# Patient Record
Sex: FEMALE | Race: BLACK OR AFRICAN AMERICAN | Employment: PART TIME | URBAN - METROPOLITAN AREA
[De-identification: names, ages, dates, MRNs, and addresses within clinical notes are randomized per-mention and may not be internally consistent; named-entity substitution may affect disease eponyms.]

---

## 2017-01-09 ENCOUNTER — OFFICE VISIT (OUTPATIENT)
Dept: FAMILY MEDICINE CLINIC | Age: 62
End: 2017-01-09

## 2017-01-09 VITALS
OXYGEN SATURATION: 95 % | WEIGHT: 234 LBS | DIASTOLIC BLOOD PRESSURE: 83 MMHG | HEIGHT: 67 IN | BODY MASS INDEX: 36.73 KG/M2 | SYSTOLIC BLOOD PRESSURE: 130 MMHG | HEART RATE: 93 BPM | RESPIRATION RATE: 20 BRPM | TEMPERATURE: 97.8 F

## 2017-01-09 DIAGNOSIS — J30.2 SEASONAL ALLERGIC RHINITIS, UNSPECIFIED ALLERGIC RHINITIS TRIGGER: Primary | ICD-10-CM

## 2017-01-09 DIAGNOSIS — H65.93 BILATERAL SEROUS OTITIS MEDIA, UNSPECIFIED CHRONICITY: ICD-10-CM

## 2017-01-09 RX ORDER — MOMETASONE FUROATE 50 UG/1
2 SPRAY, METERED NASAL DAILY
Qty: 2 CONTAINER | Refills: 0 | Status: SHIPPED | COMMUNITY
Start: 2017-01-09 | End: 2017-01-24 | Stop reason: SDUPTHER

## 2017-01-09 RX ORDER — MOMETASONE FUROATE 50 UG/1
2 SPRAY, METERED NASAL DAILY
Qty: 2 CONTAINER | Refills: 0 | Status: SHIPPED | COMMUNITY
Start: 2017-01-09 | End: 2021-03-22

## 2017-01-09 RX ORDER — CETIRIZINE HCL 10 MG
10 TABLET ORAL DAILY
Qty: 30 TAB | Refills: 0 | Status: SHIPPED | COMMUNITY
Start: 2017-01-09 | End: 2017-02-08

## 2017-01-09 NOTE — MR AVS SNAPSHOT
Visit Information Date & Time Provider Department Dept. Phone Encounter #  
 1/9/2017 11:00 AM Samy Bailey NP [address] [facility] [id number] Follow-up Instructions Return if symptoms worsen or fail to improve. Your Appointments 1/24/2017 10:30 AM  
Follow Up with Samy Bailey NP [address] [facility] ([facility]) Appt Note: 3 mth f/u  
 [address] [facility] [id number]  
[address] [facility] [id number] Upcoming Health Maintenance Date Due Pneumococcal 19-64 Medium Risk (1 of 1 - PPSV23) 12/14/1976 DTaP/Tdap/Td series (1 - Tdap) 12/14/1978 FOBT Q 1 YEAR AGE 50-75 12/11/2015 BREAST CANCER SCRN MAMMOGRAM 1/14/2017 PAP AKA CERVICAL CYTOLOGY 1/6/2018 Allergies as of 1/9/2017  Review Complete On: 1/9/2017 By: Jennifer Camp Severity Noted Reaction Type Reactions Compazine [Prochlorperazine]  06/07/2013    Hives Current Immunizations  Reviewed on 11/18/2015 Name Date Influenza Vaccine (Quad) PF 10/17/2016, 11/18/2015 Influenza Vaccine PF 10/31/2013 Not reviewed this visit You Were Diagnosed With   
  
 Codes Comments Seasonal allergic rhinitis, unspecified allergic rhinitis trigger    -  Primary ICD-10-CM: J30.2 ICD-9-CM: 477.9 Bilateral serous otitis media, unspecified chronicity     ICD-10-CM: H65.93 
ICD-9-CM: 381. 4 Vitals BP Pulse Temp Resp Height(growth percentile) Weight(growth percentile) 130/83 93 97.8 °F (36.6 °C) 20 5' 7\" (1.702 m) 234 lb (106.1 kg) SpO2 BMI OB Status Smoking Status 95% 36.65 kg/m2 Postmenopausal Current Every Day Smoker Vitals History BMI and BSA Data Body Mass Index Body Surface Area  
 36.65 kg/m 2 2.24 m 2 Preferred Pharmacy Pharmacy Name Phone Of[facility] Osorio [address] [facility] [phone] Your Updated Medication List  
  
   
This list is accurate as of: 1/9/17 11:32 AM.  Always use your most recent med list.  
  
  
  
  
 albuterol 90 mcg/actuation inhaler Commonly known as:  PROAIR HFA Take 1-2 Puffs by inhalation every four (4) hours as needed for Wheezing. cetirizine 10 mg tablet Commonly known as:  ZyrTEC Take 1 Tab by mouth daily for 30 days. CLEOCIN T 1 % topical gel Generic drug:  clindamycin Apply  to affected area two (2) times a day. use thin film on affected area  
  
 clobetasol 0.05 % ointment Commonly known as:  Meghan [place] Apply  to affected area two (2) times a day. dexlansoprazole 30 mg capsule Commonly known as:  DEXILANT Take 1 Cap by mouth daily as needed. Indications: GASTROESOPHAGEAL REFLUX  
  
 fluticasone-salmeterol 100-50 mcg/dose diskus inhaler Commonly known as:  ADVAIR Take 1 Puff by inhalation every twelve (12) hours. ledipasvir-sofosbuvir  mg Tab Commonly known as:  Jessica Nim Take 1 Tab by mouth daily. Indications: CHRONIC HEPATITIS C - GENOTYPE 1  
  
 magnesium oxide 400 mg tablet Commonly known as:  MAG-OX Take 1 Tab by mouth daily. * mometasone 50 mcg/actuation nasal spray Commonly known as:  NASONEX  
2 Sprays by Both Nostrils route daily. * mometasone 50 mcg/actuation nasal spray Commonly known as:  NASONEX  
2 Sprays by Both Nostrils route daily. olmesartan-hydroCHLOROthiazide 40-25 mg per tablet Commonly known as:  BENICAR HCT Take 1 Tab by mouth daily. Indications: HYPERTENSION  
  
 travoprost 0.004 % ophthalmic solution Commonly known as:  TRAVATAN Z Administer 1 Drop to both eyes every evening. Indications: OPEN ANGLE GLAUCOMA  
  
 verapamil 120 mg tablet Commonly known as:  CALAN Take 1 Tab by mouth two (2) times a day. Indications: Hypertension * Notice: This list has 2 medication(s) that are the same as other medications prescribed for you. Read the directions carefully, and ask your doctor or other care provider to review them with you. Prescriptions Printed Refills  
 mometasone (NASONEX) 50 mcg/actuation nasal spray 0 Si Sprays by Both Nostrils route daily. Class: Program  
 Route: Both Nostrils Prescriptions Sent to Mail Order Refills  
 mometasone (NASONEX) 50 mcg/actuation nasal spray 0 Si Sprays by Both Nostrils route daily. Class: Program  
 Pharmacy: [address] #: [phone] Route: Both Nostrils Prescriptions Sent to Pharmacy Refills  
 mometasone (NASONEX) 50 mcg/actuation nasal spray 0 Si Sprays by Both Nostrils route daily. Class: Program  
 Pharmacy: [address] #: [phone] Route: Both Nostrils Follow-up Instructions Return if symptoms worsen or fail to improve. Patient Instructions Rhinitis: Care Instructions Your Care Instructions Rhinitis is swelling and irritation in the nose. Allergies and infections are often the cause. Your nose may run or feel stuffy. Other symptoms are itchy and sore eyes, ears, throat, and mouth. If allergies are the cause, your doctor may do tests to find out what you are allergic to. You may be able to stop symptoms if you avoid the things that cause them. Your doctor may suggest or prescribe medicine to ease your symptoms. Follow-up care is a key part of your treatment and safety. Be sure to make and go to all appointments, and call your doctor if you are having problems. It's also a good idea to know your test results and keep a list of the medicines you take. How can you care for yourself at home?  
· If your rhinitis is caused by allergies, try to find out what sets off (triggers) your symptoms. Take steps to avoid these triggers. ¨ Avoid yard work. It can stir up both pollen and mold. ¨ Do not smoke or allow others to smoke around you. If you need help quitting, talk to your doctor about stop-smoking programs and medicines. These can increase your chances of quitting for good. ¨ Do not use aerosol sprays, cleaning products, or perfumes. ¨ If pollen is one of your triggers, close your house and car windows during blooming season. ¨ Clean your house often to control dust. 
¨ Keep pets outside. · If your doctor recommends over-the-counter medicines to relieve symptoms, take your medicines exactly as prescribed. Call your doctor if you think you are having a problem with your medicine. · Use saline (saltwater) nasal washes to help keep your nasal passages open and wash out mucus and bacteria. You can buy saline nose drops at a grocery store or drugstore. Or you can make your own at home by adding 1 teaspoon of salt and 1 teaspoon of baking soda to 2 cups of distilled water. If you make your own, fill a bulb syringe with the solution, insert the tip into your nostril, and squeeze gently. [place] L[place] your nose. When should you call for help? Call your doctor now or seek immediate medical care if: 
· You are having trouble breathing. Watch closely for changes in your health, and be sure to contact your doctor if: · Mucus from your nose gets thicker (like pus) or has new blood in it. · You have new or worse symptoms. · You do not get better as expected. Where can you learn more? Go to http://drew-tarun.info/. Enter M030 in the search box to learn more about \"Rhinitis: Care Instructions. \" Current as of: July 29, 2016 Content Version: 11.1 © 2455-8369 [employer]. Care instructions adapted under license by [employer] (which disclaims liability or warranty for this information).  If you have questions about a medical condition or this instruction, always ask your healthcare professional. [place] [address] any warranty or liability for your use of this information. Using a Nasal Steroid Spray: Care Instructions Your Care Instructions Your doctor may suggest using a corticosteroid nasal spray for your allergy symptoms or sinus problems. These sprays reduce the swelling inside the nose and sinuses. Unlike decongestant nasal sprays, steroid sprays won't lead to more swelling when you stop taking them. These sprays start working in a few days, but it may take several weeks before you get the full effect. Most side effects are minor. The most common complaint is a burning feeling in the nose right after the spray is used. Some people get nosebleeds. Follow-up care is a key part of your treatment and safety. Be sure to make and go to all appointments, and call your doctor if you are having problems. It's also a good idea to know your test results and keep a list of the medicines you take. How can you care for yourself at home? Here are some tips for using these sprays: 
· You may need to prime the sprayer before you use it. This means spraying it into the air a few times to make sure you get the right amount of medicine. Follow the directions on the label. · Blow your nose before you spray. This will help clear out your nostrils. · Gently sniff the medicine into your nose as you spray. Don't snort, or the medicine will go all the way into your throat where it won't do much good. · Aim the nozzle straight toward the back of your nose. This will help keep the medicine from irritating the inside walls of your nostril, especially your septum (the wall that separates your left and right nostrils). · Don't blow your nose for 10 minutes or so after you spray. And try not to sneeze. · Be safe with medicines. Use this medicine exactly as prescribed.  Call your doctor if you think you are having a problem with your medicine. · Clean your sprayer once a week. Read the label to learn how. When should you call for help? Call your doctor now or seek immediate medical care if: 
· You don't understand how to use the medicine. · Your symptoms aren't getting better as expected. · You think you are having a side effect from the medicine. Where can you learn more? Go to http://drew-tarun.info/. Enter W447 in the search box to learn more about \"Using a Nasal Steroid Spray: Care Instructions. \" Current as of: July 29, 2016 Content Version: 11.1 © 2843-7730 [employer]. Care instructions adapted under license by [employer] (which disclaims liability or warranty for this information). If you have questions about a medical condition or this instruction, always ask your healthcare professional. [place] [address] any warranty or liability for your use of this information. Introducing [facility]! Dear Toni Li: Thank you for requesting a [employer] account. Our records indicate that you already have an active [employer] account. You can access your account anytime at [email or web address] Did you know that you can access your hospital and ER discharge instructions at any time in [employer]? You can also review all of your test results from your hospital stay or ER visit. Additional Information If you have questions, please visit the Frequently Asked Questions section of the [employer] website at [email or web address] Remember, [employer] is NOT to be used for urgent needs. For medical emergencies, dial 911. Now available from your iPhone and Android! Please provide this summary of care documentation to your next provider. Your primary care clinician is listed as Jevon Colon.  If you have any questions after today's visit, please call [phone].

## 2017-01-09 NOTE — PROGRESS NOTES
SULEIMAN CHOU [facility]  [facility], [address] [place] [address]  [phone] office/[phone] fax      1/9/2017    Reason for visit:   Chief Complaint   Patient presents with    Ear Pain     b/l ear pain off and on about 2 months the rt ear started first and now both of them hurt       Patient: Keith Fontenot, 1955, [id number]       Primary MD: Dheeraj Marsh NP    Subjective:   Keith Fontenot, a 63 y.o. female, who presents for Ear Pain (b/l ear pain off and on about 2 months the rt ear started first and now both of them hurt)      Ear Pain   The history is provided by the patient. This is a new problem. The current episode started more than 1 week ago. The problem occurs constantly. The problem has been gradually worsening (Now involving both ears). Associated symptoms include headaches. Pertinent negatives include no chest pain, no abdominal pain and no shortness of breath. Treatments tried: Claritin, zyrtec. The treatment provided no relief.        Past Medical History   Diagnosis Date    Arthritis of knee, left 2007    Erythrasma May 2014     beba feet, lower left leg    Glaucoma     H/O mammogram 03/01/2016     normal, f/u in 1 year    Hepatitis C 2002     genotype 1a, previous IV drug user, declined interferons     Hypertension 2002    Tobacco use      working with [facility] Pulmonology smoking cessation        Past Surgical History   Procedure Laterality Date    Hx other surgical  2002     liver biopsy       Social History     Social History    Marital status:      Spouse name: N/A    Number of children: 2    Years of education: N/A     Occupational History   [employer]      Social History Main Topics    Smoking status: Current Every Day Smoker     Packs/day: 0.50     Types: Cigarettes     Start date: 1/12/1974    Smokeless tobacco: Not on file    Alcohol use No    Drug use: Yes      Comment: IV crack cocaine, in remission since 2002    Sexual activity: Yes     Partners: Male     Other Topics Concern     Service No    Blood Transfusions No    Caffeine Concern No    Occupational Exposure No    Hobby Hazards No    Sleep Concern No    Stress Concern No    Weight Concern Yes     want bypass    Special Diet No    Back Care No    Exercise No    Bike Helmet No    Seat Belt No    Self-Exams No     Social History Narrative       Allergies   Allergen Reactions    Compazine [Prochlorperazine] Hives       Current Outpatient Prescriptions on File Prior to Visit   Medication Sig Dispense Refill    verapamil (CALAN) 120 mg tablet Take 1 Tab by mouth two (2) times a day. Indications: Hypertension 60 Tab 5    olmesartan-hydrochlorothiazide (BENICAR HCT) 40-25 mg per tablet Take 1 Tab by mouth daily. Indications: HYPERTENSION 90 Tab 3    fluticasone-salmeterol (ADVAIR) 100-50 mcg/dose diskus inhaler Take 1 Puff by inhalation every twelve (12) hours. 3 Inhaler 3    albuterol (PROAIR HFA) 90 mcg/actuation inhaler Take 1-2 Puffs by inhalation every four (4) hours as needed for Wheezing. 3 Inhaler 3    dexlansoprazole (DEXILANT) 30 mg capsule Take 1 Cap by mouth daily as needed. Indications: GASTROESOPHAGEAL REFLUX 90 Cap 3    magnesium oxide (MAG-OX) 400 mg tablet Take 1 Tab by mouth daily. 30 Tab 11    travoprost (TRAVATAN Z) 0.004 % ophthalmic solution Administer 1 Drop to both eyes every evening. Indications: OPEN ANGLE GLAUCOMA 5 mL 3    clindamycin (CLEOCIN T) 1 % topical gel Apply  to affected area two (2) times a day. use thin film on affected area      clobetasol (TEMOVATE) 0.05 % ointment Apply  to affected area two (2) times a day.  ledipasvir-sofosbuvir (HARVONI)  mg tab Take 1 Tab by mouth daily. Indications: CHRONIC HEPATITIS C - GENOTYPE 1 90 Tab 0     No current facility-administered medications on file prior to visit. Review of Systems   Constitutional: Negative for fever.    HENT: Positive for congestion, ear pain and nosebleeds (From blowing nose so much ). Negative for ear discharge, hearing loss and tinnitus. Blowing nose a lot yellowish/clear   Respiratory: Negative for shortness of breath. Cardiovascular: Negative for chest pain. Gastrointestinal: Negative for abdominal pain. Neurological: Positive for headaches. Endo/Heme/Allergies: Positive for environmental allergies. Objective:   Visit Vitals    /83    Pulse 93    Temp 97.8 °F (36.6 °C)    Resp 20    Ht 5' 7\" (1.702 m)    Wt 234 lb (106.1 kg)    SpO2 95%    BMI 36.65 kg/m2      Wt Readings from Last 3 Encounters:   01/09/17 234 lb (106.1 kg)   10/19/16 234 lb 9.6 oz (106.4 kg)   11/18/15 235 lb 3.2 oz (106.7 kg)     Lab Results   Component Value Date/Time    Glucose 123 10/13/2016 10:10 AM         Physical Exam   Constitutional: She appears well-developed and well-nourished. HENT:   Head: Normocephalic. Right Ear: A middle ear effusion is present. Left Ear: A middle ear effusion is present. Nose: Mucosal edema and rhinorrhea present. Right sinus exhibits no maxillary sinus tenderness and no frontal sinus tenderness. Left sinus exhibits no maxillary sinus tenderness and no frontal sinus tenderness. Highly vascularized   Skin: She is not diaphoretic. Assessment:    Manuela Zhang who has risk factors including (see above previous medical hx) and:       ICD-10-CM ICD-9-CM    1. Seasonal allergic rhinitis, unspecified allergic rhinitis trigger J30.2 477.9 mometasone (NASONEX) 50 mcg/actuation nasal spray      mometasone (NASONEX) 50 mcg/actuation nasal spray      cetirizine (ZYRTEC) 10 mg tablet   2. Bilateral serous otitis media, unspecified chronicity H65.93 381.4 mometasone (NASONEX) 50 mcg/actuation nasal spray      mometasone (NASONEX) 50 mcg/actuation nasal spray      cetirizine (ZYRTEC) 10 mg tablet     1.  Seasonal allergic rhinitis, unspecified allergic rhinitis trigger  -Saline irrigation   - mometasone (NASONEX) 50 mcg/actuation nasal spray; 2 Sprays by Both Nostrils route daily. Dispense: 2 Container; Refill: 0  - mometasone (NASONEX) 50 mcg/actuation nasal spray; 2 Sprays by Both Nostrils route daily. Dispense: 2 Container; Refill: 0  - cetirizine (ZYRTEC) 10 mg tablet; Take 1 Tab by mouth daily for 30 days. Dispense: 30 Tab; Refill: 0    2. Bilateral serous otitis media, unspecified chronicity  - diff dx clogged eustacian tubes secondary to URI vs allergies  - mometasone (NASONEX) 50 mcg/actuation nasal spray; 2 Sprays by Both Nostrils route daily. Dispense: 2 Container; Refill: 0  - mometasone (NASONEX) 50 mcg/actuation nasal spray; 2 Sprays by Both Nostrils route daily. Dispense: 2 Container; Refill: 0  - cetirizine (ZYRTEC) 10 mg tablet; Take 1 Tab by mouth daily for 30 days. Dispense: 30 Tab; Refill: 0      Written instructions followed our verbal discussion of all information discussed above, pending tests ordered and future goals/plans. Patient expressed understanding of current diagnosis, planned testing, follow up and if needed to contact the office for any questions or concerns prior to the next visit. Plan:   Med reconciliation completed with patient. Reviewed side effects of medications with the patient. Questions were answered and patient verb understanding. Orders Placed This Encounter    mometasone (NASONEX) 50 mcg/actuation nasal spray     Si Sprays by Both Nostrils route daily. Dispense:  2 Container     Refill:  0    mometasone (NASONEX) 50 mcg/actuation nasal spray     Si Sprays by Both Nostrils route daily.      Dispense:  2 Container     Refill:  0     Order Specific Question:   Expiration Date     Answer:   2017     Order Specific Question:   Lot#     Answer:   [id number]     Order Specific Question:        Answer:   [facility]     Order Specific Question:   NDC#     Answer:   [address]-67    cetirizine (ZYRTEC) 10 mg tablet     Sig: Take 1 Tab by mouth daily for 30 days. Dispense:  30 Tab     Refill:  0     Order Specific Question:   Site     Answer:   OTHER     Order Specific Question:   Expiration Date     Answer:   1/9/2017     Order Specific Question:   Lot#     Answer:   UNAVAILABLE     Order Specific Question:        Answer:   UNAVAILABLE     Current Outpatient Prescriptions   Medication Sig Dispense Refill    mometasone (NASONEX) 50 mcg/actuation nasal spray 2 Sprays by Both Nostrils route daily. 2 Container 0    mometasone (NASONEX) 50 mcg/actuation nasal spray 2 Sprays by Both Nostrils route daily. 2 Container 0    cetirizine (ZYRTEC) 10 mg tablet Take 1 Tab by mouth daily for 30 days. 30 Tab 0    verapamil (CALAN) 120 mg tablet Take 1 Tab by mouth two (2) times a day. Indications: Hypertension 60 Tab 5    olmesartan-hydrochlorothiazide (BENICAR HCT) 40-25 mg per tablet Take 1 Tab by mouth daily. Indications: HYPERTENSION 90 Tab 3    fluticasone-salmeterol (ADVAIR) 100-50 mcg/dose diskus inhaler Take 1 Puff by inhalation every twelve (12) hours. 3 Inhaler 3    albuterol (PROAIR HFA) 90 mcg/actuation inhaler Take 1-2 Puffs by inhalation every four (4) hours as needed for Wheezing. 3 Inhaler 3    dexlansoprazole (DEXILANT) 30 mg capsule Take 1 Cap by mouth daily as needed. Indications: GASTROESOPHAGEAL REFLUX 90 Cap 3    magnesium oxide (MAG-OX) 400 mg tablet Take 1 Tab by mouth daily. 30 Tab 11    travoprost (TRAVATAN Z) 0.004 % ophthalmic solution Administer 1 Drop to both eyes every evening. Indications: OPEN ANGLE GLAUCOMA 5 mL 3    clindamycin (CLEOCIN T) 1 % topical gel Apply  to affected area two (2) times a day. use thin film on affected area      clobetasol (TEMOVATE) 0.05 % ointment Apply  to affected area two (2) times a day.  ledipasvir-sofosbuvir (HARVONI)  mg tab Take 1 Tab by mouth daily.  Indications: CHRONIC HEPATITIS C - GENOTYPE 1 90 Tab 0 Medications Discontinued During This Encounter   Medication Reason    cyproheptadine (PERIACTIN) 4 mg tablet Not A Current Medication       Follow-up Disposition:  Return if symptoms worsen or fail to improve. Labs needed for follow-up appt    \"No Show policy was reviewed with the patient. The services affected are the nurse navigator and the provider. No show appointments include missing labs for a future scheduled appointment, Pap/pelvics, arriving to appointment more than 10 minutes late, and calling to cancel appointment less than 24 hours in advance. After the 6th No Show, the patient will be removed from the [facility] to include medications for 6 months. The patient will be referred to the [place] [address] for their primary care needs. \"     Power Manrique, [address]      I spent 20 minutes with the patient in face-to-face consultation, of which greater than 50% was spent in counseling and coordination of care as described above.

## 2017-01-09 NOTE — PATIENT INSTRUCTIONS
Rhinitis: Care Instructions  Your Care Instructions  Rhinitis is swelling and irritation in the nose. Allergies and infections are often the cause. Your nose may run or feel stuffy. Other symptoms are itchy and sore eyes, ears, throat, and mouth. If allergies are the cause, your doctor may do tests to find out what you are allergic to. You may be able to stop symptoms if you avoid the things that cause them. Your doctor may suggest or prescribe medicine to ease your symptoms. Follow-up care is a key part of your treatment and safety. Be sure to make and go to all appointments, and call your doctor if you are having problems. It's also a good idea to know your test results and keep a list of the medicines you take. How can you care for yourself at home? · If your rhinitis is caused by allergies, try to find out what sets off (triggers) your symptoms. Take steps to avoid these triggers. ¨ Avoid yard work. It can stir up both pollen and mold. ¨ Do not smoke or allow others to smoke around you. If you need help quitting, talk to your doctor about stop-smoking programs and medicines. These can increase your chances of quitting for good. ¨ Do not use aerosol sprays, cleaning products, or perfumes. ¨ If pollen is one of your triggers, close your house and car windows during blooming season. ¨ Clean your house often to control dust.  ¨ Keep pets outside. · If your doctor recommends over-the-counter medicines to relieve symptoms, take your medicines exactly as prescribed. Call your doctor if you think you are having a problem with your medicine. · Use saline (saltwater) nasal washes to help keep your nasal passages open and wash out mucus and bacteria. You can buy saline nose drops at a grocery store or drugstore. Or you can make your own at home by adding 1 teaspoon of salt and 1 teaspoon of baking soda to 2 cups of distilled water.  If you make your own, fill a bulb syringe with the solution, insert the tip into your nostril, and squeeze gently. Frank Saucer your nose. When should you call for help? Call your doctor now or seek immediate medical care if:  · You are having trouble breathing. Watch closely for changes in your health, and be sure to contact your doctor if:  · Mucus from your nose gets thicker (like pus) or has new blood in it. · You have new or worse symptoms. · You do not get better as expected. Where can you learn more? Go to http://drew-tarun.info/. Enter M030 in the search box to learn more about \"Rhinitis: Care Instructions. \"  Current as of: July 29, 2016  Content Version: 11.1  © 1926-2259 [employer]. Care instructions adapted under license by [employer] (which disclaims liability or warranty for this information). If you have questions about a medical condition or this instruction, always ask your healthcare professional. [place] [address] any warranty or liability for your use of this information. Using a Nasal Steroid Spray: Care Instructions  Your Care Instructions    Your doctor may suggest using a corticosteroid nasal spray for your allergy symptoms or sinus problems. These sprays reduce the swelling inside the nose and sinuses. Unlike decongestant nasal sprays, steroid sprays won't lead to more swelling when you stop taking them. These sprays start working in a few days, but it may take several weeks before you get the full effect. Most side effects are minor. The most common complaint is a burning feeling in the nose right after the spray is used. Some people get nosebleeds. Follow-up care is a key part of your treatment and safety. Be sure to make and go to all appointments, and call your doctor if you are having problems. It's also a good idea to know your test results and keep a list of the medicines you take. How can you care for yourself at home?   Here are some tips for using these sprays:  · You may need to prime the sprayer before you use it. This means spraying it into the air a few times to make sure you get the right amount of medicine. Follow the directions on the label. · Blow your nose before you spray. This will help clear out your nostrils. · Gently sniff the medicine into your nose as you spray. Don't snort, or the medicine will go all the way into your throat where it won't do much good. · Aim the nozzle straight toward the back of your nose. This will help keep the medicine from irritating the inside walls of your nostril, especially your septum (the wall that separates your left and right nostrils). · Don't blow your nose for 10 minutes or so after you spray. And try not to sneeze. · Be safe with medicines. Use this medicine exactly as prescribed. Call your doctor if you think you are having a problem with your medicine. · Clean your sprayer once a week. Read the label to learn how. When should you call for help? Call your doctor now or seek immediate medical care if:  · You don't understand how to use the medicine. · Your symptoms aren't getting better as expected. · You think you are having a side effect from the medicine. Where can you learn more? Go to http://drew-tarun.info/. Enter Y614 in the search box to learn more about \"Using a Nasal Steroid Spray: Care Instructions. \"  Current as of: July 29, 2016  Content Version: 11.1  © 9980-2154 [employer]. Care instructions adapted under license by [employer] (which disclaims liability or warranty for this information). If you have questions about a medical condition or this instruction, always ask your healthcare professional. [place] [address] any warranty or liability for your use of this information.

## 2017-01-12 DIAGNOSIS — E83.42 HYPOMAGNESEMIA: ICD-10-CM

## 2017-01-12 DIAGNOSIS — I10 ESSENTIAL HYPERTENSION: Primary | ICD-10-CM

## 2017-01-16 ENCOUNTER — HOSPITAL ENCOUNTER (OUTPATIENT)
Dept: LAB | Age: 62
Discharge: HOME | End: 2017-01-16

## 2017-01-16 ENCOUNTER — LAB ONLY (OUTPATIENT)
Dept: FAMILY MEDICINE CLINIC | Age: 62
End: 2017-01-16

## 2017-01-16 DIAGNOSIS — E83.42 HYPOMAGNESEMIA: ICD-10-CM

## 2017-01-16 DIAGNOSIS — I10 ESSENTIAL HYPERTENSION: ICD-10-CM

## 2017-01-16 DIAGNOSIS — I10 ESSENTIAL HYPERTENSION: Primary | ICD-10-CM

## 2017-01-16 LAB
ALBUMIN SERPL BCP-MCNC: 3.7 G/DL (ref 3.4–5)
ALBUMIN/GLOB SERPL: 1 {RATIO} (ref 0.8–1.7)
ALP SERPL-CCNC: 86 U/L (ref 45–117)
ALT SERPL-CCNC: 169 U/L (ref 13–56)
ANION GAP BLD CALC-SCNC: 10 MMOL/L (ref 3–18)
AST SERPL W P-5'-P-CCNC: 177 U/L (ref 15–37)
BILIRUB SERPL-MCNC: 1 MG/DL (ref 0.2–1)
BUN SERPL-MCNC: 18 MG/DL (ref 7–18)
BUN/CREAT SERPL: 21 (ref 12–20)
CALCIUM SERPL-MCNC: 8.9 MG/DL (ref 8.5–10.1)
CHLORIDE SERPL-SCNC: 111 MMOL/L (ref 100–108)
CHOLEST SERPL-MCNC: 110 MG/DL
CO2 SERPL-SCNC: 22 MMOL/L (ref 21–32)
CREAT SERPL-MCNC: 0.86 MG/DL (ref 0.6–1.3)
GLOBULIN SER CALC-MCNC: 3.8 G/DL (ref 2–4)
GLUCOSE SERPL-MCNC: 95 MG/DL (ref 74–99)
HDLC SERPL-MCNC: 35 MG/DL (ref 40–60)
HDLC SERPL: 3.1 {RATIO} (ref 0–5)
LDLC SERPL CALC-MCNC: 57.4 MG/DL (ref 0–100)
LIPID PROFILE,FLP: ABNORMAL
MAGNESIUM SERPL-MCNC: 2 MG/DL (ref 1.8–2.4)
POTASSIUM SERPL-SCNC: 3.8 MMOL/L (ref 3.5–5.5)
PROT SERPL-MCNC: 7.5 G/DL (ref 6.4–8.2)
SODIUM SERPL-SCNC: 143 MMOL/L (ref 136–145)
TRIGL SERPL-MCNC: 88 MG/DL (ref ?–150)
VLDLC SERPL CALC-MCNC: 17.6 MG/DL

## 2017-01-16 PROCEDURE — 80061 LIPID PANEL: CPT | Performed by: NURSE PRACTITIONER

## 2017-01-16 PROCEDURE — 80053 COMPREHEN METABOLIC PANEL: CPT | Performed by: NURSE PRACTITIONER

## 2017-01-16 PROCEDURE — 83735 ASSAY OF MAGNESIUM: CPT | Performed by: NURSE PRACTITIONER

## 2017-01-16 NOTE — PROGRESS NOTES
Pt. Name,  and orders verified before specimen collection. Site prepped using aseptic procedure. 23 gauge butterfly was utilized to collect specimen. Labs drawn in RT hand x's 1 attempts. Site benign no bleeding noted. Band-Aid and 2x2 applied. Pt tolerated procedure well.

## 2017-01-17 NOTE — PROGRESS NOTES
Will review results with pt at 1/24/17 appt  1. LFTs elevated. Hep C positive Refused interferons in the past. Would offer treatment again. The patient needs to complete hep A and Hep B series  2.  Lipid panel, Mag, and BMP oK

## 2017-01-19 ENCOUNTER — TELEPHONE (OUTPATIENT)
Dept: FAMILY MEDICINE CLINIC | Age: 62
End: 2017-01-19

## 2017-01-19 NOTE — TELEPHONE ENCOUNTER
Medication: Benicar HCT 40-25 mg, dose: 1 tab, how often: daily, current number of medication days provided: 90, refill per application. Lot # [id number], EXP.10/18. This medication was received and verified for the following 1. Correct Patient, 2. Correct Diagnosis, 3. Correct Drug, 4. Correct route, and no current allergy to medication. Please contact patient to come  their medications.      Hilaria Garcia MSN, RN, [facility]

## 2017-01-24 ENCOUNTER — OFFICE VISIT (OUTPATIENT)
Dept: FAMILY MEDICINE CLINIC | Age: 62
End: 2017-01-24

## 2017-01-24 VITALS
DIASTOLIC BLOOD PRESSURE: 81 MMHG | WEIGHT: 232.8 LBS | SYSTOLIC BLOOD PRESSURE: 126 MMHG | TEMPERATURE: 97.9 F | HEART RATE: 100 BPM | OXYGEN SATURATION: 96 % | BODY MASS INDEX: 36.54 KG/M2 | HEIGHT: 67 IN | RESPIRATION RATE: 16 BRPM

## 2017-01-24 DIAGNOSIS — K21.9 GASTROESOPHAGEAL REFLUX DISEASE WITHOUT ESOPHAGITIS: ICD-10-CM

## 2017-01-24 DIAGNOSIS — E66.9 OBESITY (BMI 30-39.9): ICD-10-CM

## 2017-01-24 DIAGNOSIS — Z23 NEED FOR HEPATITIS A IMMUNIZATION: ICD-10-CM

## 2017-01-24 DIAGNOSIS — Z12.39 BREAST CANCER SCREENING: ICD-10-CM

## 2017-01-24 DIAGNOSIS — I10 ESSENTIAL HYPERTENSION: ICD-10-CM

## 2017-01-24 DIAGNOSIS — I10 ESSENTIAL HYPERTENSION WITH GOAL BLOOD PRESSURE LESS THAN 140/90: ICD-10-CM

## 2017-01-24 DIAGNOSIS — B18.2 CHRONIC HEPATITIS C WITHOUT HEPATIC COMA (HCC): Primary | ICD-10-CM

## 2017-01-24 DIAGNOSIS — Z71.6 TOBACCO ABUSE COUNSELING: ICD-10-CM

## 2017-01-24 DIAGNOSIS — H40.9 GLAUCOMA OF BOTH EYES, UNSPECIFIED GLAUCOMA: ICD-10-CM

## 2017-01-24 DIAGNOSIS — J44.9 COPD, MILD (HCC): ICD-10-CM

## 2017-01-24 RX ORDER — FLUTICASONE PROPIONATE AND SALMETEROL 100; 50 UG/1; UG/1
1 POWDER RESPIRATORY (INHALATION) EVERY 12 HOURS
Qty: 3 INHALER | Refills: 3 | Status: SHIPPED | COMMUNITY
Start: 2017-01-24 | End: 2019-04-16 | Stop reason: SDUPTHER

## 2017-01-24 RX ORDER — ALBUTEROL SULFATE 90 UG/1
1-2 AEROSOL, METERED RESPIRATORY (INHALATION)
Qty: 3 INHALER | Refills: 3 | Status: SHIPPED | COMMUNITY
Start: 2017-01-24 | End: 2019-04-16 | Stop reason: SDUPTHER

## 2017-01-24 RX ORDER — OLMESARTAN MEDOXOMIL AND HYDROCHLOROTHIAZIDE 40/25 40; 25 MG/1; MG/1
1 TABLET ORAL DAILY
Qty: 90 TAB | Refills: 3 | Status: SHIPPED | COMMUNITY
Start: 2017-01-24 | End: 2018-04-24 | Stop reason: SDUPTHER

## 2017-01-24 RX ORDER — DEXLANSOPRAZOLE 30 MG/1
30 CAPSULE, DELAYED RELEASE ORAL
Qty: 90 CAP | Refills: 3 | Status: SHIPPED | COMMUNITY
Start: 2017-01-24 | End: 2021-03-22

## 2017-01-24 NOTE — PROGRESS NOTES
[facility]  [address] [facility], [address] [place] [address] Avenue  [phone] office/[phone] fax      1/24/2017    Reason for visit:   Chief Complaint   Patient presents with    Results    Medication Management       Patient: Manohar Buckley, 1955, [id number]       Primary MD: Jevon Colon NP    Subjective:   Manohar Buckley, a 63 y.o. female, who presents for Results and Medication Management      HPI   Cardiovascular Disease Follow up: The patient has hypertension. Diet and Lifestyle: smoker current  Home BP Monitoring: is not measured at home. Pertinent ROS: taking medications as instructed, no medication side effects noted, no TIA's, no chest pain on exertion, no dyspnea on exertion, no swelling of ankles.          Past Medical History   Diagnosis Date    Arthritis of knee, left 2007    Erythrasma May 2014     beba feet, lower left leg    Glaucoma     H/O mammogram 03/01/2016     normal, f/u in 1 year    Hepatitis C 2002     genotype 1a, previous IV drug user, declined interferons     Hypertension 2002    Tobacco use      working with [facility] Pulmonology smoking cessation        Past Surgical History   Procedure Laterality Date    Hx other surgical  2002     liver biopsy       Social History     Social History    Marital status:      Spouse name: N/A    Number of children: 2    Years of education: N/A     Occupational History   [employer]      Social History Main Topics    Smoking status: Current Every Day Smoker     Packs/day: 0.50     Types: Cigarettes     Start date: 1/12/1974    Smokeless tobacco: Not on file    Alcohol use No    Drug use: Yes      Comment: IV crack cocaine, in remission since 2002    Sexual activity: Yes     Partners: Male     Other Topics Concern     Service No    Blood Transfusions No    Caffeine Concern No    Occupational Exposure No    Hobby Hazards No    Sleep Concern No    Stress Concern No    Weight Concern Yes     want bypass    Special Diet No    Back Care No    Exercise No    Bike Helmet No    Seat Belt No    Self-Exams No     Social History Narrative       Allergies   Allergen Reactions    Compazine [Prochlorperazine] Hives       Current Outpatient Prescriptions on File Prior to Visit   Medication Sig Dispense Refill    mometasone (NASONEX) 50 mcg/actuation nasal spray 2 Sprays by Both Nostrils route daily. 2 Container 0    cetirizine (ZYRTEC) 10 mg tablet Take 1 Tab by mouth daily for 30 days. 30 Tab 0    verapamil (CALAN) 120 mg tablet Take 1 Tab by mouth two (2) times a day. Indications: Hypertension 60 Tab 5    clobetasol (TEMOVATE) 0.05 % ointment Apply  to affected area two (2) times a day.  magnesium oxide (MAG-OX) 400 mg tablet Take 1 Tab by mouth daily. 30 Tab 11    travoprost (TRAVATAN Z) 0.004 % ophthalmic solution Administer 1 Drop to both eyes every evening. Indications: OPEN ANGLE GLAUCOMA 5 mL 3    clindamycin (CLEOCIN T) 1 % topical gel Apply  to affected area two (2) times a day. use thin film on affected area      ledipasvir-sofosbuvir (HARVONI)  mg tab Take 1 Tab by mouth daily. Indications: CHRONIC HEPATITIS C - GENOTYPE 1 90 Tab 0     No current facility-administered medications on file prior to visit. Review of Systems   Constitutional: Negative. HENT: Negative. Eyes: Negative. Respiratory: Negative. Cardiovascular: Negative. Gastrointestinal: Negative. Genitourinary: Negative. Musculoskeletal: Negative. Skin: Negative. Neurological: Negative. Endo/Heme/Allergies: Negative. Psychiatric/Behavioral: Negative for depression, hallucinations, substance abuse and suicidal ideas. The patient is not nervous/anxious and does not have insomnia. Stress related to taking care of elderly mom and son.         Objective:   Visit Vitals    /81 (BP 1 Location: Left arm, BP Patient Position: Sitting)    Pulse 100    Temp 97.9 °F (36.6 °C) (Oral)    Resp 16    Ht 5' 7\" (1.702 m)    Wt 232 lb 12.8 oz (105.6 kg)    SpO2 96%    BMI 36.46 kg/m2      Wt Readings from Last 3 Encounters:   01/24/17 232 lb 12.8 oz (105.6 kg)   01/09/17 234 lb (106.1 kg)   10/19/16 234 lb 9.6 oz (106.4 kg)     Lab Results   Component Value Date/Time    Glucose 95 01/16/2017 09:37 AM         Physical Exam   Constitutional: She is oriented to person, place, and time. She appears well-developed and well-nourished. HENT:   Head: Normocephalic. Eyes: Pupils are equal, round, and reactive to light. Scleral icterus is present. Neck: Normal range of motion. Neck supple. No JVD present. Carotid bruit is not present. Cardiovascular: Normal rate, regular rhythm, normal heart sounds and intact distal pulses. No murmur heard. Pulmonary/Chest: Effort normal and breath sounds normal. No respiratory distress. Abdominal: Soft. Bowel sounds are normal.   Musculoskeletal: Normal range of motion. Neurological: She is alert and oriented to person, place, and time. She has normal reflexes. Skin: Skin is warm and dry. Psychiatric: She has a normal mood and affect. Her behavior is normal.   Vitals reviewed. Assessment:    Catalina Santana who has risk factors including (see above previous medical hx) and:       ICD-10-CM ICD-9-CM    1. Chronic hepatitis C without hepatic coma (HCC) B18.2 070.54    2. Essential hypertension I10 401.9    3. Gastroesophageal reflux disease without esophagitis K21.9 530.81 dexlansoprazole (DEXILANT) 30 mg capsule   4. Need for hepatitis A immunization Z23 V05.3    5. Need for hepatitis B vaccination Z23 V05.3    6. Obesity (BMI 30-39. 9) E66.9 278.00    7. COPD, mild (HCC) J44.9 496 fluticasone-salmeterol (ADVAIR) 100-50 mcg/dose diskus inhaler      albuterol (PROAIR HFA) 90 mcg/actuation inhaler   8.  Tobacco abuse counseling Z71.6 V65.42 fluticasone-salmeterol (ADVAIR) 100-50 mcg/dose diskus inhaler     305.1 albuterol (PROAIR HFA) 90 mcg/actuation inhaler   9. Essential hypertension with goal blood pressure less than 140/90 I10 401.9 olmesartan-hydroCHLOROthiazide (BENICAR HCT) 40-25 mg per tablet     1. Essential hypertension    2. Gastroesophageal reflux disease without esophagitis    - dexlansoprazole (DEXILANT) 30 mg capsule; Take 1 Cap by mouth daily as needed. Indications: GASTROESOPHAGEAL REFLUX  Dispense: 90 Cap; Refill: 3    3. Chronic hepatitis C without hepatic coma (HCC)  -The patient is in the process of getting Hep A vaccinations. She reports that she has completed hep b series and will bring records.  -Then we will proceed with Ultrasound   -Hepatitis C is a liver infection caused by the Hepatitis C virus (HCV). Hepatitis C is a blood-borne virus. Today, most people become infected with the Hepatitis C virus by sharing needles. For some people, hepatitis C is a short-term illness but for 70%85% of people who become infected with Hepatitis C, it becomes a long-term, chronic infection. Chronic Hepatitis C is a serious disease than can result in long-term health problems, even death. The majority of infected persons might not be aware of their infection because they are not clinically ill. There is no vaccine for Hepatitis C. Discussed with pt that in order to receive Harvoni therapy they must: obtain all Hep A and B vaccines from the health dept ($75), MUST stop all alcohol and heroin. Pt was notified that random tests will be done for illicit drugs and alcohol. If at anytime (after the initial UDS) the pt is positive, therapy will not be initiated despite pt obtaining needed vaccines and if pt is already on therapy, therapy will be stopped and not resumed as these cause damage to the liver. 4. Need for hepatitis A immunization  -Currently in process    5.  Obesity (BMI 30-39.9)  -Weight management: Discussed importance of maintaining a normal weight through healthy dietary changes and aerobic exercise on a daily basis of 30 min or more to decrease need for additional medications, reduction of blood glucose/blood pressure/ dementia/osteoporosis/stress and depression. Aerobic exercise was described as an activity that makes them sweaty and elevates their heart rate such as walking, running, bike, treadmill, stair climbing, joining a gym or swimming. Discussed the patient's above normal BMI with her. Recommended the following interventions: dietary management education, guidance, counseling, exercise and monitoring weight. BMI is out of normal parameters and plan is as follows: Discussed in great detail on diet, portion control, exercise, avoiding foods high in sugar, carbs and starches, mealtimes and to eat until satisfied not til full. I have counseled this patient on diet and exercise regimens      6. COPD, mild (HCC)  -COPD, or chronic obstructive pulmonary disease, is a progressive disease that makes it hard to breathe. \"Progressive\" means the disease gets worse over time. COPD can cause coughing that produces large amounts of mucus (a slimy substance), wheezing, shortness of breath, chest tightness, and other symptoms. Cigarette smoking is the leading cause of COPD. Most people who have COPD smoke or used to smoke. Long-term exposure to other lung irritantssuch as air pollution, chemical fumes, or dustalso may contribute to COPD. - fluticasone-salmeterol (ADVAIR) 100-50 mcg/dose diskus inhaler; Take 1 Puff by inhalation every twelve (12) hours. Dispense: 3 Inhaler; Refill: 3  - albuterol (PROAIR HFA) 90 mcg/actuation inhaler; Take 1-2 Puffs by inhalation every four (4) hours as needed for Wheezing. Dispense: 3 Inhaler; Refill: 3    7. Tobacco abuse counseling  -Counseled patient on the dangers of tobacco use, and was advised to quit. Reviewed strategies to maximize success, including the use of Chantix.   Discussed the risks of continued tobacco use such as elevated blood pressure, vascular irritation with increased incidence of CVD with stroke or MI and PVD causing claudication, lung damage that could lead to COPD, cancer and death. Encouraged an approach to find a few healthy habits, write them down then plan to decrease their cigarette use by one each week till they are gone all together and to plan for stress that may cause them to want to restart and how to prevent it by having new coping mechanisms in place. 1-800-QUIT-NOW provided for counseling     - fluticasone-salmeterol (ADVAIR) 100-50 mcg/dose diskus inhaler; Take 1 Puff by inhalation every twelve (12) hours. Dispense: 3 Inhaler; Refill: 3  - albuterol (PROAIR HFA) 90 mcg/actuation inhaler; Take 1-2 Puffs by inhalation every four (4) hours as needed for Wheezing. Dispense: 3 Inhaler; Refill: 3    8. Essential hypertension with goal blood pressure less than 140/90  -The current medical regimen is effective;  continue present plan and medications.  -Reports she has enough refills of Verapamil   - olmesartan-hydroCHLOROthiazide (BENICAR HCT) 40-25 mg per tablet; Take 1 Tab by mouth daily. Indications: Hypertension  Dispense: 90 Tab; Refill: 3    9. Breast cancer screening  -Per patient mammo due in March [facility]     10. Glaucoma of both eyes, unspecified glaucoma  -[facility] op[facility]amology      Written instructions followed our verbal discussion of all information discussed above, pending tests ordered and future goals/plans. Patient expressed understanding of current diagnosis, planned testing, follow up and if needed to contact the office for any questions or concerns prior to the next visit. Plan:   Med reconciliation completed with patient. Reviewed side effects of medications with the patient. Questions were answered and patient verb understanding. Discussed lab results with patient  1. LFTs elevated. Hep C positive Refused interferons in the past. Would offer treatment again.  The patient needs to complete hep A and Hep B series  2. Lipid panel, Mag, and BMP oK    Orders Placed This Encounter    fluticasone-salmeterol (ADVAIR) 100-50 mcg/dose diskus inhaler     Sig: Take 1 Puff by inhalation every twelve (12) hours. Dispense:  3 Inhaler     Refill:  3    albuterol (PROAIR HFA) 90 mcg/actuation inhaler     Sig: Take 1-2 Puffs by inhalation every four (4) hours as needed for Wheezing. Dispense:  3 Inhaler     Refill:  3    olmesartan-hydroCHLOROthiazide (BENICAR HCT) 40-25 mg per tablet     Sig: Take 1 Tab by mouth daily. Indications: Hypertension     Dispense:  90 Tab     Refill:  3    dexlansoprazole (DEXILANT) 30 mg capsule     Sig: Take 1 Cap by mouth daily as needed. Indications: GASTROESOPHAGEAL REFLUX     Dispense:  90 Cap     Refill:  3     Current Outpatient Prescriptions   Medication Sig Dispense Refill    fluticasone-salmeterol (ADVAIR) 100-50 mcg/dose diskus inhaler Take 1 Puff by inhalation every twelve (12) hours. 3 Inhaler 3    albuterol (PROAIR HFA) 90 mcg/actuation inhaler Take 1-2 Puffs by inhalation every four (4) hours as needed for Wheezing. 3 Inhaler 3    olmesartan-hydroCHLOROthiazide (BENICAR HCT) 40-25 mg per tablet Take 1 Tab by mouth daily. Indications: Hypertension 90 Tab 3    dexlansoprazole (DEXILANT) 30 mg capsule Take 1 Cap by mouth daily as needed. Indications: GASTROESOPHAGEAL REFLUX 90 Cap 3    mometasone (NASONEX) 50 mcg/actuation nasal spray 2 Sprays by Both Nostrils route daily. 2 Container 0    cetirizine (ZYRTEC) 10 mg tablet Take 1 Tab by mouth daily for 30 days. 30 Tab 0    verapamil (CALAN) 120 mg tablet Take 1 Tab by mouth two (2) times a day. Indications: Hypertension 60 Tab 5    clobetasol (TEMOVATE) 0.05 % ointment Apply  to affected area two (2) times a day.  magnesium oxide (MAG-OX) 400 mg tablet Take 1 Tab by mouth daily.  30 Tab 11    travoprost (TRAVATAN Z) 0.004 % ophthalmic solution Administer 1 Drop to both eyes every evening. Indications: OPEN ANGLE GLAUCOMA 5 mL 3    clindamycin (CLEOCIN T) 1 % topical gel Apply  to affected area two (2) times a day. use thin film on affected area      ledipasvir-sofosbuvir (HARVONI)  mg tab Take 1 Tab by mouth daily. Indications: CHRONIC HEPATITIS C - GENOTYPE 1 90 Tab 0     Medications Discontinued During This Encounter   Medication Reason    mometasone (NASONEX) 50 mcg/actuation nasal spray Duplicate Order    fluticasone-salmeterol (ADVAIR) 100-50 mcg/dose diskus inhaler Reorder    albuterol (PROAIR HFA) 90 mcg/actuation inhaler Reorder    olmesartan-hydrochlorothiazide (BENICAR HCT) 40-25 mg per tablet Reorder    dexlansoprazole (DEXILANT) 30 mg capsule Reorder       Follow-up Disposition:  Return in about 6 months (around 7/24/2017), or if symptoms worsen or fail to improve. Labs needed for follow-up appt    \"No Show policy was reviewed with the patient. The services affected are the nurse navigator and the provider. No show appointments include missing labs for a future scheduled appointment, Pap/pelvics, arriving to appointment more than 10 minutes late, and calling to cancel appointment less than 24 hours in advance. After the 6th No Show, the patient will be removed from the [facility] to include medications for 6 months. The patient will be referred to the [place] [id number] for their primary care needs. \"     Dipti Jensen, [address] I spent 30 minutes with the patient in face-to-face consultation, of which greater than 50% was spent in counseling and coordination of care as described above.

## 2017-01-24 NOTE — MR AVS SNAPSHOT
Visit Information Date & Time Provider Department Dept. Phone Encounter #  
 1/24/2017 10:30 AM Carol Gallegos NP [address] [id number] Follow-up Instructions Return in about 6 months (around 7/24/2017), or if symptoms worsen or fail to improve. Upcoming Health Maintenance Date Due DTaP/Tdap/Td series (1 - Tdap) 12/14/1978 FOBT Q 1 YEAR AGE 50-75 12/11/2015 BREAST CANCER SCRN MAMMOGRAM 1/14/2017 Pneumococcal 19-64 Medium Risk (1 of 1 - PPSV23) 1/24/2018* PAP AKA CERVICAL CYTOLOGY 1/6/2018 *Topic was postponed. The date shown is not the original due date. Allergies as of 1/24/2017  Review Complete On: 1/24/2017 By: Carol Gallegos NP Severity Noted Reaction Type Reactions Compazine [Prochlorperazine]  06/07/2013    Hives Current Immunizations  Reviewed on 11/18/2015 Name Date Influenza Vaccine (Quad) PF 10/17/2016, 11/18/2015 Influenza Vaccine PF 10/31/2013 Not reviewed this visit You Were Diagnosed With   
  
 Codes Comments Chronic hepatitis C without hepatic coma (HCC)    -  Primary ICD-10-CM: B18.2 ICD-9-CM: 070.54 Essential hypertension     ICD-10-CM: I10 
ICD-9-CM: 401.9 Gastroesophageal reflux disease without esophagitis     ICD-10-CM: K21.9 ICD-9-CM: 530.81 Need for hepatitis A immunization     ICD-10-CM: S10 ICD-9-CM: V05.3 Obesity (BMI 30-39. 9)     ICD-10-CM: E66.9 ICD-9-CM: 278.00   
 COPD, mild ([address]     ICD-10-CM: J44.9 ICD-9-CM: 638 Tobacco abuse counseling     ICD-10-CM: Z71.6 ICD-9-CM: V65.42, 305.1 Essential hypertension with goal blood pressure less than 140/90     ICD-10-CM: I10 
ICD-9-CM: 401.9 Breast cancer screening     ICD-10-CM: Z12.39 
ICD-9-CM: V76.10 Glaucoma of both eyes, unspecified glaucoma     ICD-10-CM: H40.9 ICD-9-CM: 365.9 Vitals BP Pulse Temp Resp Height(growth percentile) Weight(growth percentile) 126/81 (BP 1 Location: Left arm, BP Patient Position: Sitting) 100 97.9 °F (36.6 °C) (Oral) 16 5' 7\" (1.702 m) 232 lb 12.8 oz (105.6 kg) SpO2 BMI OB Status Smoking Status 96% 36.46 kg/m2 Postmenopausal Current Every Day Smoker Vitals History BMI and BSA Data Body Mass Index Body Surface Area  
 36.46 kg/m 2 2.23 m 2 Preferred Pharmacy Pharmacy Name Phone Dorian Kelly ,[facility] [address] [facility] [address] [place] [address] [phone] Your Updated Medication List  
  
   
This list is accurate as of: 1/24/17 10:53 AM.  Always use your most recent med list.  
  
  
  
  
 albuterol 90 mcg/actuation inhaler Commonly known as:  PROAIR HFA Take 1-2 Puffs by inhalation every four (4) hours as needed for Wheezing. cetirizine 10 mg tablet Commonly known as:  ZyrTEC Take 1 Tab by mouth daily for 30 days. CLEOCIN T 1 % topical gel Generic drug:  clindamycin Apply  to affected area two (2) times a day. use thin film on affected area  
  
 clobetasol 0.05 % ointment Commonly known as:  Beverley Sciara Apply  to affected area two (2) times a day. dexlansoprazole 30 mg capsule Commonly known as:  DEXILANT Take 1 Cap by mouth daily as needed. Indications: GASTROESOPHAGEAL REFLUX  
  
 fluticasone-salmeterol 100-50 mcg/dose diskus inhaler Commonly known as:  ADVAIR Take 1 Puff by inhalation every twelve (12) hours. ledipasvir-sofosbuvir  mg Tab Commonly known as:  Ardella Cordia Take 1 Tab by mouth daily. Indications: CHRONIC HEPATITIS C - GENOTYPE 1  
  
 magnesium oxide 400 mg tablet Commonly known as:  MAG-OX Take 1 Tab by mouth daily. mometasone 50 mcg/actuation nasal spray Commonly known as:  NASONEX  
2 Sprays by Both Nostrils route daily. olmesartan-hydroCHLOROthiazide 40-25 mg per tablet Commonly known as:  BENICAR HCT  
 Take 1 Tab by mouth daily. Indications: Hypertension  
  
 travoprost 0.004 % ophthalmic solution Commonly known as:  TRAVATAN Z Administer 1 Drop to both eyes every evening. Indications: OPEN ANGLE GLAUCOMA  
  
 verapamil 120 mg tablet Commonly known as:  CALAN Take 1 Tab by mouth two (2) times a day. Indications: Hypertension Prescriptions Printed Refills  
 fluticasone-salmeterol (ADVAIR) 100-50 mcg/dose diskus inhaler 3 Sig: Take 1 Puff by inhalation every twelve (12) hours. Class: Program  
 Route: Inhalation  
 albuterol (PROAIR HFA) 90 mcg/actuation inhaler 3 Sig: Take 1-2 Puffs by inhalation every four (4) hours as needed for Wheezing. Class: Program  
 Route: Inhalation  
 olmesartan-hydroCHLOROthiazide (BENICAR HCT) 40-25 mg per tablet 3 Sig: Take 1 Tab by mouth daily. Indications: Hypertension Class: Program  
 Route: Oral  
 dexlansoprazole (DEXILANT) 30 mg capsule 3 Sig: Take 1 Cap by mouth daily as needed. Indications: GASTROESOPHAGEAL REFLUX Class: Program  
 Route: Oral  
  
Prescriptions Sent to Mail Order Refills  
 fluticasone-salmeterol (ADVAIR) 100-50 mcg/dose diskus inhaler 3 Sig: Take 1 Puff by inhalation every twelve (12) hours. Class: Program  
 Pharmacy: [address] Ph #: [phone] Route: Inhalation  
 albuterol (PROAIR HFA) 90 mcg/actuation inhaler 3 Sig: Take 1-2 Puffs by inhalation every four (4) hours as needed for Wheezing. Class: Program  
 Pharmacy: [address] Ph #: [phone] Route: Inhalation  
 olmesartan-hydroCHLOROthiazide (BENICAR HCT) 40-25 mg per tablet 3 Sig: Take 1 Tab by mouth daily. Indications: Hypertension Class: Program  
 Pharmacy: [address] Ph #: [phone]  Route: Oral  
 dexlansoprazole (DEXILANT) 30 mg capsule 3  
 Sig: Take 1 Cap by mouth daily as needed. Indications: GASTROESOPHAGEAL REFLUX Class: Program  
 Pharmacy: [facility] [address] #: [phone] Route: Oral  
  
Prescriptions Sent to Pharmacy Refills  
 fluticasone-salmeterol (ADVAIR) 100-50 mcg/dose diskus inhaler 3 Sig: Take 1 Puff by inhalation every twelve (12) hours. Class: Program  
 Pharmacy: [facility] [address] #: [phone] Route: Inhalation  
 albuterol (PROAIR HFA) 90 mcg/actuation inhaler 3 Sig: Take 1-2 Puffs by inhalation every four (4) hours as needed for Wheezing. Class: Program  
 Pharmacy: [facility] [address] #: [phone] Route: Inhalation  
 olmesartan-hydroCHLOROthiazide (BENICAR HCT) 40-25 mg per tablet 3 Sig: Take 1 Tab by mouth daily. Indications: Hypertension Class: Program  
 Pharmacy: [facility] [address] #: [phone] Route: Oral  
 dexlansoprazole (DEXILANT) 30 mg capsule 3 Sig: Take 1 Cap by mouth daily as needed. Indications: GASTROESOPHAGEAL REFLUX Class: Program  
 Pharmacy: [facility] [address] [place] [address] #: [phone] Route: Oral  
  
Follow-up Instructions Return in about 6 months (around 7/24/2017), or if symptoms worsen or fail to improve. Patient Instructions Learning About Benefits From Quitting Smoking How does quitting smoking make you healthier? If you're thinking about quitting smoking, you may have a few reasons to be smoke-free. Your health may be one of them. · When you quit smoking, you lower your risks for cancer, lung disease, heart attack, stroke, blood vessel disease, and blindness from macular degeneration. · When you're smoke-free, you get sick less often, and you heal faster. You are less likely to get colds, flu, bronchitis, and pneumonia. · As a nonsmoker, you may find that your mood is better and you are less stressed. When and how will you feel healthier? Quitting has real health benefits that start from day 1 of being smoke-free. And the longer you stay smoke-free, the healthier you get and the better you feel. The first hours · After just 20 minutes, your blood pressure and heart rate go down. That means there's less stress on your heart and blood vessels. · Within 12 hours, the level of carbon monoxide in your blood drops back to normal. That makes room for more oxygen. With more oxygen in your body, you may notice that you have more energy than when you smoked. After 2 weeks · Your lungs start to work better. · Your risk of heart attack starts to drop. After 1 month · When your lungs are clear, you cough less and breathe deeper, so it's easier to be active. · Your sense of taste and smell return. That means you can enjoy food more than you have since you started smoking. Over the years · After 1 year, your risk of heart disease is half what it would be if you kept smoking. · After 5 years, your risk of stroke starts to shrink. Within a few years after that, it's about the same as if you'd never smoked. · After 10 years, your risk of dying from lung cancer is cut by about half. And your risk for many other types of cancer is lower too. How would quitting help others in your life? When you quit smoking, you improve the health of everyone who now breathes in your smoke. · Their heart, lung, and cancer risks drop, much like yours. · They are sick less. For babies and small children, living smoke-free means they're less likely to have ear infections, pneumonia, and bronchitis. · If you're a woman who is or will be pregnant someday, quitting smoking means a healthier . · Children who are close to you are less likely to become adult smokers. Where can you learn more? Go to http://drew-tarun.info/. Enter 052 [id number] [id number] 11 in the search box to learn more about \"Learning About Benefits From Quitting Smoking. \" Current as of: May 26, 2016 Content Version: 11.1 © 4275-7470 [employer]. Care instructions adapted under license by [employer] (which disclaims liability or warranty for this information). If you have questions about a medical condition or this instruction, always ask your healthcare professional. [place] [address] any warranty or liability for your use of this information. The [facility] reminders! Foundation Operating Hours: These may change without notice. Mon- Wed 7am to 5pm. Closed for lunch 12-1pm 
Thurs 7am to 12pm 
Fridays closed NO SHOW POLICY ~ If a patient has 3 no shows for an appointment with the Provider, Mental Health Provider, or the Nurse Navigator in 6 months, they will be discharged from the practice for 6 months. Medication ordering will also be suspended. If the patient is discharged from the [facility], they can go to the [place] [address] where they can be seen for the primary needs plus obtain the same types of medications as they receive at the [facility]. To avoid being discharged, the patient must call the office at [phone] 24 hours prior to their appointment if they need to cancel, arrive to their appointments on time and come to all scheduled appointments. If the patient is discharged from the practice, they can apply to be re-established after 12 months. Lab work:  Unless you are instructed differently, please return to the office between the hours of 7 am and 10:30 am Monday through Thursday to have your labs drawn one week before your next scheduled PROVIDER visit.   If you do not have an appointment to follow up on these results, please make one or plan to call the office if you do not hear from us to get the results. No news does not mean good news. Medications: If your medications are new or have changed, and you get your medications from the clinic pharmacy ([facility]), you MUST talk to the pharmacy staff to sign the new prescription applications. If you don't sign the applications we cannot get the medications for you. It usually takes 6-8 weeks for your medications to arrive. The Pharmacy staff will call you when your medications are available. You will have 30 days to come in and  your medications. If you don't  your medicines within those 30 days, those medicines will be placed on the self as samples and you will have to start all over again by completing the applications and waiting the 6-8 weeks for your medicines to arrive. This is firm and there will be no exceptions! ! The [facility] or [facility] will assist you with your medications if available but not all medications are available so some may be obtained through local pharmacies such as [facility] that has a large $4 list and [facility] that has many drugs for free or also for $4.  We will work hard to get the best medications for you that you can also afford. Feet Care: Local [facility] through [facility] Every second Tuesday of the month (except for holidays and election days) from 9am to 1 pm. The services provided by these ministry volunteers are free of charge with the option to donate. They will inspect your feet thoroughly, soak them for 10 minutes, cut and file your nails. They care for diabetics as well. Keep in mind this service is free and will be on a first come first serve basis. Bad teeth? Ask about the Dental Bus to get you in front of a local dentist. The bus leaves every other Wednesday for those on the list. (Ask about availability as these appointments are limited) Eye exams for Diabetics.   Please let us know so we can add you to the list to see the eye doctor at [place]. You will receive a free eye exam and free glasses if needed. Unfortunately, if you are not a diabetic, we do not have a free service for eye exams for you (yet!). We do have information on where to go to get a huge discount on eye exams and glasses. Sick visits: If you are sick and it is not an emergency call the office to see if you can schedule an appointment. Charges and cost items from the clinic:  Most of our orders are covered by [employer] but there ARE SOME CHARGES for items such as radiology interpretations and anesthesiology during procedures and surgeries. Please make sure you have contacted the Advanced Patient Advocacy (APA) group to check on your payment options: [email or web address]. or come in and talk to them in person: On 
Tuesdays, we have Advanced Patient Advocacy at the Clinic from [address] - 11:30pm and 1pm - 3pm. If you can't make it to the clinic on Tuesdays during their operating hours then APA is available Mon - Fri 8-4pm at [facility] on the first floor by the information desk. Their number is [phone]. It is important that you are screened in order to qualify for assistance and to avoid huge medical charges. The [facility] is not responsible for ANY charges you may accrue regardless of who ordered the medication, procedure, treatment or test. If you go to the Emergency Room, you WILL be charged! Behavior and emotional issues! It is stressful to be sick, have an illness, take medications, not have a job, not have medical insurance, have family issues or just getting older! Schedule an appointment with our mental health provider. She is in the office Mondays and Wednesdays from 8am to [facility] [address] can also contact the following: The national suicide hotline (9-357-301-TALK or 9-778.819.2389) [facility] [address] [facility], [address] [place] [address] 
[phone] Community Services Board (CSB) - [place] [address] [facility], Chava Amaro  
[phone] Immunizations/Vaccination Routine Immunizations are provided for infants, children, teens, and adults at the [facility]. Please bring all immunization records with you and present them at the time of registration. Phone [phone] Hours (Walk in) Monday, Tuesday, Wednesday and Friday 8:00 AM to 11:00 AM 
12:30 PM to 3:00 PM 
 
 
Drug and Alcohol Addiction Issues! It is hard to stop a poor habit but there is help out there. Please feel free to attend any other the following support groups to help you kick the habit or go to [facility] Emergency Department to be evaluated by the psychiatric team. Never give up!! Letty meetings: Jie walter Hayder Herb hayes [facility] MONDAY 7:30 PM JUST FOR TODAY [employer] [facility] [address] [facility] WEDNESDAY 10:30 AM [employer]Kim[employer]ly [place] [employer] [facility], ROOM [address] [place] [address] WEDNESDAY 8:00 PM [facility] [address] [facility] THURSDAY 8:00 PM KEEP IT SIMPLE [employer] Al-Kimberly [facility] [address] 8:00 PM LET IT BEGIN WITH ME [employer]on [facility] [address] [facility] FRIDAY 6;30 PM [place] [employer] [facility] [address] [facility]  [place] MONDAY 10:30  [place] [address] [place] SATURDAY 8:00 PM [facility] SATURDAY NIGHT [employer] [place] [address] [place] MONDAY 7:00 PM MONDAY NIGHT [employer] Al-Anon [facility] [address]  
 
[place] WEDNESDAY 8:00 PM [employer] Al[employer]Anon [facility] [address] 
 
  
Hepatitis C: Care Instructions Your Care Instructions Hepatitis C is an infection of the liver caused by a virus. This virus spreads when blood or body fluids from an infected person enter another person's body. This occurs most often when people share needles that have the virus on them. In the past, people got the virus through blood transfusions and organ transplants. But since 1992, all donated blood and organs have been screened for hepatitis C. So getting the virus this way is now very rare. Less often, hepatitis C can spread through sex and sharing items such as razor blades or toothbrushes. Needles used for tattoos and body piercings can also spread the virus. The virus doesn't always cause symptoms. But you may feel tired. And you may have a headache, sore muscles, nausea, and pain in the upper right belly. Other symptoms include yellowish skin and dark urine. Home treatment can help ease symptoms. And your doctor may prescribe antiviral medicine. Long-term infection can lead to severe liver damage. So make sure to go to your follow-up appointments. Follow-up care is a key part of your treatment and safety. Be sure to make and go to all appointments, and call your doctor if you are having problems. It's also a good idea to know your test results and keep a list of the medicines you take. How can you care for yourself at home? · Be safe with medicines. If your doctor prescribes antiviral medicine, take it exactly as prescribed. Call your doctor if you think you are having a problem with your medicine. · Do not drink alcohol. Alcohol can damage the liver. Tell your doctor if you need help to quit. Counseling, support groups, and sometimes medicines can help you stay sober. · Do not take drugs or herbal medicines. They can make liver problems worse. · Make sure your doctor knows all of the medicines you take. Some medicines, such as acetaminophen (Tylenol), can make liver problems worse. Do not take any new medicines unless your doctor tells you to.  This includes over-the-counter medicines. · Maintain a healthy lifestyle. Get plenty of exercise if you feel up to it. Eat a healthy diet. · Drink plenty of fluids, enough so that your urine is light yellow or clear like water. If you have kidney, heart, or liver disease and have to limit fluids, talk with your doctor before you increase the amount of fluids you drink. · Get the vaccines (if you have not already) to protect yourself from hepatitis A and hepatitis B, influenza, and pneumococcus. · The infection can make you itch. Keep cool and stay out of the sun. Try to wear cotton clothing. Talk to your doctor about using over-the-counter medicines for itching. These include diphenhydramine (Benadryl) and chlorpheniramine (Chlor-Trimeton). Follow the instructions on the label. · If you feel depressed, talk to your doctor about treatment. Many people who have long-term illnesses get depressed. Keep in mind that antiviral medicine can make depression worse. To avoid spreading hepatitis C to others · Tell the people that you live with or have sex with about your illness as soon as you can. · Don't share needles to inject drugs. Don't share other equipment (such as cotton, spoons, and water) with others. Find out if a needle exchange program is available in your area, and use it. Get into a drug treatment program. 
· Practice safer sex. Reduce your number of sex partners if you have more than one. Unless you are in a long-term relationship in which neither partner has sex with anyone else, always use latex condoms when you have sex. · Don't donate blood or blood products, organs, semen, or eggs (ova). · Make sure that all equipment is sterilized if you get a tattoo, have your body pierced, or have acupuncture. · Do not share your personal items. These include razors, toothbrushes, towels, and nail files. · Tell your doctor, dentist, and anyone else who may come in contact with your blood about your illness. · Prevent others from coming in contact with your blood and other body fluids. Keep any cuts, scrapes, or blisters covered. · Wash your handsand any object that has come in contact with your bloodthoroughly with water and soap. When should you call for help? Call 911 anytime you think you may need emergency care. For example, call if: 
· You passed out (lost consciousness). · You have severe trouble breathing. · You feel very confused and can't think clearly. · You vomit blood or what looks like coffee grounds. · You pass maroon or very bloody stools. Call your doctor now or seek immediate medical care if: 
· You have any trouble breathing. · You have new bruises or blood spots under your skin. · Your stools are black and tarlike or have streaks of blood. · You have signs of needing more fluids. You have sunken eyes and a dry mouth, and you pass only a little dark urine. Watch closely for changes in your health, and be sure to contact your doctor if: 
· You have a nosebleed. · Your gums bleed when you brush your teeth. Where can you learn more? Go to http://drew-tarun.info/. Enter O403 in the search box to learn more about \"Hepatitis C: Care Instructions. \" Current as of: May 24, 2016 Content Version: 11.1 © 6914-6547 Healthwise, Incorporated. Care instructions adapted under license by [employer] (which disclaims liability or warranty for this information). If you have questions about a medical condition or this instruction, always ask your healthcare professional. [place] [address] any warranty or liability for your use of this information. Introducing [facility] & HEALTH SERVICES! Dear Roberto Stoddard: Thank you for requesting a [place] account. Our records indicate that you already have an active [employer] account. You can access your account anytime at [email or web address] Did you know that you can access your hospital and ER discharge instructions at any time in Circa? You can also review all of your test results from your hospital stay or ER visit. Additional Information If you have questions, please visit the Frequently Asked Questions section of the Circa website at [email or web address] Remember, Circa is NOT to be used for urgent needs. For medical emergencies, dial 911. Now available from your iPhone and Android! Please provide this summary of care documentation to your next provider. Your primary care clinician is listed as Dheeraj Marsh. If you have any questions after today's visit, please call [phone].

## 2017-01-24 NOTE — PATIENT INSTRUCTIONS
Learning About Benefits From Quitting Smoking  How does quitting smoking make you healthier? If you're thinking about quitting smoking, you may have a few reasons to be smoke-free. Your health may be one of them. · When you quit smoking, you lower your risks for cancer, lung disease, heart attack, stroke, blood vessel disease, and blindness from macular degeneration. · When you're smoke-free, you get sick less often, and you heal faster. You are less likely to get colds, flu, bronchitis, and pneumonia. · As a nonsmoker, you may find that your mood is better and you are less stressed. When and how will you feel healthier? Quitting has real health benefits that start from day 1 of being smoke-free. And the longer you stay smoke-free, the healthier you get and the better you feel. The first hours  · After just 20 minutes, your blood pressure and heart rate go down. That means there's less stress on your heart and blood vessels. · Within 12 hours, the level of carbon monoxide in your blood drops back to normal. That makes room for more oxygen. With more oxygen in your body, you may notice that you have more energy than when you smoked. After 2 weeks  · Your lungs start to work better. · Your risk of heart attack starts to drop. After 1 month  · When your lungs are clear, you cough less and breathe deeper, so it's easier to be active. · Your sense of taste and smell return. That means you can enjoy food more than you have since you started smoking. Over the years  · After 1 year, your risk of heart disease is half what it would be if you kept smoking. · After 5 years, your risk of stroke starts to shrink. Within a few years after that, it's about the same as if you'd never smoked. · After 10 years, your risk of dying from lung cancer is cut by about half. And your risk for many other types of cancer is lower too. How would quitting help others in your life?   When you quit smoking, you improve the health of everyone who now breathes in your smoke. · Their heart, lung, and cancer risks drop, much like yours. · They are sick less. For babies and small children, living smoke-free means they're less likely to have ear infections, pneumonia, and bronchitis. · If you're a woman who is or will be pregnant someday, quitting smoking means a healthier . · Children who are close to you are less likely to become adult smokers. Where can you learn more? Go to http://drew-tarun.info/. Enter 052 [id number] 72 11 in the search box to learn more about \"Learning About Benefits From Quitting Smoking. \"  Current as of: May 26, 2016  Content Version: 11.1  © 7895-3781 [employer], [employer]. Care instructions adapted under license by [employer] (which disclaims liability or warranty for this information). If you have questions about a medical condition or this instruction, always ask your healthcare professional. [place] [address] any warranty or liability for your use of this information. The [facility] [place] reminders! Foundation Operating Hours: These may change without notice. Mon- Wed 7am to 5pm. Closed for lunch 12-1pm  Th 7am to 12pm   closed     NO SHOW POLICY ~ If a patient has 3 no shows for an appointment with the Provider, Mental Health Provider, or the Nurse Navigator in 6 months, they will be discharged from the practice for 6 months. Medication ordering will also be suspended. If the patient is discharged from the [facility], they can go to the [place] [address] where they can be seen for the primary needs plus obtain the same types of medications as they receive at the [facility]. To avoid being discharged, the patient must call the office at [phone] 24 hours prior to their appointment if they need to cancel, arrive to their appointments on time and come to all scheduled appointments.  If the patient is discharged from the practice, they can apply to be re-established after 12 months. Lab work:  Unless you are instructed differently, please return to the office between the hours of 7 am and 10:30 am Monday through Thursday to have your labs drawn one week before your next scheduled PROVIDER visit. If you do not have an appointment to follow up on these results, please make one or plan to call the office if you do not hear from us to get the results. No news does not mean good news. Medications: If your medications are new or have changed, and you get your medications from the clinic pharmacy ([facility]), you MUST talk to the pharmacy staff to sign the new prescription applications. If you don't sign the applications we cannot get the medications for you. It usually takes 6-8 weeks for your medications to arrive. The Pharmacy staff will call you when your medications are available. You will have 30 days to come in and  your medications. If you don't  your medicines within those 30 days, those medicines will be placed on the self as samples and you will have to start all over again by completing the applications and waiting the 6-8 weeks for your medicines to arrive. This is firm and there will be no exceptions! ! The [facility] or [facility] will assist you with your medications if available but not all medications are available so some may be obtained through local pharmacies such as [facility] that has a large $4 list and [facility] that has many drugs for free or also for $4.  We will work hard to get the best medications for you that you can also afford. Feet Care: Local [facility] through [facility]  Every second Tuesday of the month (except for holidays and election days) from 9am to 1 pm. The services provided by these ministry volunteers are free of charge with the option to donate. They will inspect your feet thoroughly, soak them for 10 minutes, cut and file your nails. They care for diabetics as well.  Keep in mind this service is free and will be on a first come first serve basis. Bad teeth? Ask about the Dental Bus to get you in front of a local dentist. The bus leaves every other Wednesday for those on the list. (Ask about availability as these appointments are limited)    Eye exams for Diabetics. Please let us know so we can add you to the list to see the eye doctor at [address] [facility]. You will receive a free eye exam and free glasses if needed. Unfortunately, if you are not a diabetic, we do not have a free service for eye exams for you (yet!). We do have information on where to go to get a huge discount on eye exams and glasses. Sick visits: If you are sick and it is not an emergency call the office to see if you can schedule an appointment. Charges and cost items from the clinic:  Most of our orders are covered by [employer] [place] [address] [place] but there ARE SOME CHARGES for items such as radiology interpretations and anesthesiology during procedures and surgeries. Please make sure you have contacted the Advanced Patient Advocacy (APA) group to check on your payment options: [email or web address]. or come in and talk to them in person: On  Tuesdays, we have Advanced Patient Advocacy at the Clinic from [facility] - 11:30pm and 1pm - 3pm. If you can't make it to the clinic on Tuesdays during their operating hours then APA is available Mon - Fri 8-4pm at DR. [facility] on the first floor by the information desk. Their number is [phone]. It is important that you are screened in order to qualify for assistance and to avoid huge medical charges. The [facility] is not responsible for ANY charges you may accrue regardless of who ordered the medication, procedure, treatment or test. If you go to the Emergency Room, you WILL be charged! Behavior and emotional issues! It is stressful to be sick, have an illness, take medications, not have a job, not have medical insurance, have family issues or just getting older! Schedule an appointment with our mental health provider. She is in the office Mondays and Wednesdays from 8am to [address] [facility] can also contact the following: The national suicide hotline (8-208-740-IWJC or 9-596.245.3195)    Anneliese [address] [facility], [id number] [place] [address]  [phone]    [facility] - [address] [facility], [facility]   [phone]            Immunizations/Vaccination  Routine Immunizations are provided for infants, children, teens, and adults at the [facility]. Please bring all immunization records with you and present them at the time of registration. Phone [phone] extension [phone]  Hours (Walk in)  Monday, Tuesday, Wednesday and Friday  8:00 AM to 11:00 AM  12:30 PM to 3:00 PM      Drug and Alcohol Addiction Issues! It is hard to stop a poor habit but there is help out there. Please feel free to attend any other the following support groups to help you kick the habit or go to [facility] Department to be evaluated by the psychiatric team. Never give up!! AlAnon meetings: [facility] MONDAY 7:30 PM JUST FOR TODAY [employer] Carlie [facility] [address] [facility]     [facility] WEDNESDAY 10:30 AM [employer] Al-Kimberly Cuero [employer], ROOM [address] [facility]     [facility] WEDNESDAY 8:00  Felton Courtney [address] [facility] 8:00 PM KEEP IT SIMPLE [employer] Al-Kim[facility]ly [facility] [address] THURSDAY 8:00 PM LET IT BEGIN WITH ME [employer] Carlie [facility] Rabia [address] 6;30 PM [place] [employer] [place] [address] [facility] [address]  [facility] [address]     [place] MONDAY 10:30 AM [employer] Carlie ESPINO [facility] 96 [facility] SATURDAY 8:00 PM [facility] SATURDAY NIGHT STALIN Sexton [facility] 96 [facility] MONDAY 7:00 PM MONDAY NIGHT AF[employer] Carlie CALIXTO [facility] [address]     [facility] WEDNESDAY 8:00 PM [employer] Carlie [facility] [address]        Hepatitis C: Care Instructions  Your Care Instructions  Hepatitis C is an infection of the liver caused by a virus. This virus spreads when blood or body fluids from an infected person enter another person's body. This occurs most often when people share needles that have the virus on them. In the past, people got the virus through blood transfusions and organ transplants. But since 1992, all donated blood and organs have been screened for hepatitis C. So getting the virus this way is now very rare. Less often, hepatitis C can spread through sex and sharing items such as razor blades or toothbrushes. Needles used for tattoos and body piercings can also spread the virus. The virus doesn't always cause symptoms. But you may feel tired. And you may have a headache, sore muscles, nausea, and pain in the upper right belly. Other symptoms include yellowish skin and dark urine. Home treatment can help ease symptoms. And your doctor may prescribe antiviral medicine. Long-term infection can lead to severe liver damage. So make sure to go to your follow-up appointments. Follow-up care is a key part of your treatment and safety. Be sure to make and go to all appointments, and call your doctor if you are having problems. It's also a good idea to know your test results and keep a list of the medicines you take. How can you care for yourself at home? · Be safe with medicines. If your doctor prescribes antiviral medicine, take it exactly as prescribed. Call your doctor if you think you are having a problem with your medicine. · Do not drink alcohol. Alcohol can damage the liver. Tell your doctor if you need help to quit.  Counseling, support groups, and sometimes medicines can help you stay sober. · Do not take drugs or herbal medicines. They can make liver problems worse. · Make sure your doctor knows all of the medicines you take. Some medicines, such as acetaminophen (Tylenol), can make liver problems worse. Do not take any new medicines unless your doctor tells you to. This includes over-the-counter medicines. · Maintain a healthy lifestyle. Get plenty of exercise if you feel up to it. Eat a healthy diet. · Drink plenty of fluids, enough so that your urine is light yellow or clear like water. If you have kidney, heart, or liver disease and have to limit fluids, talk with your doctor before you increase the amount of fluids you drink. · Get the vaccines (if you have not already) to protect yourself from hepatitis A and hepatitis B, influenza, and pneumococcus. · The infection can make you itch. Keep cool and stay out of the sun. Try to wear cotton clothing. Talk to your doctor about using over-the-counter medicines for itching. These include diphenhydramine (Benadryl) and chlorpheniramine (Chlor-Trimeton). Follow the instructions on the label. · If you feel depressed, talk to your doctor about treatment. Many people who have long-term illnesses get depressed. Keep in mind that antiviral medicine can make depression worse. To avoid spreading hepatitis C to others  · Tell the people that you live with or have sex with about your illness as soon as you can. · Don't share needles to inject drugs. Don't share other equipment (such as cotton, spoons, and water) with others. Find out if a needle exchange program is available in your area, and use it. Get into a drug treatment program.  · Practice safer sex. Reduce your number of sex partners if you have more than one. Unless you are in a long-term relationship in which neither partner has sex with anyone else, always use latex condoms when you have sex. · Don't donate blood or blood products, organs, semen, or eggs (ova).   · Make sure that all equipment is sterilized if you get a tattoo, have your body pierced, or have acupuncture. · Do not share your personal items. These include razors, toothbrushes, towels, and nail files. · Tell your doctor, dentist, and anyone else who may come in contact with your blood about your illness. · Prevent others from coming in contact with your blood and other body fluids. Keep any cuts, scrapes, or blisters covered. · Wash your handsand any object that has come in contact with your bloodthoroughly with water and soap. When should you call for help? Call 911 anytime you think you may need emergency care. For example, call if:  · You passed out (lost consciousness). · You have severe trouble breathing. · You feel very confused and can't think clearly. · You vomit blood or what looks like coffee grounds. · You pass maroon or very bloody stools. Call your doctor now or seek immediate medical care if:  · You have any trouble breathing. · You have new bruises or blood spots under your skin. · Your stools are black and tarlike or have streaks of blood. · You have signs of needing more fluids. You have sunken eyes and a dry mouth, and you pass only a little dark urine. Watch closely for changes in your health, and be sure to contact your doctor if:  · You have a nosebleed. · Your gums bleed when you brush your teeth. Where can you learn more? Go to http://drew-tarun.info/. Enter E564 in the search box to learn more about \"Hepatitis C: Care Instructions. \"  Current as of: May 24, 2016  Content Version: 11.1  © 2296-0976 [employer]. Care instructions adapted under license by [employer] (which disclaims liability or warranty for this information). If you have questions about a medical condition or this instruction, always ask your healthcare professional. [address] any warranty or liability for your use of this information.

## 2017-02-09 ENCOUNTER — TELEPHONE (OUTPATIENT)
Dept: FAMILY MEDICINE CLINIC | Age: 62
End: 2017-02-09

## 2017-02-09 NOTE — TELEPHONE ENCOUNTER
Medication: Dexilant 30mg  , dose: 1 tab, how often: every day as needed , current number of medication days provided: 90, refill per application. Lot #: [id number], EXP.02/18. This medication was received and verified for the following 1. Correct Patient, 2. Correct Diagnosis, 3. Correct Drug, 4. Correct route, and no current allergy to medication. Please contact patient to come  their medications.      Deandre Alexandra MSN,RN,AGNP-C     [facility]

## 2017-02-14 ENCOUNTER — TELEPHONE (OUTPATIENT)
Dept: FAMILY MEDICINE CLINIC | Age: 62
End: 2017-02-14

## 2017-02-16 NOTE — TELEPHONE ENCOUNTER
Medication: Nasonex, dose: 2 sprays both nostrils, how often: qd, current number of medication days provided: 90, refill per application LOT #[id number], EXP.01/18. . This medication was received and verified for the following 1. Correct Patient, 2. Correct Diagnosis, 3. Correct Drug, 4. Correct route, and no current allergy to medication.

## 2017-02-22 ENCOUNTER — TELEPHONE (OUTPATIENT)
Dept: FAMILY MEDICINE CLINIC | Age: 62
End: 2017-02-22

## 2017-02-22 NOTE — TELEPHONE ENCOUNTER
Medication: Advair 100/50, dose: 1 inhalation, how often: twice daily, current number of medication days provided: 90, refill per application. LOT #[id number], EXP.04/18  This medication was received and verified for the following 1. Correct Patient, 2. Correct Diagnosis, 3. Correct Drug, 4. Correct route, and no current allergy to medication.

## 2017-02-23 ENCOUNTER — TELEPHONE (OUTPATIENT)
Dept: FAMILY MEDICINE CLINIC | Age: 62
End: 2017-02-23

## 2017-02-23 NOTE — TELEPHONE ENCOUNTER
Medication: Proventil HFA, dose: 1-2 puffs, how often: every 4 hours as needed for wheezing, current number of medication days provided: 90, refill per application. Lot [id number], EXP.07/17. This medication was received and verified for the following 1. Correct Patient, 2. Correct Diagnosis, 3. Correct Drug, 4. Correct route, and no current allergy to medication. Please contact patient to come  their medications.      Pattie Swanson MSN, RN, FNP-C     [facility]

## 2017-04-18 ENCOUNTER — TELEPHONE (OUTPATIENT)
Dept: FAMILY MEDICINE CLINIC | Age: 62
End: 2017-04-18

## 2017-04-19 NOTE — TELEPHONE ENCOUNTER
Medication: Benicar HCT 40/25mg, dose: 1 tab, how often: daily, current number of medication days provided: 90, refill per application. LOT #[id number], EXP.05/19   This medication was received and verified for the following 1. Correct Patient, 2. Correct Diagnosis, 3. Correct Drug, 4. Correct route, and no current allergy to medication.

## 2017-04-23 RX ORDER — ERGOCALCIFEROL 1.25 MG/1
CAPSULE ORAL
Qty: 4 CAP | Refills: 8 | Status: SHIPPED | OUTPATIENT
Start: 2017-04-23 | End: 2018-10-23 | Stop reason: SDUPTHER

## 2017-04-27 ENCOUNTER — TELEPHONE (OUTPATIENT)
Dept: FAMILY MEDICINE CLINIC | Age: 62
End: 2017-04-27

## 2017-04-27 NOTE — TELEPHONE ENCOUNTER
Medication: Nasonex, dose: 2 sprays both nostrils, how often: qd, current number of medication days provided: 90, refill per application. Lot #: [id number], EXP.01/18  . This medication was received and verified for the following 1. Correct Patient, 2. Correct Diagnosis, 3. Correct Drug, 4. Correct route, and no current allergy to medication. Please contact patient to come  their medications.      Daniel Leahy, MSN, RN, [facility]

## 2017-05-02 ENCOUNTER — TELEPHONE (OUTPATIENT)
Dept: FAMILY MEDICINE CLINIC | Age: 62
End: 2017-05-02

## 2017-05-03 NOTE — TELEPHONE ENCOUNTER
Medication: Dexilant  30 mg  , dose: 1 cap, how often: every day as needed , current number of medication days provided: 90, refill per application. LOT #[id number], EXP.04/18    This medication was received and verified for the following 1. Correct Patient, 2. Correct Diagnosis, 3. Correct Drug, 4. Correct route, and no current allergy to medication.      Dany YoonD

## 2017-05-18 ENCOUNTER — TELEPHONE (OUTPATIENT)
Dept: FAMILY MEDICINE CLINIC | Age: 62
End: 2017-05-18

## 2017-05-18 NOTE — TELEPHONE ENCOUNTER
Medication: Proair 90mcg , dose: 1-2 puffs, how often: 4 as needed , current number of medication days provided: 90, refill per application. Lot #: [id number], EXP.02/18. This medication was received and verified for the following 1. Correct Patient, 2. Correct Diagnosis, 3. Correct Drug, 4. Correct route, and no current allergy to medication. Please contact patient to come  their medications.      SAMRA Powers, RN, FNP-C     [facility]

## 2017-05-23 ENCOUNTER — TELEPHONE (OUTPATIENT)
Dept: FAMILY MEDICINE CLINIC | Age: 62
End: 2017-05-23

## 2017-05-23 NOTE — TELEPHONE ENCOUNTER
Medication: Advair 100-50mcg, dose: 1 inhalation, how often: twice daily, current number of medication days provided: 90, refill per application. Lot #: [id number], EXP.06/18. This medication was received and verified for the following 1. Correct Patient, 2. Correct Diagnosis, 3. Correct Drug, 4. Correct route, and no current allergy to medication. Please contact patient to come  their medications.      SAMRA Vidal, RN, FNP-C     [facility]

## 2017-06-22 ENCOUNTER — OFFICE VISIT (OUTPATIENT)
Dept: FAMILY MEDICINE CLINIC | Age: 62
End: 2017-06-22

## 2017-06-22 VITALS
OXYGEN SATURATION: 97 % | SYSTOLIC BLOOD PRESSURE: 117 MMHG | TEMPERATURE: 98.1 F | RESPIRATION RATE: 16 BRPM | WEIGHT: 234 LBS | HEART RATE: 81 BPM | DIASTOLIC BLOOD PRESSURE: 76 MMHG | HEIGHT: 67 IN | BODY MASS INDEX: 36.73 KG/M2

## 2017-06-22 DIAGNOSIS — M25.532 WRIST PAIN, LEFT: Primary | ICD-10-CM

## 2017-06-22 RX ORDER — IBUPROFEN 800 MG/1
800 TABLET ORAL
Qty: 30 TAB | Refills: 0 | Status: SHIPPED | OUTPATIENT
Start: 2017-06-22 | End: 2018-04-24 | Stop reason: ALTCHOICE

## 2017-06-22 NOTE — MR AVS SNAPSHOT
Visit Information Date & Time Provider Department Dept. Phone Encounter #  
 6/22/2017  9:30 AM Dolores Jurado NP [address] [facility] [id number] Follow-up Instructions Return if symptoms worsen or fail to improve. Your Appointments 7/24/2017 10:30 AM  
Follow Up with Dolores Jurado NP [address] [facility] ([address]) Appt Note: 6 month follow up  
 [address] [facility] [id number]  
[address] [facility] [id number] Upcoming Health Maintenance Date Due DTaP/Tdap/Td series (1 - Tdap) 12/14/1978 FOBT Q 1 YEAR AGE 50-75 12/11/2015 Pneumococcal 19-64 Medium Risk (1 of 1 - PPSV23) 1/24/2018* INFLUENZA AGE 9 TO ADULT 8/1/2017 PAP AKA CERVICAL CYTOLOGY 1/6/2018 BREAST CANCER SCRN MAMMOGRAM 3/7/2019 *Topic was postponed. The date shown is not the original due date. Allergies as of 6/22/2017  Review Complete On: 6/22/2017 By: Nelsy Crowley. Nuvia Russo LPN Severity Noted Reaction Type Reactions Compazine [Prochlorperazine]  06/07/2013    Hives Current Immunizations  Reviewed on 11/18/2015 Name Date Influenza Vaccine (Quad) PF 10/17/2016, 11/18/2015 Influenza Vaccine PF 10/31/2013 Not reviewed this visit You Were Diagnosed With   
  
 Codes Comments Wrist pain, left    -  Primary ICD-10-CM: D01.601 ICD-9-CM: 719.43 Vitals BP Pulse Temp Resp Height(growth percentile) Weight(growth percentile) 117/76 (BP 1 Location: Left arm, BP Patient Position: Sitting) 81 98.1 °F (36.7 °C) (Oral) 16 5' 7\" (1.702 m) 234 lb (106.1 kg) SpO2 BMI OB Status Smoking Status 97% 36.65 kg/m2 Postmenopausal Current Every Day Smoker BMI and BSA Data Body Mass Index Body Surface Area  
 36.65 kg/m 2 2.24 m 2 Preferred Pharmacy Pharmacy Name Phone Adiel Crawley [address], [address] [facility] [phone] Your Updated Medication List  
  
   
This list is accurate as of: 6/22/17 10:02 AM.  Always use your most recent med list.  
  
  
  
  
 albuterol 90 mcg/actuation inhaler Commonly known as:  PROAIR HFA Take 1-2 Puffs by inhalation every four (4) hours as needed for Wheezing. CLEOCIN T 1 % topical gel Generic drug:  clindamycin Apply  to affected area two (2) times a day. use thin film on affected area  
  
 clobetasol 0.05 % ointment Commonly known as:  Doreen Nancy Apply  to affected area two (2) times a day. dexlansoprazole 30 mg capsule Commonly known as:  DEXILANT Take 1 Cap by mouth daily as needed. Indications: GASTROESOPHAGEAL REFLUX  
  
 fluticasone-salmeterol 100-50 mcg/dose diskus inhaler Commonly known as:  ADVAIR Take 1 Puff by inhalation every twelve (12) hours. ibuprofen 800 mg tablet Commonly known as:  MOTRIN Take 1 Tab by mouth every eight (8) hours as needed for Pain. Indications: Pain  
  
 ledipasvir-sofosbuvir  mg Tab Commonly known as:  Dennis Laine Take 1 Tab by mouth daily. Indications: CHRONIC HEPATITIS C - GENOTYPE 1  
  
 magnesium oxide 400 mg tablet Commonly known as:  MAG-OX Take 1 Tab by mouth daily. mometasone 50 mcg/actuation nasal spray Commonly known as:  NASONEX  
2 Sprays by Both Nostrils route daily. olmesartan-hydroCHLOROthiazide 40-25 mg per tablet Commonly known as:  BENICAR HCT Take 1 Tab by mouth daily. Indications: Hypertension  
  
 travoprost 0.004 % ophthalmic solution Commonly known as:  TRAVATAN Z Administer 1 Drop to both eyes every evening. Indications: OPEN ANGLE GLAUCOMA  
  
 verapamil 120 mg tablet Commonly known as:  CALAN Take 1 Tab by mouth two (2) times a day. Indications: Hypertension VITAMIN D2 50,000 unit capsule Generic drug:  ergocalciferol TAKE ONE CAPSULE BY MOUTH EVERY 7 DAYS Prescriptions Sent to Pharmacy Refills  
 ibuprofen (MOTRIN) 800 mg tablet 0 Sig: Take 1 Tab by mouth every eight (8) hours as needed for Pain. Indications: Pain Class: Normal  
 Pharmacy: Marce Dsouza [address] [place] [address] #: [phone] Route: Oral  
  
Follow-up Instructions Return if symptoms worsen or fail to improve. Patient Instructions Joint Pain: Care Instructions Your Care Instructions Many people have small aches and pains from overuse or injury to muscles and joints. Joint injuries often happen during sports or recreation, work tasks, or projects around the home. An overuse injury can happen when you put too much stress on a joint or when you do an activity that stresses the joint over and over, such as using the computer or rowing a boat. You can take action at home to help your muscles and joints get better. You should feel better in 1 to 2 weeks, but it can take 3 months or more to heal completely. Follow-up care is a key part of your treatment and safety. Be sure to make and go to all appointments, and call your doctor if you are having problems. It's also a good idea to know your test results and keep a list of the medicines you take. How can you care for yourself at home? · Do not put weight on the injured joint for at least a day or two. · For the first day or two after an injury, do not take hot showers or baths, and do not use hot packs. The heat could make swelling worse. · Put ice or a cold pack on the sore joint for 10 to 20 minutes at a time. Try to do this every 1 to 2 hours for the next 3 days (when you are awake) or until the swelling goes down. Put a thin cloth between the ice and your skin. · Wrap the injury in an elastic bandage. Do not wrap it too tightly because this can cause more swelling.  
· Prop up the sore joint on a pillow when you ice it or anytime you sit or lie down during the next 3 days. Try to keep it above the level of your heart. This will help reduce swelling. · Take an over-the-counter pain medicine, such as acetaminophen (Tylenol), ibuprofen (Advil, Motrin), or naproxen (Aleve). Read and follow all instructions on the label. · After 1 or 2 days of rest, begin moving the joint gently. While the joint is still healing, you can begin to exercise using activities that do not strain or hurt the painful joint. When should you call for help? Call your doctor now or seek immediate medical care if: 
· You have signs of infection, such as: 
¨ Increased pain, swelling, warmth, and redness. ¨ Red streaks leading from the joint. ¨ A fever. Watch closely for changes in your health, and be sure to contact your doctor if: 
· Your movement or symptoms are not getting better after 1 to 2 weeks of home treatment. Where can you learn more? Go to http://drew-tarun.info/. Enter P205 in the search box to learn more about \"Joint Pain: Care Instructions. \" Current as of: March 21, 2017 Content Version: 11.3 © 1063-8564 [employer]. Care instructions adapted under license by thinkingphones (which disclaims liability or warranty for this information). If you have questions about a medical condition or this instruction, always ask your healthcare professional. [address] any warranty or liability for your use of this information. Introducing [facility]! Dear Wilber 62: Thank you for requesting a [employer] account. Our records indicate that you already have an active [employer] account. You can access your account anytime at [email or web address] Did you know that you can access your hospital and ER discharge instructions at any time in [employer]? You can also review all of your test results from your hospital stay or ER visit. Additional Information If you have questions, please visit the Frequently Asked Questions section of the [email or web address] website at [email or web address] Remember, [employer] is NOT to be used for urgent needs. For medical emergencies, dial 911. Now available from your iPhone and Android! Please provide this summary of care documentation to your next provider. Your primary care clinician is listed as Dolores Jurado. If you have any questions after today's visit, please call [phone].

## 2017-06-22 NOTE — PATIENT INSTRUCTIONS
Joint Pain: Care Instructions  Your Care Instructions  Many people have small aches and pains from overuse or injury to muscles and joints. Joint injuries often happen during sports or recreation, work tasks, or projects around the home. An overuse injury can happen when you put too much stress on a joint or when you do an activity that stresses the joint over and over, such as using the computer or rowing a boat. You can take action at home to help your muscles and joints get better. You should feel better in 1 to 2 weeks, but it can take 3 months or more to heal completely. Follow-up care is a key part of your treatment and safety. Be sure to make and go to all appointments, and call your doctor if you are having problems. It's also a good idea to know your test results and keep a list of the medicines you take. How can you care for yourself at home? · Do not put weight on the injured joint for at least a day or two. · For the first day or two after an injury, do not take hot showers or baths, and do not use hot packs. The heat could make swelling worse. · Put ice or a cold pack on the sore joint for 10 to 20 minutes at a time. Try to do this every 1 to 2 hours for the next 3 days (when you are awake) or until the swelling goes down. Put a thin cloth between the ice and your skin. · Wrap the injury in an elastic bandage. Do not wrap it too tightly because this can cause more swelling. · Prop up the sore joint on a pillow when you ice it or anytime you sit or lie down during the next 3 days. Try to keep it above the level of your heart. This will help reduce swelling. · Take an over-the-counter pain medicine, such as acetaminophen (Tylenol), ibuprofen (Advil, Motrin), or naproxen (Aleve). Read and follow all instructions on the label. · After 1 or 2 days of rest, begin moving the joint gently.  While the joint is still healing, you can begin to exercise using activities that do not strain or hurt the painful joint. When should you call for help? Call your doctor now or seek immediate medical care if:  · You have signs of infection, such as:  ¨ Increased pain, swelling, warmth, and redness. ¨ Red streaks leading from the joint. ¨ A fever. Watch closely for changes in your health, and be sure to contact your doctor if:  · Your movement or symptoms are not getting better after 1 to 2 weeks of home treatment. Where can you learn more? Go to http://drew-tarun.info/. Enter P205 in the search box to learn more about \"Joint Pain: Care Instructions. \"  Current as of: March 21, 2017  Content Version: 11.3  © 2720-4524 [employer]. Care instructions adapted under license by [employer] (which disclaims liability or warranty for this information). If you have questions about cea medical condition or this instruction, always ask your healthcare professional. [address] any warranty or liability for your use of this information.

## 2017-06-22 NOTE — PROGRESS NOTES
SULEIMAN THOMPSON [facility]  [address] [facility], [address]  [phone] office/[phone] fax      6/22/2017    Reason for visit:   Chief Complaint   Patient presents with    Wrist Pain     sick visit with c/o left wrist pain  and left hand pain level 7 of 10 x > 1 month       Patient: Carlos Rutledge, 1955, [id number]       Primary MD: Maria Luisa Mirza NP    Subjective:   Carlos Rutledge, a 63 y.o. 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 Does do some lifting with her aide position. Denies h/o surgery, trauma, falls, carpal tunnel    Wrist Pain    The history is provided by the patient. This is a new problem. The current episode started more than 1 week ago. The problem occurs daily. The problem has been gradually improving. The pain is present in the left wrist. The quality of the pain is described as aching. The pain is at a severity of 7/10. The pain is moderate. Associated symptoms include limited range of motion. Pertinent negatives include no numbness, no stiffness, no tingling, no itching, no back pain and no neck pain. The symptoms are aggravated by movement. She has tried rest and cold for the symptoms. The treatment provided mild relief. There has been no history of extremity trauma.        Past Medical History:   Diagnosis Date    Arthritis of knee, left 2007    Erythrasma May 2014    beba feet, lower left leg    Glaucoma     H/O mammogram 03/01/2016    normal, f/u in 1 year    Hepatitis C 2002    genotype 1a, previous IV drug user, declined interferons     Hypertension 2002    Tobacco use     working with [employer] [facility] [employer] smoking cessation        Past Surgical History:   Procedure Laterality Date    HX OTHER SURGICAL  2002    liver biopsy       Social History     Social History    Marital status:      Spouse name: N/A    Number of children: 2    Years of education: N/A     Occupational History     [address]      Social History Main Topics    Smoking status: Current Every Day Smoker     Packs/day: 0.50     Types: Cigarettes     Start date: 1/12/1974    Smokeless tobacco: Not on file    Alcohol use No    Drug use: Yes      Comment: IV crack cocaine, in remission since 2002    Sexual activity: Yes     Partners: Male     Other Topics Concern     Service No    Blood Transfusions No    Caffeine Concern No    Occupational Exposure No    Hobby Hazards No    Sleep Concern No    Stress Concern No    Weight Concern Yes     want bypass    Special Diet No    Back Care No    Exercise No    Bike Helmet No    Seat Belt No    Self-Exams No     Social History Narrative       Allergies   Allergen Reactions    Compazine [Prochlorperazine] Hives       Current Outpatient Prescriptions on File Prior to Visit   Medication Sig Dispense Refill    VITAMIN D2 50,000 unit capsule TAKE ONE CAPSULE BY MOUTH EVERY 7 DAYS 4 Cap 8    fluticasone-salmeterol (ADVAIR) 100-50 mcg/dose diskus inhaler Take 1 Puff by inhalation every twelve (12) hours. 3 Inhaler 3    albuterol (PROAIR HFA) 90 mcg/actuation inhaler Take 1-2 Puffs by inhalation every four (4) hours as needed for Wheezing. 3 Inhaler 3    olmesartan-hydroCHLOROthiazide (BENICAR HCT) 40-25 mg per tablet Take 1 Tab by mouth daily. Indications: Hypertension 90 Tab 3    dexlansoprazole (DEXILANT) 30 mg capsule Take 1 Cap by mouth daily as needed. Indications: GASTROESOPHAGEAL REFLUX 90 Cap 3    mometasone (NASONEX) 50 mcg/actuation nasal spray 2 Sprays by Both Nostrils route daily. 2 Container 0    verapamil (CALAN) 120 mg tablet Take 1 Tab by mouth two (2) times a day. Indications: Hypertension 60 Tab 5    clobetasol (TEMOVATE) 0.05 % ointment Apply  to affected area two (2) times a day.       travoprost (TRAVATAN Z) 0. 004 % ophthalmic solution Administer 1 Drop to both eyes every evening. Indications: OPEN ANGLE GLAUCOMA 5 mL 3    clindamycin (CLEOCIN T) 1 % topical gel Apply  to affected area two (2) times a day. use thin film on affected area      magnesium oxide (MAG-OX) 400 mg tablet Take 1 Tab by mouth daily. 30 Tab 11    ledipasvir-sofosbuvir (HARVONI)  mg tab Take 1 Tab by mouth daily. Indications: CHRONIC HEPATITIS C - GENOTYPE 1 90 Tab 0     No current facility-administered medications on file prior to visit. Review of Systems   Constitutional: Negative. Negative for fever. Musculoskeletal: Positive for joint pain (Left wrist pain). Negative for back pain, falls, neck pain and stiffness. Skin: Negative for itching. Neurological: Negative for tingling and numbness. Objective:   Visit Vitals    /76 (BP 1 Location: Left arm, BP Patient Position: Sitting)    Pulse 81    Temp 98.1 °F (36.7 °C) (Oral)    Resp 16    Ht 5' 7\" (1.702 m)    Wt 234 lb (106.1 kg)    SpO2 97%    BMI 36.65 kg/m2      Wt Readings from Last 3 Encounters:   06/22/17 234 lb (106.1 kg)   01/24/17 232 lb 12.8 oz (105.6 kg)   01/09/17 234 lb (106.1 kg)     Lab Results   Component Value Date/Time    Glucose 95 01/16/2017 09:37 AM         Physical Exam   Constitutional: She is oriented to person, place, and time. She appears well-developed and well-nourished. No distress. Musculoskeletal:        Left wrist: She exhibits decreased range of motion, tenderness and swelling. She exhibits no bony tenderness, no effusion, no crepitus, no deformity and no laceration. No atrophy  Strength 4/5   Negative tinels sign   Unable to perform phalens due to pain   Cap refill WDL    Neurological: She is alert and oriented to person, place, and time. She has normal reflexes. Skin: She is not diaphoretic.          Assessment:    Derrell Cabezaser who has risk factors including (see above previous medical hx) and: ICD-10-CM ICD-9-CM    1. Wrist pain, left M25.532 719.43 ibuprofen (MOTRIN) 800 mg tablet   1. Wrist pain, left  -Diff Dx: Overuse injury, tendonitis/tendonopathy, carpal tunnel, Arthritis, Gout  -Rest, Ice,and splinting   - ibuprofen (MOTRIN) 800 mg tablet; Take 1 Tab by mouth every eight (8) hours as needed for Pain. Indications: Pain  Dispense: 30 Tab; Refill: 0    Written instructions followed our verbal discussion of all information discussed above, pending tests ordered and future goals/plans. Patient expressed understanding of current diagnosis, planned testing, follow up and if needed to contact the office for any questions or concerns prior to the next visit. Plan:   Med reconciliation completed with patient. Reviewed side effects of medications with the patient. Questions were answered and patient verb understanding. Orders Placed This Encounter    ibuprofen (MOTRIN) 800 mg tablet     Sig: Take 1 Tab by mouth every eight (8) hours as needed for Pain. Indications: Pain     Dispense:  30 Tab     Refill:  0     Current Outpatient Prescriptions   Medication Sig Dispense Refill    ibuprofen (MOTRIN) 800 mg tablet Take 1 Tab by mouth every eight (8) hours as needed for Pain. Indications: Pain 30 Tab 0    VITAMIN D2 50,000 unit capsule TAKE ONE CAPSULE BY MOUTH EVERY 7 DAYS 4 Cap 8    fluticasone-salmeterol (ADVAIR) 100-50 mcg/dose diskus inhaler Take 1 Puff by inhalation every twelve (12) hours. 3 Inhaler 3    albuterol (PROAIR HFA) 90 mcg/actuation inhaler Take 1-2 Puffs by inhalation every four (4) hours as needed for Wheezing. 3 Inhaler 3    olmesartan-hydroCHLOROthiazide (BENICAR HCT) 40-25 mg per tablet Take 1 Tab by mouth daily. Indications: Hypertension 90 Tab 3    dexlansoprazole (DEXILANT) 30 mg capsule Take 1 Cap by mouth daily as needed. Indications: GASTROESOPHAGEAL REFLUX 90 Cap 3    mometasone (NASONEX) 50 mcg/actuation nasal spray 2 Sprays by Both Nostrils route daily.  2 Container 0    verapamil (CALAN) 120 mg tablet Take 1 Tab by mouth two (2) times a day. Indications: Hypertension 60 Tab 5    clobetasol (TEMOVATE) 0.05 % ointment Apply  to affected area two (2) times a day.  travoprost (TRAVATAN Z) 0.004 % ophthalmic solution Administer 1 Drop to both eyes every evening. Indications: OPEN ANGLE GLAUCOMA 5 mL 3    clindamycin (CLEOCIN T) 1 % topical gel Apply  to affected area two (2) times a day. use thin film on affected area      magnesium oxide (MAG-OX) 400 mg tablet Take 1 Tab by mouth daily. 30 Tab 11    ledipasvir-sofosbuvir (HARVONI)  mg tab Take 1 Tab by mouth daily. Indications: CHRONIC HEPATITIS C - GENOTYPE 1 90 Tab 0     There are no discontinued medications. Follow-up Disposition:  Return if symptoms worsen or fail to improve. \"No Show policy was reviewed with the patient. The services affected are the nurse navigator and the provider. No show appointments include missing labs for a future scheduled appointment, Pap/pelvics, arriving to appointment more than 10 minutes late, and calling to cancel appointment less than 24 hours in advance. After the 3rd No Show, the patient will be removed from the Foundation to include medications for 6 months. The patient will be referred to the [place] [address] for their primary care needs. \"     Aroldo Graham, [address] I spent 25 minutes with the patient in face-to-face consultation, of which greater than 50% was spent in counseling and coordination of care as described above.

## 2017-06-22 NOTE — LETTER
NOTIFICATION RETURN TO WORK / SCHOOL 
 
6/22/2017 10:01 AM 
 
Ms. Aubrie Prieto [address] [facility] [address] To Whom It May Concern: 
 
Aubrie Prieto is currently under the care [facility] [address] [place] [place] [address]. She will return to work/school on: 6/23/17 If there are questions or concerns please have the patient contact our office.  
 
 
 
Sincerely, 
 
 
Christopher Montemayor NP

## 2017-06-22 NOTE — LETTER
6/22/2017 [address] [facility] [address] Rutledge, 1955, is picking up the following medications ordered from the [facility]. ADVAIR DISKUS 100/50 MCG QUANTITY: 3 & PROAIR HFA QUANTITY: 3 He Nuñez Patient's Signature:_________________________________________________ Today's Date: 6/22/2017

## 2017-06-22 NOTE — LETTER
6/22/2017 9:56 AM 
 
Ms. Janine Cotton [address] [facility] [address] [address] To whom it may concern, Janine Cotton is a patient of the [facility] and she is in need of the Hepatitis A/B immunizations to continue her care. As a patient of our office she has been screened and has a limited income. Please consider a reduction in cost for these vaccinations. Without these vaccinations she can not obtain the necessary treatment that will improve her quality of life. If you have any questions please contact me at your convenience.    
 
 
 
 
 
Sincerely, 
 
 
Wong Samanigeo NP

## 2017-06-26 ENCOUNTER — TELEPHONE (OUTPATIENT)
Dept: FAMILY MEDICINE CLINIC | Age: 62
End: 2017-06-26

## 2017-06-26 NOTE — TELEPHONE ENCOUNTER
Medication: Cleocin 1% , dose: apply to affected areas, how often: BID , current number of medication days provided: 90, refill per application. Lot #: [id number], EXP.07/18. This medication was received and verified for the following 1. Correct Patient, 2. Correct Diagnosis, 3. Correct Drug, 4. Correct route, and no current allergy to medication. Please contact patient to come  their medications.      Noman Horner MSN, RN, [facility]

## 2017-07-14 DIAGNOSIS — I10 ESSENTIAL HYPERTENSION: ICD-10-CM

## 2017-07-14 DIAGNOSIS — E83.42 HYPOMAGNESEMIA: Primary | ICD-10-CM

## 2017-07-19 ENCOUNTER — HOSPITAL ENCOUNTER (OUTPATIENT)
Dept: LAB | Age: 62
Discharge: HOME | End: 2017-07-19

## 2017-07-19 ENCOUNTER — LAB ONLY (OUTPATIENT)
Dept: FAMILY MEDICINE CLINIC | Age: 62
End: 2017-07-19

## 2017-07-19 DIAGNOSIS — E83.42 HYPOMAGNESEMIA: ICD-10-CM

## 2017-07-19 DIAGNOSIS — I10 ESSENTIAL HYPERTENSION: ICD-10-CM

## 2017-07-19 DIAGNOSIS — E83.42 HYPOMAGNESEMIA: Primary | ICD-10-CM

## 2017-07-19 LAB
ALBUMIN SERPL BCP-MCNC: 3.4 G/DL (ref 3.4–5)
ALBUMIN/GLOB SERPL: 0.8 {RATIO} (ref 0.8–1.7)
ALP SERPL-CCNC: 89 U/L (ref 45–117)
ALT SERPL-CCNC: 173 U/L (ref 13–56)
ANION GAP BLD CALC-SCNC: 8 MMOL/L (ref 3–18)
AST SERPL W P-5'-P-CCNC: 179 U/L (ref 15–37)
BILIRUB SERPL-MCNC: 1 MG/DL (ref 0.2–1)
BUN SERPL-MCNC: 14 MG/DL (ref 7–18)
BUN/CREAT SERPL: 19 (ref 12–20)
CALCIUM SERPL-MCNC: 8.7 MG/DL (ref 8.5–10.1)
CHLORIDE SERPL-SCNC: 108 MMOL/L (ref 100–108)
CO2 SERPL-SCNC: 24 MMOL/L (ref 21–32)
CREAT SERPL-MCNC: 0.72 MG/DL (ref 0.6–1.3)
GLOBULIN SER CALC-MCNC: 4.1 G/DL (ref 2–4)
GLUCOSE SERPL-MCNC: 85 MG/DL (ref 74–99)
MAGNESIUM SERPL-MCNC: 1.9 MG/DL (ref 1.6–2.6)
POTASSIUM SERPL-SCNC: 3.9 MMOL/L (ref 3.5–5.5)
PROT SERPL-MCNC: 7.5 G/DL (ref 6.4–8.2)
SODIUM SERPL-SCNC: 140 MMOL/L (ref 136–145)

## 2017-07-19 PROCEDURE — 83735 ASSAY OF MAGNESIUM: CPT | Performed by: NURSE PRACTITIONER

## 2017-07-19 PROCEDURE — 80053 COMPREHEN METABOLIC PANEL: CPT | Performed by: NURSE PRACTITIONER

## 2017-07-19 NOTE — PROGRESS NOTES
Patient name, date of birth and orders verified before specimen collection. Rt  Hand is dry and intact. 23g butterfly  was utilized to collect specimen. Pt tolerated procedure well.

## 2017-07-23 ENCOUNTER — HOSPITAL ENCOUNTER (OUTPATIENT)
Dept: LAB | Age: 62
Discharge: HOME | End: 2017-07-23

## 2017-07-23 DIAGNOSIS — Z12.11 COLON CANCER SCREENING: ICD-10-CM

## 2017-07-23 PROCEDURE — 82274 ASSAY TEST FOR BLOOD FECAL: CPT | Performed by: NURSE PRACTITIONER

## 2017-07-24 ENCOUNTER — OFFICE VISIT (OUTPATIENT)
Dept: FAMILY MEDICINE CLINIC | Age: 62
End: 2017-07-24

## 2017-07-24 VITALS
WEIGHT: 229.2 LBS | HEART RATE: 94 BPM | RESPIRATION RATE: 16 BRPM | HEIGHT: 67 IN | SYSTOLIC BLOOD PRESSURE: 126 MMHG | TEMPERATURE: 98.1 F | DIASTOLIC BLOOD PRESSURE: 87 MMHG | BODY MASS INDEX: 35.97 KG/M2 | OXYGEN SATURATION: 97 %

## 2017-07-24 DIAGNOSIS — Z72.0 TOBACCO USE: ICD-10-CM

## 2017-07-24 DIAGNOSIS — I10 ESSENTIAL HYPERTENSION: Primary | ICD-10-CM

## 2017-07-24 DIAGNOSIS — B18.2 CHRONIC HEPATITIS C WITHOUT HEPATIC COMA (HCC): ICD-10-CM

## 2017-07-24 DIAGNOSIS — Z12.11 COLON CANCER SCREENING: ICD-10-CM

## 2017-07-24 DIAGNOSIS — R25.2 CRAMP OF BOTH LOWER EXTREMITIES: ICD-10-CM

## 2017-07-24 DIAGNOSIS — K21.9 GASTROESOPHAGEAL REFLUX DISEASE WITHOUT ESOPHAGITIS: ICD-10-CM

## 2017-07-24 NOTE — MR AVS SNAPSHOT
Visit Information Date & Time Provider Department Dept. Phone Encounter #  
 7/24/2017 10:30 AM Mallory Vásquez NP [address] [facility] [id number] Follow-up Instructions Return in about 6 months (around 1/24/2018), or if symptoms worsen or fail to improve. Upcoming Health Maintenance Date Due DTaP/Tdap/Td series (1 - Tdap) 12/14/1978 FOBT Q 1 YEAR AGE 50-75 12/11/2015 Pneumococcal 19-64 Medium Risk (1 of 1 - PPSV23) 1/24/2018* INFLUENZA AGE 9 TO ADULT 8/1/2017 PAP AKA CERVICAL CYTOLOGY 1/6/2018 BREAST CANCER SCRN MAMMOGRAM 3/7/2019 *Topic was postponed. The date shown is not the original due date. Allergies as of 7/24/2017  Review Complete On: 7/24/2017 By: Maday Doshi. Mark Malik LPN Severity Noted Reaction Type Reactions Compazine [Prochlorperazine]  06/07/2013    Hives Current Immunizations  Reviewed on 11/18/2015 Name Date Influenza Vaccine (Quad) PF 10/17/2016, 11/18/2015 Influenza Vaccine PF 10/31/2013 Not reviewed this visit You Were Diagnosed With   
  
 Codes Comments Essential hypertension    -  Primary ICD-10-CM: I10 
ICD-9-CM: 401.9 Chronic hepatitis C without hepatic coma (HCC)     ICD-10-CM: B18.2 ICD-9-CM: 070.54 Gastroesophageal reflux disease without esophagitis     ICD-10-CM: K21.9 ICD-9-CM: 530.81 Tobacco use     ICD-10-CM: Z72.0 ICD-9-CM: 305.1 Colon cancer screening     ICD-10-CM: Z12.11 ICD-9-CM: V76.51 Vitals BP Pulse Temp Resp Height(growth percentile) Weight(growth percentile) 126/87 (BP 1 Location: Left arm, BP Patient Position: Sitting) 94 98.1 °F (36.7 °C) (Oral) 16 5' 7\" (1.702 m) 229 lb 3.2 oz (104 kg) SpO2 BMI OB Status Smoking Status 97% 35.9 kg/m2 Postmenopausal Current Every Day Smoker BMI and BSA Data Body Mass Index Body Surface Area 35.9 kg/m 2 2.22 m 2 Preferred Pharmacy Pharmacy Name Phone [facility] [address] [facility] [phone] Your Updated Medication List  
  
   
This list is accurate as of: 7/24/17 10:55 AM.  Always use your most recent med list.  
  
  
  
  
 albuterol 90 mcg/actuation inhaler Commonly known as:  PROAIR HFA Take 1-2 Puffs by inhalation every four (4) hours as needed for Wheezing. clobetasol 0.05 % ointment Commonly known as:  Brandy Sanders Apply  to affected area two (2) times a day. dexlansoprazole 30 mg capsule Commonly known as:  DEXILANT Take 1 Cap by mouth daily as needed. Indications: GASTROESOPHAGEAL REFLUX  
  
 fluticasone-salmeterol 100-50 mcg/dose diskus inhaler Commonly known as:  ADVAIR Take 1 Puff by inhalation every twelve (12) hours. ibuprofen 800 mg tablet Commonly known as:  MOTRIN Take 1 Tab by mouth every eight (8) hours as needed for Pain. Indications: Pain  
  
 ledipasvir-sofosbuvir  mg Tab Commonly known as:  Lavena Galas Take 1 Tab by mouth daily. Indications: CHRONIC HEPATITIS C - GENOTYPE 1  
  
 magnesium oxide 400 mg tablet Commonly known as:  MAG-OX Take 1 Tab by mouth daily. mometasone 50 mcg/actuation nasal spray Commonly known as:  NASONEX  
2 Sprays by Both Nostrils route daily. olmesartan-hydroCHLOROthiazide 40-25 mg per tablet Commonly known as:  BENICAR HCT Take 1 Tab by mouth daily. Indications: Hypertension  
  
 travoprost 0.004 % ophthalmic solution Commonly known as:  TRAVATAN Z Administer 1 Drop to both eyes every evening. Indications: OPEN ANGLE GLAUCOMA  
  
 verapamil 120 mg tablet Commonly known as:  CALAN Take 1 Tab by mouth two (2) times a day. Indications: Hypertension VITAMIN D2 50,000 unit capsule Generic drug:  ergocalciferol TAKE ONE CAPSULE BY MOUTH EVERY 7 DAYS Follow-up Instructions  Return in about 6 months (around 1/24/2018), or if symptoms worsen or fail to improve. To-Do List   
 07/24/2017 Lab:  OCCULT BLOOD, IMMUNOASSAY (FIT) Patient Instructions Learning About Hepatitis C What is hepatitis C? Hepatitis C is a liver infection. It is caused by the hepatitis C virus. The virus is spread through infected blood and body fluids. Hepatitis C is often spread when a person shares infected needles used to inject illegal drugs. It also can be spread if a person uses a needle that has infected blood on it. This could happen when you get a tattoo or piercing. Or it can happen when you get a shot in some developing countries where they use needles more than once to give shots. In rare cases, a mother with hepatitis C can spread the virus to her baby at birth. Or a health care worker may accidentally be exposed to blood that is infected with hepatitis C. There is a very small risk of getting the virus through sexual contact. The risk is higher if your sex partner also has HIV or another sexually transmitted infection, or if you have many sex partners. You can't get hepatitis C from casual contact. This is contact such as hugging, kissing, sneezing, coughing, and sharing food or drinks. What happens when you have hepatitis C? Some people who get hepatitis C have it for a short time and then get better. This is called acute hepatitis C. But most people get long-term, or chronic, hepatitis C. This can lead to liver damage as well as cirrhosis, liver cancer, and liver failure. Experts recommend that certain groups of people get tested for the virus. These include people who have signs of liver disease or have ever shared needles while using illegal drugs. Ask your doctor if testing is right for you. You can also buy a home test called a Home Access Hepatitis C Check kit at most drugstores. If the test shows that you have been exposed to the virus in the past, be sure to talk to your doctor to find out if you have the virus now. What are the symptoms? Most people who get hepatitis C do not have symptoms at first. Symptoms may include: · Tiredness. · Headache. · Sore muscles. · Nausea. · Pain in the upper right belly. · Yellowing of your skin and eyes (jaundice). · Dark urine. How can you prevent hepatitis C? There is no vaccine to prevent the disease. Anyone who has hepatitis C can spread the virus to someone else. You can take steps to make infection less likely. · Do not share needles to inject drugs. · Follow safety guidelines if you work in a health care setting. Wear protective gloves and clothing. Dispose of needles and other sharp objects properly. · Make sure all instruments and supplies are sterilized if you get a tattoo, have your body pierced, or have acupuncture. To avoid spreading hepatitis C if you have it: 
· Do not share needles or other equipment, such as cotton, spoons, and water, if you use needles to inject drugs. · Keep cuts, scrapes, and blisters covered. This will prevent others from coming in contact with your blood and other body fluids. Throw out any blood-soaked items such as used bandages. · Do not donate blood or sperm. · Wash your hands and anything that has come in contact with your blood. Use soap and water. · Do not share your toothbrush, razor, nail clippers, or anything else that might have your blood on it. · Use latex condoms during sex if you have HIV, multiple sex partners, or a sexually transmitted infection. How is hepatitis C treated? · If you have acute hepatitis C, your doctor will probably prescribe medicine. · If you have chronic hepatitis C, your treatment depends on whether you have liver damage, other health problems you may have, and how much virus is in your body and what type it is. · You will need to see your doctor regularly to have blood tests to check your liver. Follow-up care is a key part of your treatment and safety.  Be sure to make and go to all appointments, and call your doctor if you are having problems. It's also a good idea to know your test results and keep a list of the medicines you take. Where can you learn more? Go to http://drew-tarun.info/. Enter C666 in the search box to learn more about \"Learning About Hepatitis C.\" 
Current as of: March 3, 2017 Content Version: 11.3 © 5118-5526 [employer]. Care instructions adapted under license by [employer] (which disclaims liability or warranty for this information). If you have questions about a medical condition or this instruction, always ask your healthcare professional. [address] any warranty or liability for your use of this information. Learning About Benefits From Quitting Smoking How does quitting smoking make you healthier? If you're thinking about quitting smoking, you may have a few reasons to be smoke-free. Your health may be one of them. · When you quit smoking, you lower your risks for cancer, lung disease, heart attack, stroke, blood vessel disease, and blindness from macular degeneration. · When you're smoke-free, you get sick less often, and you heal faster. You are less likely to get colds, flu, bronchitis, and pneumonia. · As a nonsmoker, you may find that your mood is better and you are less stressed. When and how will you feel healthier? Quitting has real health benefits that start from day 1 of being smoke-free. And the longer you stay smoke-free, the healthier you get and the better you feel. The first hours · After just 20 minutes, your blood pressure and heart rate go down. That means there's less stress on your heart and blood vessels. · Within 12 hours, the level of carbon monoxide in your blood drops back to normal. That makes room for more oxygen. With more oxygen in your body, you may notice that you have more energy than when you smoked. After 2 weeks · Your lungs start to work better. · Your risk of heart attack starts to drop. After 1 month · When your lungs are clear, you cough less and breathe deeper, so it's easier to be active. · Your sense of taste and smell return. That means you can enjoy food more than you have since you started smoking. Over the years · After 1 year, your risk of heart disease is half what it would be if you kept smoking. · After 5 years, your risk of stroke starts to shrink. Within a few years after that, it's about the same as if you'd never smoked. · After 10 years, your risk of dying from lung cancer is cut by about half. And your risk for many other types of cancer is lower too. How would quitting help others in your life? When you quit smoking, you improve the health of everyone who now breathes in your smoke. · Their heart, lung, and cancer risks drop, much like yours. · They are sick less. For babies and small children, living smoke-free means they're less likely to have ear infections, pneumonia, and bronchitis. · If you're a woman who is or will be pregnant someday, quitting smoking means a healthier . · Children who are close to you are less likely to become adult smokers. Where can you learn more? Go to http://drew-tarun.info/. Enter [id number] [id number] [id number] in the search box to learn more about \"Learning About Benefits From Quitting Smoking. \" Current as of: 2017 Content Version: 11.3 © 4603-7462 [employer]. Care instructions adapted under license by [employer] (which disclaims liability or warranty for this information). If you have questions about a medical condition or this instruction, always ask your healthcare professional. [address] any warranty or liability for your use of this information. Introducing [facility]! Dear Carlos Flores: Thank you for requesting a [employer] account.   Our records indicate that you already have an active [employer] account. You can access your account anytime at [email or web address] Did you know that you can access your hospital and ER discharge instructions at any time in [employer]? You can also review all of your test results from your hospital stay or ER visit. Additional Information If you have questions, please visit the Frequently Asked Questions section of the [employer] website at [email or web address] Remember, [employer] is NOT to be used for urgent needs. For medical emergencies, dial 911. Now available from your iPhone and Android! Please provide this summary of care documentation to your next provider. Your primary care clinician is listed as Corey Peterson. If you have any questions after today's visit, please call [phone].

## 2017-07-24 NOTE — PATIENT INSTRUCTIONS
Learning About Hepatitis C  What is hepatitis C? Hepatitis C is a liver infection. It is caused by the hepatitis C virus. The virus is spread through infected blood and body fluids. Hepatitis C is often spread when a person shares infected needles used to inject illegal drugs. It also can be spread if a person uses a needle that has infected blood on it. This could happen when you get a tattoo or piercing. Or it can happen when you get a shot in some developing countries where they use needles more than once to give shots. In rare cases, a mother with hepatitis C can spread the virus to her baby at birth. Or a health care worker may accidentally be exposed to blood that is infected with hepatitis C. There is a very small risk of getting the virus through sexual contact. The risk is higher if your sex partner also has HIV or another sexually transmitted infection, or if you have many sex partners. You can't get hepatitis C from casual contact. This is contact such as hugging, kissing, sneezing, coughing, and sharing food or drinks. What happens when you have hepatitis C? Some people who get hepatitis C have it for a short time and then get better. This is called acute hepatitis C. But most people get long-term, or chronic, hepatitis C. This can lead to liver damage as well as cirrhosis, liver cancer, and liver failure. Experts recommend that certain groups of people get tested for the virus. These include people who have signs of liver disease or have ever shared needles while using illegal drugs. Ask your doctor if testing is right for you. You can also buy a home test called a Home Access Hepatitis C Check kit at [facility]. If the test shows that you have been exposed to the virus in the past, be sure to talk to your doctor to find out if you have the virus now. What are the symptoms?   Most people who get hepatitis C do not have symptoms at first. Symptoms may include:  · Tiredness. · Headache. · Sore muscles. · Nausea. · Pain in the upper right belly. · Yellowing of your skin and eyes (jaundice). · Dark urine. How can you prevent hepatitis C? There is no vaccine to prevent the disease. Anyone who has hepatitis C can spread the virus to someone else. You can take steps to make infection less likely. · Do not share needles to inject drugs. · Follow safety guidelines if you work in a health care setting. Wear protective gloves and clothing. Dispose of needles and other sharp objects properly. · Make sure all instruments and supplies are sterilized if you get a tattoo, have your body pierced, or have acupuncture. To avoid spreading hepatitis C if you have it:  · Do not share needles or other equipment, such as cotton, spoons, and water, if you use needles to inject drugs. · Keep cuts, scrapes, and blisters covered. This will prevent others from coming in contact with your blood and other body fluids. Throw out any blood-soaked items such as used bandages. · Do not donate blood or sperm. · Wash your hands and anything that has come in contact with your blood. Use soap and water. · Do not share your toothbrush, razor, nail clippers, or anything else that might have your blood on it. · Use latex condoms during sex if you have HIV, multiple sex partners, or a sexually transmitted infection. How is hepatitis C treated? · If you have acute hepatitis C, your doctor will probably prescribe medicine. · If you have chronic hepatitis C, your treatment depends on whether you have liver damage, other health problems you may have, and how much virus is in your body and what type it is. · You will need to see your doctor regularly to have blood tests to check your liver. Follow-up care is a key part of your treatment and safety. Be sure to make and go to all appointments, and call your doctor if you are having problems.  It's also a good idea to know your test results and keep a list of the medicines you take. Where can you learn more? Go to http://drew-tarun.info/. Enter C666 in the search box to learn more about \"Learning About Hepatitis C.\"  Current as of: March 3, 2017  Content Version: 11.3  © 6059-9945 [employer]. Care instructions adapted under license by [employer] (which disclaims liability or warranty for this information). If you have questions about a medical condition or this instruction, always ask your healthcare professional. [address] any warranty or liability for your use of this information. Learning About Benefits From Quitting Smoking  How does quitting smoking make you healthier? If you're thinking about quitting smoking, you may have a few reasons to be smoke-free. Your health may be one of them. · When you quit smoking, you lower your risks for cancer, lung disease, heart attack, stroke, blood vessel disease, and blindness from macular degeneration. · When you're smoke-free, you get sick less often, and you heal faster. You are less likely to get colds, flu, bronchitis, and pneumonia. · As a nonsmoker, you may find that your mood is better and you are less stressed. When and how will you feel healthier? Quitting has real health benefits that start from day 1 of being smoke-free. And the longer you stay smoke-free, the healthier you get and the better you feel. The first hours  · After just 20 minutes, your blood pressure and heart rate go down. That means there's less stress on your heart and blood vessels. · Within 12 hours, the level of carbon monoxide in your blood drops back to normal. That makes room for more oxygen. With more oxygen in your body, you may notice that you have more energy than when you smoked. After 2 weeks  · Your lungs start to work better. · Your risk of heart attack starts to drop.   After 1 month  · When your lungs are clear, you cough less and breathe deeper, so it's easier to be active. · Your sense of taste and smell return. That means you can enjoy food more than you have since you started smoking. Over the years  · After 1 year, your risk of heart disease is half what it would be if you kept smoking. · After 5 years, your risk of stroke starts to shrink. Within a few years after that, it's about the same as if you'd never smoked. · After 10 years, your risk of dying from lung cancer is cut by about half. And your risk for many other types of cancer is lower too. How would quitting help others in your life? When you quit smoking, you improve the health of everyone who now breathes in your smoke. · Their heart, lung, and cancer risks drop, much like yours. · They are sick less. For babies and small children, living smoke-free means they're less likely to have ear infections, pneumonia, and bronchitis. · If you're a woman who is or will be pregnant someday, quitting smoking means a healthier . · Children who are close to you are less likely to become adult smokers. Where can you learn more? Go to http://drew-tarun.info/. Enter [id number] [id number] [id number] in the search box to learn more about \"Learning About Benefits From Quitting Smoking. \"  Current as of: 2017  Content Version: 11.3  © 6057-9294 [employer]. Care instructions adapted under license by [employer] (which disclaims liability or warranty for this information). If you have questions about a medical condition or this instruction, always ask your healthcare professional. [place] [address] any warranty or liability for your use of this information.

## 2017-07-24 NOTE — PROGRESS NOTES
[facility]  [facility], [address] [place] [address] Avenue  [phone] office/[phone] fax      7/24/2017    Reason for visit:   Chief Complaint   Patient presents with    Results       Patient: Cinthya Shaikh, 1955, [id number]       Primary MD: Corey Peterson NP    Subjective:   Cinthya Shaikh, a 63 y.o. female, who presents for Results      HPI   Cardiovascular Disease Follow up: The patient has hypertension. Diet and Lifestyle: generally follows a low fat low cholesterol diet  Home BP Monitoring: is not measured at home. Pertinent ROS: taking medications as instructed, no medication side effects noted, no TIA's, no chest pain on exertion, no dyspnea on exertion, no swelling of ankles.          Past Medical History:   Diagnosis Date    Arthritis of knee, left 2007    Erythrasma May 2014    beba feet, lower left leg    Glaucoma     H/O mammogram 03/01/2016    normal, f/u in 1 year    Hepatitis C 2002    genotype 1a, previous IV drug user, declined interferons     Hypertension 2002    Tobacco use     working with [facility] Pulmonology smoking cessation        Past Surgical History:   Procedure Laterality Date    HX OTHER SURGICAL  2002    liver biopsy       Social History     Social History    Marital status:      Spouse name: N/A    Number of children: 2    Years of education: N/A     Occupational History     [employer] [address] [employer]      Social History Main Topics    Smoking status: Current Every Day Smoker     Packs/day: 0.50     Types: Cigarettes     Start date: 1/12/1974    Smokeless tobacco: Not on file    Alcohol use No    Drug use: Yes      Comment: IV crack cocaine, in remission since 2002    Sexual activity: Yes     Partners: Male     Other Topics Concern     Service No    Blood Transfusions No    Caffeine Concern No    Occupational Exposure No    Hobby Hazards No    Sleep Concern No    Stress Concern No    Weight Concern Yes     want bypass    Special Diet No    Back Care No    Exercise No    Bike Helmet No    Seat Belt No    Self-Exams No     Social History Narrative       Allergies   Allergen Reactions    Compazine [Prochlorperazine] Hives       Current Outpatient Prescriptions on File Prior to Visit   Medication Sig Dispense Refill    ibuprofen (MOTRIN) 800 mg tablet Take 1 Tab by mouth every eight (8) hours as needed for Pain. Indications: Pain 30 Tab 0    VITAMIN D2 50,000 unit capsule TAKE ONE CAPSULE BY MOUTH EVERY 7 DAYS 4 Cap 8    fluticasone-salmeterol (ADVAIR) 100-50 mcg/dose diskus inhaler Take 1 Puff by inhalation every twelve (12) hours. 3 Inhaler 3    albuterol (PROAIR HFA) 90 mcg/actuation inhaler Take 1-2 Puffs by inhalation every four (4) hours as needed for Wheezing. 3 Inhaler 3    olmesartan-hydroCHLOROthiazide (BENICAR HCT) 40-25 mg per tablet Take 1 Tab by mouth daily. Indications: Hypertension 90 Tab 3    dexlansoprazole (DEXILANT) 30 mg capsule Take 1 Cap by mouth daily as needed. Indications: GASTROESOPHAGEAL REFLUX 90 Cap 3    mometasone (NASONEX) 50 mcg/actuation nasal spray 2 Sprays by Both Nostrils route daily. 2 Container 0    verapamil (CALAN) 120 mg tablet Take 1 Tab by mouth two (2) times a day. Indications: Hypertension 60 Tab 5    clobetasol (TEMOVATE) 0.05 % ointment Apply  to affected area two (2) times a day.  travoprost (TRAVATAN Z) 0.004 % ophthalmic solution Administer 1 Drop to both eyes every evening. Indications: OPEN ANGLE GLAUCOMA 5 mL 3    magnesium oxide (MAG-OX) 400 mg tablet Take 1 Tab by mouth daily. 30 Tab 11    ledipasvir-sofosbuvir (HARVONI)  mg tab Take 1 Tab by mouth daily. Indications: CHRONIC HEPATITIS C - GENOTYPE 1 90 Tab 0     No current facility-administered medications on file prior to visit. Review of Systems   Constitutional: Negative. HENT: Negative. Eyes: Negative. Respiratory: Negative. Cardiovascular: Negative. Gastrointestinal: Negative. Negative for abdominal pain. Genitourinary: Negative. Musculoskeletal: Negative. Reports leg cramping. Has not filled magnesium    Skin: Positive for rash (Sees [facility] derm for dermatitis to feet. Was prescribed temovate). Neurological: Negative. Endo/Heme/Allergies: Negative. Psychiatric/Behavioral: Negative. Objective:   Visit Vitals    /87 (BP 1 Location: Left arm, BP Patient Position: Sitting)    Pulse 94    Temp 98.1 °F (36.7 °C) (Oral)    Resp 16    Ht 5' 7\" (1.702 m)    Wt 229 lb 3.2 oz (104 kg)    SpO2 97%    BMI 35.9 kg/m2      Wt Readings from Last 3 Encounters:   07/24/17 229 lb 3.2 oz (104 kg)   06/22/17 234 lb (106.1 kg)   01/24/17 232 lb 12.8 oz (105.6 kg)     Lab Results   Component Value Date/Time    Glucose 85 07/19/2017 08:16 AM         Physical Exam   Constitutional: She is oriented to person, place, and time. She appears well-developed and well-nourished. HENT:   Head: Normocephalic. Eyes: Conjunctivae are normal. Pupils are equal, round, and reactive to light. Scleral icterus is present. Neck: Normal range of motion. Neck supple. No JVD present. Carotid bruit is not present. Cardiovascular: Normal rate, regular rhythm, normal heart sounds and intact distal pulses. No murmur heard. Pulmonary/Chest: Effort normal and breath sounds normal. No respiratory distress. Abdominal: Soft. Bowel sounds are normal.   Musculoskeletal: Normal range of motion. Neurological: She is alert and oriented to person, place, and time. She has normal reflexes. Skin: Skin is warm and dry. Psychiatric: She has a normal mood and affect. Her behavior is normal.   Vitals reviewed. Assessment:    Tonya Burks who has risk factors including (see above previous medical hx) and:       ICD-10-CM ICD-9-CM    1. Essential hypertension I10 401.9    2.  Chronic hepatitis C without hepatic coma (HCC) B18.2 070.54    3. Gastroesophageal reflux disease without esophagitis K21.9 530.81    4. Tobacco use Z72.0 305.1    5. Colon cancer screening Z12.11 V76.51 OCCULT BLOOD, IMMUNOASSAY (FIT)     1. Essential hypertension  *-The current medical regimen is effective;  continue present plan and medications. 2. Chronic hepatitis C without hepatic coma (HCC)  -Hepatitis C is a liver infection caused by the Hepatitis C virus (HCV). Hepatitis C is a blood-borne virus. Today, most people become infected with the Hepatitis C virus by sharing needles. For some people, hepatitis C is a short-term illness but for 70%-85% of people who become infected with Hepatitis C, it becomes a long-term, chronic infection. Chronic Hepatitis C is a serious disease than can result in long-term health problems, even death. The majority of infected persons might not be aware of their infection because they are not clinically ill. There is no vaccine for Hepatitis C. Discussed with pt that in order to receive Harvoni therapy they must: obtain all Hep A and B vaccines from the health dept ($75), MUST stop all alcohol and heroin. Pt was notified that random tests will be done for illicit drugs and alcohol. If at anytime (after the initial UDS) the pt is positive, therapy will not be initiated despite pt obtaining needed vaccines and if pt is already on therapy, therapy will be stopped and not resumed as these cause damage to the liver. 3. Gastroesophageal reflux disease without esophagitis  -The current medical regimen is effective;  continue present plan and medications. 4. Tobacco use  -Counseled patient on the dangers of tobacco use, and was advised to quit. Reviewed strategies to maximize success, including the use of Chantix.   Discussed the risks of continued tobacco use such as elevated blood pressure, vascular irritation with increased incidence of CVD with stroke or MI and PVD causing claudication, lung damage that could lead to COPD, cancer and death. Encouraged an approach to find a few healthy habits, write them down then plan to decrease their cigarette use by one each week till they are gone all together and to plan for stress that may cause them to want to restart and how to prevent it by having new coping mechanisms in place. 1-800-QUIT-NOW provided for counseling       5. Colon cancer screening  - OCCULT BLOOD, IMMUNOASSAY (FIT); Future      Written instructions followed our verbal discussion of all information discussed above, pending tests ordered and future goals/plans. Patient expressed understanding of current diagnosis, planned testing, follow up and if needed to contact the office for any questions or concerns prior to the next visit. Plan:   Med reconciliation completed with patient. Reviewed side effects of medications with the patient. Questions were answered and patient verb understanding. Discussed lab results with patient    Displays most recent values of common labs: H&H, WBC, Platelets, ALT, AST, BUN, Creat, Na, K, TSH, HgbA1c, Lipids, INR and/or PSA. For additional labs, please use Results Review or Flowsheets.     Lab Results   Component Value Date/Time    WBC 5.0 10/13/2016 10:10 AM    HGB 13.8 10/13/2016 10:10 AM    HCT 40.7 10/13/2016 10:10 AM    PLATELET 632 35/08/8340 10:10 AM       Lab Results   Component Value Date/Time    ALT (SGPT) 173 07/19/2017 08:16 AM    AST (SGOT) 179 07/19/2017 08:16 AM    Creatinine 0.72 07/19/2017 08:16 AM    BUN 14 07/19/2017 08:16 AM    Sodium 140 07/19/2017 08:16 AM    Potassium 3.9 07/19/2017 08:16 AM       Lab Results   Component Value Date/Time    Cholesterol, total 110 01/16/2017 09:37 AM    HDL Cholesterol 35 01/16/2017 09:37 AM    LDL, calculated 57.4 01/16/2017 09:37 AM    Triglyceride 88 01/16/2017 09:37 AM    TSH 0.75 03/24/2015 10:00 AM    Hemoglobin A1c 4.9 03/24/2015 10:05 AM    INR 1.0 03/24/2015 10:00 AM       No results found for: PSAPOC, PSA3, PSAFLT, PSALT, PSA, PSA2, PSAR1    Orders Placed This Encounter    OCCULT BLOOD, IMMUNOASSAY (FIT)     Standing Status:   Future     Standing Expiration Date:   7/25/2018     Current Outpatient Prescriptions   Medication Sig Dispense Refill    ibuprofen (MOTRIN) 800 mg tablet Take 1 Tab by mouth every eight (8) hours as needed for Pain. Indications: Pain 30 Tab 0    VITAMIN D2 50,000 unit capsule TAKE ONE CAPSULE BY MOUTH EVERY 7 DAYS 4 Cap 8    fluticasone-salmeterol (ADVAIR) 100-50 mcg/dose diskus inhaler Take 1 Puff by inhalation every twelve (12) hours. 3 Inhaler 3    albuterol (PROAIR HFA) 90 mcg/actuation inhaler Take 1-2 Puffs by inhalation every four (4) hours as needed for Wheezing. 3 Inhaler 3    olmesartan-hydroCHLOROthiazide (BENICAR HCT) 40-25 mg per tablet Take 1 Tab by mouth daily. Indications: Hypertension 90 Tab 3    dexlansoprazole (DEXILANT) 30 mg capsule Take 1 Cap by mouth daily as needed. Indications: GASTROESOPHAGEAL REFLUX 90 Cap 3    mometasone (NASONEX) 50 mcg/actuation nasal spray 2 Sprays by Both Nostrils route daily. 2 Container 0    verapamil (CALAN) 120 mg tablet Take 1 Tab by mouth two (2) times a day. Indications: Hypertension 60 Tab 5    clobetasol (TEMOVATE) 0.05 % ointment Apply  to affected area two (2) times a day.  travoprost (TRAVATAN Z) 0.004 % ophthalmic solution Administer 1 Drop to both eyes every evening. Indications: OPEN ANGLE GLAUCOMA 5 mL 3    magnesium oxide (MAG-OX) 400 mg tablet Take 1 Tab by mouth daily. 30 Tab 11    ledipasvir-sofosbuvir (HARVONI)  mg tab Take 1 Tab by mouth daily. Indications: CHRONIC HEPATITIS C - GENOTYPE 1 90 Tab 0     Medications Discontinued During This Encounter   Medication Reason    clindamycin (CLEOCIN T) 1 % topical gel Clinically Ineffective       Follow-up Disposition:  Return in about 6 months (around 1/24/2018), or if symptoms worsen or fail to improve. Labs needed for follow-up appt      Manda Johnson FNP-C  [address] I spent 35 minutes with the patient in face-to-face consultation, of which greater than 50% was spent in counseling and coordination of care as described above.

## 2017-07-25 ENCOUNTER — TELEPHONE (OUTPATIENT)
Dept: FAMILY MEDICINE CLINIC | Age: 62
End: 2017-07-25

## 2017-07-26 DIAGNOSIS — R19.5 POSITIVE FIT (FECAL IMMUNOCHEMICAL TEST): Primary | ICD-10-CM

## 2017-07-26 LAB — HEMOCCULT STL QL IA: POSITIVE

## 2017-07-26 NOTE — PROGRESS NOTES
Krishna Rodas, please notify the patient that her FIT screening came back positive. Will need to refer her to GI specilists to proceed with colonoscopy.

## 2017-07-26 NOTE — TELEPHONE ENCOUNTER
Medication: Nasonex, dose: 2 sprays both nostrils, how often: qd, current number of medication days provided: 90, refill per application. Lot #: [id number], EXP.02/18. This medication was received and verified for the following 1. Correct Patient, 2. Correct Diagnosis, 3. Correct Drug, 4. Correct route, and no current allergy to medication. Please contact patient to come  their medications.      Naida Nicolas, MSN, RN, [facility]     [facility]

## 2017-07-27 NOTE — PROGRESS NOTES
Called patient and informed her that FIT was positive.  Patient voiced understanding that she will be called with appointment to see Colon & Rectal specialist.

## 2017-07-28 NOTE — PROGRESS NOTES
Called patient and informed her that she is scheduled for initial nurse consult at [address] [place] [address] on 8/24/17 at 1030, check in at 10 am with photo ID and med list.  Address and phone number given. Patient voiced understanding. Letter also sent with details of appointment.

## 2017-08-01 ENCOUNTER — TELEPHONE (OUTPATIENT)
Dept: FAMILY MEDICINE CLINIC | Age: 62
End: 2017-08-01

## 2017-08-01 NOTE — TELEPHONE ENCOUNTER
Medication: Benicar HCT 40/25 mg, dose: 1 tab, how often: daily, current number of medication days provided: 90, refill per application. Lot # [id number], EXP.05/19. This medication was received and verified for the following 1. Correct Patient, 2. Correct Diagnosis, 3. Correct Drug, 4. Correct route, and no current allergy to medication. Please contact patient to come  their medications.      Cale Centeno, MSN, RN, [facility]

## 2017-08-02 ENCOUNTER — TELEPHONE (OUTPATIENT)
Dept: FAMILY MEDICINE CLINIC | Age: 62
End: 2017-08-02

## 2017-08-03 NOTE — TELEPHONE ENCOUNTER
Medication: Dexilant 30 mg  , dose: 1 tab, how often: every day as needed , current number of medication days provided: 90, refill per application. Lot #: [id number], EXP.07/18. This medication was received and verified for the following 1. Correct Patient, 2. Correct Diagnosis, 3. Correct Drug, 4. Correct route, and no current allergy to medication. Please contact patient to come  their medications.      Leny Manzano MSN, RN, P-C     [facility]

## 2017-08-24 ENCOUNTER — OFFICE VISIT (OUTPATIENT)
Dept: SURGERY | Age: 62
End: 2017-08-24

## 2017-08-24 VITALS
HEART RATE: 82 BPM | BODY MASS INDEX: 36.41 KG/M2 | RESPIRATION RATE: 16 BRPM | OXYGEN SATURATION: 96 % | WEIGHT: 232 LBS | HEIGHT: 67 IN | TEMPERATURE: 98 F

## 2017-08-24 DIAGNOSIS — Z12.11 COLON CANCER SCREENING: Primary | ICD-10-CM

## 2017-08-24 NOTE — PROGRESS NOTES
Review of Systems   Constitutional: Positive for malaise/fatigue. Negative for chills, diaphoresis, fever and weight loss. HENT: Negative. Eyes: Positive for pain. Negative for blurred vision, double vision, photophobia and discharge. Glaucoma   Respiratory: Positive for cough and sputum production. Negative for hemoptysis, shortness of breath and wheezing. Cardiovascular: Positive for chest pain. Negative for palpitations, orthopnea, claudication, leg swelling and PND. Gastrointestinal: Positive for heartburn. Negative for abdominal pain, blood in stool, constipation, diarrhea, melena, nausea and vomiting. Genitourinary: Negative. Musculoskeletal: Positive for joint pain. Negative for back pain, falls, myalgias and neck pain. Left wrist   Skin: Positive for rash. Negative for itching. Lower leg chronic dermatitis   Neurological: Positive for tingling. Negative for dizziness, tremors, sensory change, speech change, focal weakness, seizures, loss of consciousness, weakness and headaches. Left arm   Endo/Heme/Allergies: Negative. Psychiatric/Behavioral: Negative for depression, hallucinations, memory loss, substance abuse and suicidal ideas. The patient is nervous/anxious. The patient does not have insomnia. Colon Screen    Patient: Reji Bearden MRN: [id number]  SSN: [id number]    YOB: 1955  Age: 63 y.o. Sex: female        Subjective:   Reji Bearden was referred by her PCP, Jigna Hugo NP due to a positive FIT test.  Patient referred for colonoscopy for   Screening colonoscopy. Patient denies rectal pain or bleeding. Abdominal surgeries as described below, specifically cholecystectomy. Family history as described below, specifically maternal aunt and 2 sisters with colon polyps. Last colonoscopy was 5 years ago in [place], patient states she had polyps and was recalled for 5 years.     Allergies   Allergen Reactions    Compazine [Prochlorperazine] Hives       Past Medical History:   Diagnosis Date    Arthritis of knee, left 2007    Chronic obstructive pulmonary disease ([facility] [address]     mild    Erythrasma May 2014    beba feet, lower left leg    Glaucoma     H/O mammogram 03/01/2016    normal, f/u in 1 year    Hepatitis C 2002    genotype 1a, previous IV drug user, declined interferons     Hypertension 2002    Positive FIT (fecal immunochemical test) 07/26/2017    Tobacco use     working with [facility] smoking cessation      Past Surgical History:   Procedure Laterality Date    HX CHOLECYSTECTOMY  2006    HX COLONOSCOPY  2012    hx of polyps, [address] [facility] OTHER SURGICAL  2002    liver biopsy      Family History   Problem Relation Age of Onset    Hypertension Mother     COPD Mother     Other Mother      Poly? nervous system    Cancer Father      rare form bone     Colon Polyps Sister     Colon Polyps Maternal Aunt      Social History   Substance Use Topics    Smoking status: Current Every Day Smoker     Packs/day: 0.50     Types: Cigarettes     Start date: 1/12/1974    Smokeless tobacco: Never Used    Alcohol use No      Prior to Admission medications    Medication Sig Start Date End Date Taking? Authorizing Provider   ibuprofen (MOTRIN) 800 mg tablet Take 1 Tab by mouth every eight (8) hours as needed for Pain. Indications: Pain 6/22/17  Yes Negar Lucas NP   VITAMIN D2 50,000 unit capsule TAKE ONE CAPSULE BY MOUTH EVERY 7 DAYS 4/23/17  Yes Jacob Newby NP   fluticasone-salmeterol (ADVAIR) 100-50 mcg/dose diskus inhaler Take 1 Puff by inhalation every twelve (12) hours. 1/24/17  Yes Negar Lucas NP   albuterol (PROAIR HFA) 90 mcg/actuation inhaler Take 1-2 Puffs by inhalation every four (4) hours as needed for Wheezing. 1/24/17  Yes Negar Lucas NP   olmesartan-hydroCHLOROthiazide (BENICAR HCT) 40-25 mg per tablet Take 1 Tab by mouth daily.  Indications: Hypertension 1/24/17 Yes Steven Leonel, NP   dexlansoprazole (DEXILANT) 30 mg capsule Take 1 Cap by mouth daily as needed. Indications: GASTROESOPHAGEAL REFLUX 1/24/17  Yes Steven Castaneda, NP   mometasone (NASONEX) 50 mcg/actuation nasal spray 2 Sprays by Both Nostrils route daily. 1/9/17  Yes Steven Castaneda, NP   verapamil (CALAN) 120 mg tablet Take 1 Tab by mouth two (2) times a day. Indications: Hypertension 10/24/16  Yes Steven Castaneda, NP   clobetasol (TEMOVATE) 0.05 % ointment Apply  to affected area two (2) times a day. Yes Historical Provider   magnesium oxide (MAG-OX) 400 mg tablet Take 1 Tab by mouth daily. 8/4/16  Yes Nikki Whiteside NP   travoprost (TRAVATAN Z) 0.004 % ophthalmic solution Administer 1 Drop to both eyes every evening. Indications: OPEN ANGLE GLAUCOMA 11/18/15  Yes Tabitha Gan NP   ledipasvir-sofosbuvir (HARVONI)  mg tab Take 1 Tab by mouth daily. Indications: CHRONIC HEPATITIS C - GENOTYPE 1 4/19/16   Tabitha Gan NP          Review of Systems:    Risks colonoscopy described- colon injury, missed lesion, anesthesia problems, bleeding       Tamara Jimenez, LPN  August 24, 6235  10:50 AM

## 2017-09-19 ENCOUNTER — TELEPHONE (OUTPATIENT)
Dept: FAMILY MEDICINE CLINIC | Age: 62
End: 2017-09-19

## 2017-09-19 ENCOUNTER — OFFICE VISIT (OUTPATIENT)
Dept: FAMILY MEDICINE CLINIC | Age: 62
End: 2017-09-19

## 2017-09-19 VITALS
BODY MASS INDEX: 36.79 KG/M2 | HEART RATE: 99 BPM | SYSTOLIC BLOOD PRESSURE: 125 MMHG | TEMPERATURE: 98.2 F | OXYGEN SATURATION: 97 % | RESPIRATION RATE: 20 BRPM | DIASTOLIC BLOOD PRESSURE: 77 MMHG | HEIGHT: 67 IN | WEIGHT: 234.4 LBS

## 2017-09-19 DIAGNOSIS — M25.532 LEFT WRIST PAIN: Primary | ICD-10-CM

## 2017-09-19 RX ORDER — PREDNISONE 20 MG/1
40 TABLET ORAL DAILY
Qty: 10 TAB | Refills: 0 | Status: SHIPPED | OUTPATIENT
Start: 2017-09-19 | End: 2017-09-24

## 2017-09-19 NOTE — MR AVS SNAPSHOT
Visit Information Date & Time Provider Department Dept. Phone Encounter #  
 9/19/2017 10:00 AM Jil La NP [address] [facility] [address] [id number] Follow-up Instructions Return if symptoms worsen or fail to improve. Your Appointments 9/19/2017 10:00 AM  
SICK VISIT with Jil La NP [address] [facility] ([address]) Appt Note: Left hand pain [address] [facility] [id number]  
[address] [facility] [phone]  
  
    
 12/19/2017 11:00 AM  
Follow Up with Jil La NP [address] [facility] ([address]) Appt Note: 6 mt follow up  
 [address] [facility] [id number]  
[address] [facility] [phone] Upcoming Health Maintenance Date Due DTaP/Tdap/Td series (1 - Tdap) 12/14/1976 INFLUENZA AGE 9 TO ADULT 8/1/2017 PAP AKA CERVICAL CYTOLOGY 1/6/2018 Pneumococcal 19-64 Medium Risk (1 of 1 - PPSV23) 1/24/2018* FOBT Q 1 YEAR AGE 50-75 7/23/2018 BREAST CANCER SCRN MAMMOGRAM 3/7/2019 *Topic was postponed. The date shown is not the original due date. Allergies as of 9/19/2017  Review Complete On: 9/19/2017 By: Marce Brock Severity Noted Reaction Type Reactions Compazine [Prochlorperazine]  06/07/2013    Hives Current Immunizations  Reviewed on 11/18/2015 Name Date Influenza Vaccine (Quad) PF 10/17/2016, 11/18/2015 Influenza Vaccine PF 10/31/2013 Not reviewed this visit You Were Diagnosed With   
  
 Codes Comments Left wrist pain    -  Primary ICD-10-CM: X12.906 ICD-9-CM: 719.43 Vitals BP Pulse Temp Resp Height(growth percentile) Weight(growth percentile) 125/77 99 98.2 °F (36.8 °C) 20 5' 7\" (1.702 m) 234 lb 6.4 oz (106.3 kg) SpO2 BMI OB Status Smoking Status 97% 36.71 kg/m2 Postmenopausal Current Every Day Smoker Vitals History BMI and BSA Data Body Mass Index Body Surface Area  
 36.71 kg/m 2 2.24 m 2 Preferred Pharmacy Pharmacy Name Phone SARAN [facility]EV[facility] [address] [facility] [phone] Your Updated Medication List  
  
   
This list is accurate as of: 9/19/17  9:52 AM.  Always use your most recent med list.  
  
  
  
  
 albuterol 90 mcg/actuation inhaler Commonly known as:  PROAIR HFA Take 1-2 Puffs by inhalation every four (4) hours as needed for Wheezing. clobetasol 0.05 % ointment Commonly known as:  Mackenzie Stands Apply  to affected area two (2) times a day. dexlansoprazole 30 mg capsule Commonly known as:  DEXILANT Take 1 Cap by mouth daily as needed. Indications: GASTROESOPHAGEAL REFLUX  
  
 fluticasone-salmeterol 100-50 mcg/dose diskus inhaler Commonly known as:  ADVAIR Take 1 Puff by inhalation every twelve (12) hours. ibuprofen 800 mg tablet Commonly known as:  MOTRIN Take 1 Tab by mouth every eight (8) hours as needed for Pain. Indications: Pain  
  
 ledipasvir-sofosbuvir  mg Tab Commonly known as:  Irma Amy Take 1 Tab by mouth daily. Indications: CHRONIC HEPATITIS C - GENOTYPE 1  
  
 magnesium oxide 400 mg tablet Commonly known as:  MAG-OX Take 1 Tab by mouth daily. mometasone 50 mcg/actuation nasal spray Commonly known as:  NASONEX  
2 Sprays by Both Nostrils route daily. olmesartan-hydroCHLOROthiazide 40-25 mg per tablet Commonly known as:  BENICAR HCT Take 1 Tab by mouth daily. Indications: Hypertension  
  
 predniSONE 20 mg tablet Commonly known as:  Pansy Hummingbird Take 2 Tabs by mouth daily for 5 days. With Breakfast  
  
 travoprost 0.004 % ophthalmic solution Commonly known as:  TRAVATAN Z Administer 1 Drop to both eyes every evening. Indications: OPEN ANGLE GLAUCOMA  
  
 verapamil 120 mg tablet Commonly known as:  CALAN Take 1 Tab by mouth two (2) times a day. Indications: Hypertension VITAMIN D2 50,000 unit capsule Generic drug:  ergocalciferol TAKE ONE CAPSULE BY MOUTH EVERY 7 DAYS Prescriptions Sent to Pharmacy Refills  
 predniSONE (DELTASONE) 20 mg tablet 0 Sig: Take 2 Tabs by mouth daily for 5 days. With Breakfast  
 Class: Normal  
 Pharmacy: [address] #: [phone] Route: Oral  
  
We Performed the Following AMB SUPPLY ORDER [[id number] Custom] Comments: The patient to Purchase OTC hand Splint for Left wrist pain REFERRAL TO ORTHOPEDICS [[id number] Custom] Comments:  
 Refer for possible carpal tunnel Follow-up Instructions Return if symptoms worsen or fail to improve. To-Do List   
 09/19/2017 Neurology:  EMG ONE EXTREMITY UPPER LT   
  
 09/19/2017 Imaging:  XR WRIST LT AP/LAT/OBL MIN 3V Referral Information Referral ID Referred By Referred To  
  
 [id number] Blessing CARRERA Not Available Visits Status Start Date End Date 1 New Request 9/19/17 9/19/18 If your referral has a status of pending review or denied, additional information will be sent to support the outcome of this decision. Patient Instructions Carpal Tunnel Syndrome: Care Instructions Your Care Instructions Carpal tunnel syndrome is a nerve problem. It can cause tingling, numbness, weakness, or pain in the fingers, thumb, and hand. The median nerve and several tough tissues called tendons run through a space in the wrist called the carpal tunnel. The repeated hand motions used in work and some hobbies and sports can put pressure on the nerve. Pregnancy and several conditions, including diabetes, arthritis, and an underactive thyroid, also can cause carpal tunnel syndrome.  
You may be able to limit an activity or do it differently to reduce your symptoms. You also can take other steps to feel better. If your symptoms are mild, 1 to 2 weeks of home treatment are likely to ease your pain. Surgery is needed only if other treatments do not work. Follow-up care is a key part of your treatment and safety. Be sure to make and go to all appointments, and call your doctor if you are having problems. It's also a good idea to know your test results and keep a list of the medicines you take. How can you care for yourself at home? · If possible, stop or reduce the activity that causes your symptoms. If you cannot stop the activity, take frequent breaks to rest and stretch or change hand positions to do a task. Try switching hands, such as when using a computer mouse. · Try to avoid bending or twisting your wrists. · Ask your doctor if you can take an over-the-counter pain medicine, such as acetaminophen (Tylenol), ibuprofen (Advil, Motrin), or naproxen (Aleve). Be safe with medicines. Read and follow all instructions on the label. · If your doctor prescribes corticosteroid medicine to help reduce pain and swelling, take it exactly as prescribed. Call your doctor if you think you are having a problem with your medicine. · Put ice or a cold pack on your wrist for 10 to 20 minutes at a time to ease pain. Put a thin cloth between the ice and your skin. · If your doctor or your physical or occupational therapist tells you to wear a wrist splint, wear it as directed to keep your wrist in a neutral position. This also eases pressure on your median nerve. · Ask your doctor whether you should have physical or occupational therapy to learn how to do tasks differently. · Try a yoga class to stretch your muscles and build strength in your hands and wrists. Yoga has been shown to ease carpal tunnel symptoms. To prevent carpal tunnel · When working at a computer, keep your hands and wrists in line with your forearms. Hold your elbows close to your sides. Take a break every 10 to 15 minutes. · Try these exercises: ¨ Warm up: Rotate your wrist up, down, and from side to side. Repeat this 4 times. Stretch your fingers far apart, relax them, then stretch them again. Repeat 4 times. Stretch your thumb by pulling it back gently, holding it, and then releasing it. Repeat 4 times. ¨ Prayer stretch: Start with your palms together in front of your chest just below your chin. Slowly lower your hands toward your waistline while keeping your hands close to your stomach and your palms together until you feel a mild to moderate stretch under your forearms. Hold for 10 to 20 seconds. Repeat 4 times. ¨ Wrist flexor stretch: Hold your arm in front of you with your palm up. Bend your wrist, pointing your hand toward the floor. With your other hand, gently bend your wrist further until you feel a mild to moderate stretch in your forearm. Hold for 10 to 20 seconds. Repeat 4 times. ¨ Wrist extensor stretch: Repeat the steps for the wrist flexor stretch, but begin with your extended hand palm down. · Squeeze a rubber exercise ball several times a day to keep your hands and fingers strong. · Avoid holding objects (such as a book) in one position for a long time. When possible, use your whole hand to grasp an object. Using just the thumb and index finger can put stress on the wrist. 
· Do not smoke. It can make this condition worse by reducing blood flow to the median nerve. If you need help quitting, talk to your doctor about stop-smoking programs and medicines. These can increase your chances of quitting for good. When should you call for help? Watch closely for changes in your health, and be sure to contact your doctor if: 
· Your pain or other problems do not get better with home care. · You want more information about physical or occupational therapy. · You have side effects of your corticosteroid medicine, such as: ¨ Weight gain. ¨ Mood changes. ¨ Trouble sleeping. ¨ Bruising easily. · You have any other problems with your medicine. Where can you learn more? Go to http://drew-tarun.info/. Enter R432 in the search box to learn more about \"Carpal Tunnel Syndrome: Care Instructions. \" Current as of: March 21, 2017 Content Version: 11.3 © 2294-8212 [employer]. Care instructions adapted under license by [employer] (which disclaims liability or warranty for this information). If you have questions about a medical condition or this instruction, always ask your healthcare professional. [place] [address] any warranty or liability for your use of this information. Carpal Tunnel Syndrome: Exercises Your Care Instructions Here are some examples of typical rehabilitation exercises for your condition. Start each exercise slowly. Ease off the exercise if you start to have pain. Your doctor or your physical or occupational therapist will tell you when you can start these exercises and which ones will work best for you. How to do the exercises Note: When you no longer have pain or numbness, you can do exercises to help prevent carpal tunnel syndrome from coming back. Do not do any stretch or movement that is uncomfortable or painful. Warm-up stretches 1. Rotate your wrist up, down, and from side to side. Repeat 4 times. 2. Stretch your fingers far apart. Relax them, and then stretch them again. Repeat 4 times. 3. Stretch your thumb by pulling it back gently, holding it, and then releasing it. Repeat 4 times. Prayer stretch 1. Start with your palms together in front of your chest just below your chin. 2. Slowly lower your hands toward your waistline, keeping your hands close to your stomach and your palms together until you feel a mild to moderate stretch under your forearms. 3. Hold for at least 15 to 30 seconds. Repeat 2 to 4 times. Wrist flexor stretch 1. Extend your arm in front of you with your palm up. 2. Bend your wrist, pointing your hand toward the floor. 3. With your other hand, gently bend your wrist farther until you feel a mild to moderate stretch in your forearm. 4. Hold for at least 15 to 30 seconds. Repeat 2 to 4 times. Wrist extensor stretch Repeat steps 1 through 4 of the stretch above, but begin with your extended hand palm down. Follow-up care is a key part of your treatment and safety. Be sure to make and go to all appointments, and call your doctor if you are having problems. It's also a good idea to know your test results and keep a list of the medicines you take. Where can you learn more? Go to http://drew-tarun.info/. Enter Y890 in the search box to learn more about \"Carpal Tunnel Syndrome: Exercises. \" Current as of: March 21, 2017 Content Version: 11.3 © 7850-7965 Price Interactive. Care instructions adapted under license by [employer] (which disclaims liability or warranty for this information). If you have questions about a medical condition or this instruction, always ask your healthcare professional. [address] any warranty or liability for your use of this information. Introducing [facility]! Dear Adalgisa Carter: Thank you for requesting a goBalto account. Our records indicate that you already have an active goBalto account. You can access your account anytime at [email or web address] Did you know that you can access your hospital and ER discharge instructions at any time in goBalto? You can also review all of your test results from your hospital stay or ER visit. Additional Information If you have questions, please visit the Frequently Asked Questions section of the goBalto website at [email or web address] Remember, goBalto is NOT to be used for urgent needs.  For medical emergencies, dial 911. Now available from your iPhone and Android! Please provide this summary of care documentation to your next provider. Your primary care clinician is listed as Dorys Mems. If you have any questions after today's visit, please call [phone].

## 2017-09-19 NOTE — PATIENT INSTRUCTIONS
Carpal Tunnel Syndrome: Care Instructions  Your Care Instructions    Carpal tunnel syndrome is a nerve problem. It can cause tingling, numbness, weakness, or pain in the fingers, thumb, and hand. The median nerve and several tough tissues called tendons run through a space in the wrist called the carpal tunnel. The repeated hand motions used in work and some hobbies and sports can put pressure on the nerve. Pregnancy and several conditions, including diabetes, arthritis, and an underactive thyroid, also can cause carpal tunnel syndrome. You may be able to limit an activity or do it differently to reduce your symptoms. You also can take other steps to feel better. If your symptoms are mild, 1 to 2 weeks of home treatment are likely to ease your pain. Surgery is needed only if other treatments do not work. Follow-up care is a key part of your treatment and safety. Be sure to make and go to all appointments, and call your doctor if you are having problems. It's also a good idea to know your test results and keep a list of the medicines you take. How can you care for yourself at home? · If possible, stop or reduce the activity that causes your symptoms. If you cannot stop the activity, take frequent breaks to rest and stretch or change hand positions to do a task. Try switching hands, such as when using a computer mouse. · Try to avoid bending or twisting your wrists. · Ask your doctor if you can take an over-the-counter pain medicine, such as acetaminophen (Tylenol), ibuprofen (Advil, Motrin), or naproxen (Aleve). Be safe with medicines. Read and follow all instructions on the label. · If your doctor prescribes corticosteroid medicine to help reduce pain and swelling, take it exactly as prescribed. Call your doctor if you think you are having a problem with your medicine. · Put ice or a cold pack on your wrist for 10 to 20 minutes at a time to ease pain.  Put a thin cloth between the ice and your skin.  · If your doctor or your physical or occupational therapist tells you to wear a wrist splint, wear it as directed to keep your wrist in a neutral position. This also eases pressure on your median nerve. · Ask your doctor whether you should have physical or occupational therapy to learn how to do tasks differently. · Try a yoga class to stretch your muscles and build strength in your hands and wrists. Yoga has been shown to ease carpal tunnel symptoms. To prevent carpal tunnel  · When working at a computer, keep your hands and wrists in line with your forearms. Hold your elbows close to your sides. Take a break every 10 to 15 minutes. · Try these exercises:  ¨ Warm up: Rotate your wrist up, down, and from side to side. Repeat this 4 times. Stretch your fingers far apart, relax them, then stretch them again. Repeat 4 times. Stretch your thumb by pulling it back gently, holding it, and then releasing it. Repeat 4 times. ¨ Prayer stretch: Start with your palms together in front of your chest just below your chin. Slowly lower your hands toward your waistline while keeping your hands close to your stomach and your palms together until you feel a mild to moderate stretch under your forearms. Hold for 10 to 20 seconds. Repeat 4 times. ¨ Wrist flexor stretch: Hold your arm in front of you with your palm up. Bend your wrist, pointing your hand toward the floor. With your other hand, gently bend your wrist further until you feel a mild to moderate stretch in your forearm. Hold for 10 to 20 seconds. Repeat 4 times. ¨ Wrist extensor stretch: Repeat the steps for the wrist flexor stretch, but begin with your extended hand palm down. · Squeeze a rubber exercise ball several times a day to keep your hands and fingers strong. · Avoid holding objects (such as a book) in one position for a long time. When possible, use your whole hand to grasp an object.  Using just the thumb and index finger can put stress on the wrist.  · Do not smoke. It can make this condition worse by reducing blood flow to the median nerve. If you need help quitting, talk to your doctor about stop-smoking programs and medicines. These can increase your chances of quitting for good. When should you call for help? Watch closely for changes in your health, and be sure to contact your doctor if:  · Your pain or other problems do not get better with home care. · You want more information about physical or occupational therapy. · You have side effects of your corticosteroid medicine, such as:  ¨ Weight gain. ¨ Mood changes. ¨ Trouble sleeping. ¨ Bruising easily. · You have any other problems with your medicine. Where can you learn more? Go to http://drew-tarun.info/. Enter R432 in the search box to learn more about \"Carpal Tunnel Syndrome: Care Instructions. \"  Current as of: March 21, 2017  Content Version: 11.3  © 5732-6639 [employer]. Care instructions adapted under license by [employer] (which disclaims liability or warranty for this information). If you have questions about a medical condition or this instruction, always ask your healthcare professional. [place] [address] any warranty or liability for your use of this information. Carpal Tunnel Syndrome: Exercises  Your Care Instructions  Here are some examples of typical rehabilitation exercises for your condition. Start each exercise slowly. Ease off the exercise if you start to have pain. Your doctor or your physical or occupational therapist will tell you when you can start these exercises and which ones will work best for you. How to do the exercises  Note: When you no longer have pain or numbness, you can do exercises to help prevent carpal tunnel syndrome from coming back. Do not do any stretch or movement that is uncomfortable or painful. Warm-up stretches  1. Rotate your wrist up, down, and from side to side. Repeat 4 times. 2. Stretch your fingers far apart. Relax them, and then stretch them again. Repeat 4 times. 3. Stretch your thumb by pulling it back gently, holding it, and then releasing it. Repeat 4 times. Prayer stretch    1. Start with your palms together in front of your chest just below your chin. 2. Slowly lower your hands toward your waistline, keeping your hands close to your stomach and your palms together until you feel a mild to moderate stretch under your forearms. 3. Hold for at least 15 to 30 seconds. Repeat 2 to 4 times. Wrist flexor stretch    1. Extend your arm in front of you with your palm up. 2. Bend your wrist, pointing your hand toward the floor. 3. With your other hand, gently bend your wrist farther until you feel a mild to moderate stretch in your forearm. 4. Hold for at least 15 to 30 seconds. Repeat 2 to 4 times. Wrist extensor stretch    Repeat steps 1 through 4 of the stretch above, but begin with your extended hand palm down. Follow-up care is a key part of your treatment and safety. Be sure to make and go to all appointments, and call your doctor if you are having problems. It's also a good idea to know your test results and keep a list of the medicines you take. Where can you learn more? Go to http://drew-tarun.info/. Enter K166 in the search box to learn more about \"Carpal Tunnel Syndrome: Exercises. \"  Current as of: March 21, 2017  Content Version: 11.3  © 9108-2138 [employer]. Care instructions adapted under license by [employer] (which disclaims liability or warranty for this information). If you have questions about a medical condition or this instruction, always ask your healthcare professional. [place] [address] any warranty or liability for your use of this information.

## 2017-09-19 NOTE — PROGRESS NOTES
SULEIMAN THOMPSON [facility]  [address] [facility], [address] [place] [address]  [phone] office/[phone] fax      9/19/2017    Reason for visit:   Chief Complaint   Patient presents with    Hand Pain     left wrist down to hand for a while       Patient: Sheila Desai, 1955, [id number]       Primary MD: Loan Michael NP    Subjective:   Sheila Desai, a 63 y.o. female, who presents for Hand Pain (left wrist down to hand for a while)      Hand Pain    The history is provided by the patient. This is a new (2 months ) problem. The current episode started more than 1 week ago. The problem occurs daily. The problem has been gradually worsening. The pain is present in the left wrist. The quality of the pain is described as aching and pounding. The pain is at a severity of 5/10. The pain is moderate. Associated symptoms include numbness, limited range of motion and tingling. Pertinent negatives include no stiffness, no itching, no back pain and no neck pain. She has tried arthritis medications (NSaid) for the symptoms. The treatment provided mild relief. There has been no history of extremity trauma.        Past Medical History:   Diagnosis Date    Arthritis of knee, left 2007    Chronic obstructive pulmonary disease ([address]     mild    Erythrasma May 2014    beba feet, lower left leg    Glaucoma     H/O mammogram 03/01/2016    normal, f/u in 1 year    Hepatitis C 2002    genotype 1a, previous IV drug user, declined interferons     Hypertension 2002    Positive FIT (fecal immunochemical test) 07/26/2017    Tobacco use     working with [employer] [facility] smoking cessation        Past Surgical History:   Procedure Laterality Date    HX CHOLECYSTECTOMY  2006    HX COLONOSCOPY  2012    hx of polyps, [place]    HX OTHER SURGICAL  2002    liver biopsy       Social History     Social History    Marital status:      Spouse name: N/A    Number of children: 2    Years of education: N/A     Occupational History   [address] [employer]      Social History Main Topics    Smoking status: Current Every Day Smoker     Packs/day: 0.50     Types: Cigarettes     Start date: 1/12/1974    Smokeless tobacco: Never Used    Alcohol use No    Drug use: Yes      Comment: IV crack cocaine, in remission since 2002    Sexual activity: Yes     Partners: Male     Other Topics Concern     Service No    Blood Transfusions No    Caffeine Concern No    Occupational Exposure No    Hobby Hazards No    Sleep Concern No    Stress Concern No    Weight Concern Yes     want bypass    Special Diet No    Back Care No    Exercise No    Bike Helmet No    Seat Belt No    Self-Exams No     Social History Narrative       Allergies   Allergen Reactions    Compazine [Prochlorperazine] Hives       Current Outpatient Prescriptions on File Prior to Visit   Medication Sig Dispense Refill    ibuprofen (MOTRIN) 800 mg tablet Take 1 Tab by mouth every eight (8) hours as needed for Pain. Indications: Pain 30 Tab 0    VITAMIN D2 50,000 unit capsule TAKE ONE CAPSULE BY MOUTH EVERY 7 DAYS 4 Cap 8    fluticasone-salmeterol (ADVAIR) 100-50 mcg/dose diskus inhaler Take 1 Puff by inhalation every twelve (12) hours. 3 Inhaler 3    albuterol (PROAIR HFA) 90 mcg/actuation inhaler Take 1-2 Puffs by inhalation every four (4) hours as needed for Wheezing. 3 Inhaler 3    olmesartan-hydroCHLOROthiazide (BENICAR HCT) 40-25 mg per tablet Take 1 Tab by mouth daily. Indications: Hypertension 90 Tab 3    dexlansoprazole (DEXILANT) 30 mg capsule Take 1 Cap by mouth daily as needed. Indications: GASTROESOPHAGEAL REFLUX 90 Cap 3    mometasone (NASONEX) 50 mcg/actuation nasal spray 2 Sprays by Both Nostrils route daily. 2 Container 0    verapamil (CALAN) 120 mg tablet Take 1 Tab by mouth two (2) times a day.  Indications: Hypertension 60 Tab 5    clobetasol (TEMOVATE) 0.05 % ointment Apply  to affected area two (2) times a day.  magnesium oxide (MAG-OX) 400 mg tablet Take 1 Tab by mouth daily. 30 Tab 11    travoprost (TRAVATAN Z) 0.004 % ophthalmic solution Administer 1 Drop to both eyes every evening. Indications: OPEN ANGLE GLAUCOMA 5 mL 3    ledipasvir-sofosbuvir (HARVONI)  mg tab Take 1 Tab by mouth daily. Indications: CHRONIC HEPATITIS C - GENOTYPE 1 90 Tab 0     No current facility-administered medications on file prior to visit. Review of Systems   Constitutional: Negative. Musculoskeletal: Positive for joint pain (Left wrist pain). Negative for back pain, neck pain and stiffness. Skin: Negative for itching. Neurological: Positive for tingling and numbness. Objective:   Visit Vitals    /77    Pulse 99    Temp 98.2 °F (36.8 °C)    Resp 20    Ht 5' 7\" (1.702 m)    Wt 234 lb 6.4 oz (106.3 kg)    SpO2 97%    BMI 36.71 kg/m2      Wt Readings from Last 3 Encounters:   09/19/17 234 lb 6.4 oz (106.3 kg)   08/24/17 232 lb (105.2 kg)   07/24/17 229 lb 3.2 oz (104 kg)     Lab Results   Component Value Date/Time    Glucose 85 07/19/2017 08:16 AM         Physical Exam   Constitutional: She appears well-developed. Cardiovascular: Normal rate, regular rhythm and normal heart sounds. Pulmonary/Chest: Effort normal and breath sounds normal. No respiratory distress. Musculoskeletal:        Left wrist: She exhibits decreased range of motion (Pain). Neurological:   Positive phalens and tinels sign. Left hand weakness> R     Vitals reviewed. ICD-10-CM ICD-9-CM    1. Left wrist pain M25.532 719.43 XR WRIST LT AP/LAT/OBL MIN 3V      REFERRAL TO ORTHOPEDICS      predniSONE (DELTASONE) 20 mg tablet      AMB SUPPLY ORDER      CANCELED: EMG ONE EXTREMITY UPPER LT     Diagnoses and all orders for this visit:    1. Left wrist pain  -     XR WRIST LT AP/LAT/OBL MIN 3V; Future  -     REFERRAL TO ORTHOPEDICS  -     predniSONE (DELTASONE) 20 mg tablet;  Take 2 Tabs by mouth daily for 5 days. With Breakfast  -     AMB SUPPLY ORDER for hand splints  -Ice as needed. -Given handout on hand exercises and treatment  - Diff Dx Carpal tunnel syndrome, tendonopathy, OA, Ulnar Neuropathy, radiculopathy peripheral neuropathy    Follow-up Disposition:  Return if symptoms worsen or fail to improve.

## 2017-09-19 NOTE — TELEPHONE ENCOUNTER
Medication: Advair 100/50 mcg, dose: 1 inhalation, how often: twice daily, current number of medication days provided: 90, refill per application. Lot #: [id number], EXP.07/18. This medication was received and verified for the following 1. Correct Patient, 2. Correct Diagnosis, 3. Correct Drug, 4. Correct route, and no current allergy to medication    Please contact patient to come  their medications.      Kamryn Galeana MSN, RN, FNP-C     [facility]

## 2017-09-25 ENCOUNTER — HOSPITAL ENCOUNTER (OUTPATIENT)
Dept: GENERAL RADIOLOGY | Age: 62
Discharge: HOME | End: 2017-09-25

## 2017-09-25 DIAGNOSIS — M25.532 LEFT WRIST PAIN: ICD-10-CM

## 2017-09-25 PROCEDURE — 73110 X-RAY EXAM OF WRIST: CPT

## 2017-09-25 NOTE — PROGRESS NOTES
Reviewed Xray results with patient via Telephone. Discussed with patient that she needs to wear hand splint to stabilize wrist and continue NSAIDS as needed for pain. Ortho referral was written at last appt and pt is pending an appt. Pt questions and concerns addressed.

## 2017-09-26 ENCOUNTER — TELEPHONE (OUTPATIENT)
Dept: FAMILY MEDICINE CLINIC | Age: 62
End: 2017-09-26

## 2017-09-26 RX ORDER — CLINDAMYCIN PHOSPHATE 10 MG/G
GEL TOPICAL 2 TIMES DAILY
COMMUNITY
End: 2017-09-26

## 2017-09-26 NOTE — PROGRESS NOTES
Appointment letter mailed to patient. [place] [facility] appt with Dr Esperanza Obregon Wednesday Oct 18, 2017 at 1:25 check-in.

## 2017-10-05 ENCOUNTER — TELEPHONE (OUTPATIENT)
Dept: FAMILY MEDICINE CLINIC | Age: 62
End: 2017-10-05

## 2017-10-05 NOTE — TELEPHONE ENCOUNTER
Medication: Proair 90mcg , dose: 1-2 puffs, how often: Q4h as needed , current number of medication days provided: 90, refill per application. Lot #: [id number], EXP.03/19. This medication was received and verified for the following 1. Correct Patient, 2. Correct Diagnosis, 3. Correct Drug, 4. Correct route, and no current allergy to medication. Please contact patient to come  their medications.      Estefania Bone, MSN, RN, FNP-C     [facility]

## 2017-10-10 ENCOUNTER — TELEPHONE (OUTPATIENT)
Dept: FAMILY MEDICINE CLINIC | Age: 62
End: 2017-10-10

## 2017-10-11 ENCOUNTER — HOSPITAL ENCOUNTER (OUTPATIENT)
Age: 62
Setting detail: OUTPATIENT SURGERY
Discharge: HOME | End: 2017-10-11
Attending: COLON & RECTAL SURGERY | Admitting: COLON & RECTAL SURGERY
Payer: SELF-PAY

## 2017-10-11 VITALS
OXYGEN SATURATION: 100 % | HEART RATE: 74 BPM | DIASTOLIC BLOOD PRESSURE: 70 MMHG | RESPIRATION RATE: 12 BRPM | SYSTOLIC BLOOD PRESSURE: 123 MMHG | TEMPERATURE: 97.9 F | HEIGHT: 67 IN | BODY MASS INDEX: 37.35 KG/M2 | WEIGHT: 238 LBS

## 2017-10-11 PROBLEM — Z12.11 ENCOUNTER FOR SCREENING COLONOSCOPY: Status: ACTIVE | Noted: 2017-10-11

## 2017-10-11 LAB
AMPHET UR QL SCN: NEGATIVE
BARBITURATES UR QL SCN: NEGATIVE
BENZODIAZ UR QL: NEGATIVE
CANNABINOIDS UR QL SCN: NEGATIVE
COCAINE UR QL SCN: NEGATIVE
HDSCOM,HDSCOM: NORMAL
METHADONE UR QL: NEGATIVE
OPIATES UR QL: NEGATIVE
PCP UR QL: NEGATIVE

## 2017-10-11 PROCEDURE — 76040000007: Performed by: COLON & RECTAL SURGERY

## 2017-10-11 PROCEDURE — 77030018846 HC SOL IRR STRL H20 ICUM -A: Performed by: COLON & RECTAL SURGERY

## 2017-10-11 PROCEDURE — 80307 DRUG TEST PRSMV CHEM ANLYZR: CPT

## 2017-10-11 PROCEDURE — 74011250636 HC RX REV CODE- 250/636

## 2017-10-11 PROCEDURE — C1729 CATH, DRAINAGE: HCPCS | Performed by: COLON & RECTAL SURGERY

## 2017-10-11 PROCEDURE — 74011250636 HC RX REV CODE- 250/636: Performed by: COLON & RECTAL SURGERY

## 2017-10-11 PROCEDURE — 77030008565 HC TBNG SUC IRR ERBE -B: Performed by: COLON & RECTAL SURGERY

## 2017-10-11 PROCEDURE — 74011250636 HC RX REV CODE- 250/636: Performed by: ANESTHESIOLOGY

## 2017-10-11 RX ORDER — SODIUM CHLORIDE 0.9 % (FLUSH) 0.9 %
5-10 SYRINGE (ML) INJECTION AS NEEDED
Status: DISCONTINUED | OUTPATIENT
Start: 2017-10-11 | End: 2017-10-11 | Stop reason: HOSPADM

## 2017-10-11 RX ORDER — ONDANSETRON 2 MG/ML
4 INJECTION INTRAMUSCULAR; INTRAVENOUS ONCE
Status: COMPLETED | OUTPATIENT
Start: 2017-10-11 | End: 2017-10-11

## 2017-10-11 RX ORDER — SODIUM CHLORIDE 0.9 % (FLUSH) 0.9 %
5-10 SYRINGE (ML) INJECTION EVERY 8 HOURS
Status: DISCONTINUED | OUTPATIENT
Start: 2017-10-11 | End: 2017-10-11 | Stop reason: HOSPADM

## 2017-10-11 RX ORDER — FENTANYL CITRATE 50 UG/ML
INJECTION, SOLUTION INTRAMUSCULAR; INTRAVENOUS AS NEEDED
Status: DISCONTINUED | OUTPATIENT
Start: 2017-10-11 | End: 2017-10-11 | Stop reason: HOSPADM

## 2017-10-11 RX ORDER — MIDAZOLAM HYDROCHLORIDE 1 MG/ML
.25-5 INJECTION, SOLUTION INTRAMUSCULAR; INTRAVENOUS
Status: DISCONTINUED | OUTPATIENT
Start: 2017-10-11 | End: 2017-10-11 | Stop reason: HOSPADM

## 2017-10-11 RX ORDER — DEXTROMETHORPHAN/PSEUDOEPHED 2.5-7.5/.8
1.2 DROPS ORAL
Status: DISCONTINUED | OUTPATIENT
Start: 2017-10-11 | End: 2017-10-11 | Stop reason: HOSPADM

## 2017-10-11 RX ORDER — FENTANYL CITRATE 50 UG/ML
50 INJECTION, SOLUTION INTRAMUSCULAR; INTRAVENOUS ONCE
Status: DISCONTINUED | OUTPATIENT
Start: 2017-10-11 | End: 2017-10-11 | Stop reason: HOSPADM

## 2017-10-11 RX ORDER — INSULIN LISPRO 100 [IU]/ML
INJECTION, SOLUTION INTRAVENOUS; SUBCUTANEOUS ONCE
Status: DISCONTINUED | OUTPATIENT
Start: 2017-10-11 | End: 2017-10-11 | Stop reason: HOSPADM

## 2017-10-11 RX ORDER — NALOXONE HYDROCHLORIDE 0.4 MG/ML
0.4 INJECTION, SOLUTION INTRAMUSCULAR; INTRAVENOUS; SUBCUTANEOUS
Status: DISCONTINUED | OUTPATIENT
Start: 2017-10-11 | End: 2017-10-11 | Stop reason: HOSPADM

## 2017-10-11 RX ORDER — SODIUM CHLORIDE 9 MG/ML
50 INJECTION, SOLUTION INTRAVENOUS CONTINUOUS
Status: DISCONTINUED | OUTPATIENT
Start: 2017-10-11 | End: 2017-10-11 | Stop reason: HOSPADM

## 2017-10-11 RX ORDER — SODIUM CHLORIDE, SODIUM LACTATE, POTASSIUM CHLORIDE, CALCIUM CHLORIDE 600; 310; 30; 20 MG/100ML; MG/100ML; MG/100ML; MG/100ML
75 INJECTION, SOLUTION INTRAVENOUS CONTINUOUS
Status: DISCONTINUED | OUTPATIENT
Start: 2017-10-11 | End: 2017-10-11 | Stop reason: HOSPADM

## 2017-10-11 RX ORDER — ATROPINE SULFATE 0.1 MG/ML
0.5 INJECTION INTRAVENOUS
Status: DISCONTINUED | OUTPATIENT
Start: 2017-10-11 | End: 2017-10-11 | Stop reason: HOSPADM

## 2017-10-11 RX ORDER — FLUMAZENIL 0.1 MG/ML
0.2 INJECTION INTRAVENOUS
Status: DISCONTINUED | OUTPATIENT
Start: 2017-10-11 | End: 2017-10-11 | Stop reason: HOSPADM

## 2017-10-11 RX ORDER — FAMOTIDINE 20 MG/1
20 TABLET, FILM COATED ORAL ONCE
Status: DISCONTINUED | OUTPATIENT
Start: 2017-10-11 | End: 2017-10-11

## 2017-10-11 RX ORDER — EPINEPHRINE 0.1 MG/ML
1 INJECTION INTRACARDIAC; INTRAVENOUS
Status: DISCONTINUED | OUTPATIENT
Start: 2017-10-11 | End: 2017-10-11 | Stop reason: HOSPADM

## 2017-10-11 RX ADMIN — ONDANSETRON 4 MG: 2 INJECTION INTRAMUSCULAR; INTRAVENOUS at 18:41

## 2017-10-11 RX ADMIN — SODIUM CHLORIDE, SODIUM LACTATE, POTASSIUM CHLORIDE, AND CALCIUM CHLORIDE 75 ML/HR: 600; 310; 30; 20 INJECTION, SOLUTION INTRAVENOUS at 14:00

## 2017-10-11 NOTE — H&P
[facility]  [id number] [place] [address], [address] [facility]        Colon and Rectal Surgery History and Physical    Subjective:      Kaylynn Nova is a 63 y.o. female who presents for colonoscopy consultation for   Personal history of colon polyps (screening only). She was referred by her PCP, Tahir Mccullough NP due to a positive FIT test.  Patient denies rectal pain or bleeding. Abdominal surgeries as described below, specifically cholecystectomy. Family history as described below, specifically maternal aunt and 2 sisters with colon polyps. Last colonoscopy was 5 years ago in [place], patient states she had polyps and was recalled for 5 years    There is not a family history of colon cancer.       Patient Active Problem List    Diagnosis Date Noted    Positive FIT (fecal immunochemical test) 07/26/2017    Hypomagnesemia 11/18/2015    Tobacco abuse counseling 11/18/2015    Degenerative arthritis     Glaucoma 03/30/2015    BMI 38.0-38.9,adult 03/30/2015    Arthritis of left knee 03/30/2015    Heart rate fast 12/11/2014    Tobacco use 12/11/2014    Need for hepatitis A immunization 12/11/2014    Hepatitis C infection 12/11/2014    Pruritus 12/11/2014    Erythrasma 04/29/2014    HTN (hypertension) 06/11/2013    Migraine 06/11/2013    Hives, physical 06/11/2013    Hep C w/o coma, chronic ([address] 06/11/2013    GERD (gastroesophageal reflux disease) 06/11/2013     Past Medical History:   Diagnosis Date    Arthritis of knee, left 2007    Chronic obstructive pulmonary disease ([address]     mild    Erythrasma May 2014    beba feet, lower left leg    Glaucoma     H/O mammogram 03/01/2016    normal, f/u in 1 year    Hepatitis C 2002    genotype 1a, previous IV drug user, declined interferons     Hypertension 2002    Positive FIT (fecal immunochemical test) 07/26/2017    Tobacco use     working with [employer] [facility] smoking cessation       Past Surgical History:   Procedure Laterality Date    HX CHOLECYSTECTOMY  2006    laparoscopic    HX COLONOSCOPY  2012    [facility]    HX OTHER SURGICAL  2002    liver biopsy    HX TUBAL LIGATION        Social History   Substance Use Topics    Smoking status: Current Every Day Smoker     Packs/day: 0.50     Types: Cigarettes     Start date: 1/12/1974    Smokeless tobacco: Never Used    Alcohol use No      Family History   Problem Relation Age of Onset    Hypertension Mother     COPD Mother     Other Mother      Poly? nervous system    Cancer Father      rare form bone     Colon Polyps Sister     Colon Polyps Maternal Aunt       Prior to Admission medications    Medication Sig Start Date End Date Taking? Authorizing Provider   ibuprofen (MOTRIN) 800 mg tablet Take 1 Tab by mouth every eight (8) hours as needed for Pain. Indications: Pain 6/22/17  Yes Jen Hooks NP   VITAMIN D2 50,000 unit capsule TAKE ONE CAPSULE BY MOUTH EVERY 7 DAYS 4/23/17  Yes Gabrielle Escalante NP   fluticasone-salmeterol (ADVAIR) 100-50 mcg/dose diskus inhaler Take 1 Puff by inhalation every twelve (12) hours. 1/24/17  Yes Jen Hooks NP   albuterol (PROAIR HFA) 90 mcg/actuation inhaler Take 1-2 Puffs by inhalation every four (4) hours as needed for Wheezing. 1/24/17  Yes Jen Hooks NP   olmesartan-hydroCHLOROthiazide (BENICAR HCT) 40-25 mg per tablet Take 1 Tab by mouth daily. Indications: Hypertension 1/24/17  Yes Jen Hooks NP   dexlansoprazole (DEXILANT) 30 mg capsule Take 1 Cap by mouth daily as needed. Indications: GASTROESOPHAGEAL REFLUX 1/24/17  Yes Jen Hooks NP   mometasone (NASONEX) 50 mcg/actuation nasal spray 2 Sprays by Both Nostrils route daily. 1/9/17  Yes Jen Hooks NP   verapamil (CALAN) 120 mg tablet Take 1 Tab by mouth two (2) times a day.  Indications: Hypertension 10/24/16  Yes Jen Hooks NP   magnesium oxide (MAG-OX) 400 mg tablet Take 1 Tab by mouth daily. 8/4/16  Yes Shonna De La Torre NP   travoprost (TRAVATAN Z) 0.004 % ophthalmic solution Administer 1 Drop to both eyes every evening. Indications: OPEN ANGLE GLAUCOMA 11/18/15  Yes Armando Leonardo NP   clobetasol (TEMOVATE) 0.05 % ointment Apply  to affected area two (2) times a day. Historical Provider   ledipasvir-sofosbuvir (HARVONI)  mg tab Take 1 Tab by mouth daily. Indications: CHRONIC HEPATITIS C - GENOTYPE 1 4/19/16   Armando Leonardo NP     No current facility-administered medications for this encounter. Current Outpatient Prescriptions   Medication Sig    ibuprofen (MOTRIN) 800 mg tablet Take 1 Tab by mouth every eight (8) hours as needed for Pain. Indications: Pain    VITAMIN D2 50,000 unit capsule TAKE ONE CAPSULE BY MOUTH EVERY 7 DAYS    fluticasone-salmeterol (ADVAIR) 100-50 mcg/dose diskus inhaler Take 1 Puff by inhalation every twelve (12) hours.  albuterol (PROAIR HFA) 90 mcg/actuation inhaler Take 1-2 Puffs by inhalation every four (4) hours as needed for Wheezing.  olmesartan-hydroCHLOROthiazide (BENICAR HCT) 40-25 mg per tablet Take 1 Tab by mouth daily. Indications: Hypertension    dexlansoprazole (DEXILANT) 30 mg capsule Take 1 Cap by mouth daily as needed. Indications: GASTROESOPHAGEAL REFLUX    mometasone (NASONEX) 50 mcg/actuation nasal spray 2 Sprays by Both Nostrils route daily.  verapamil (CALAN) 120 mg tablet Take 1 Tab by mouth two (2) times a day. Indications: Hypertension    magnesium oxide (MAG-OX) 400 mg tablet Take 1 Tab by mouth daily.  travoprost (TRAVATAN Z) 0.004 % ophthalmic solution Administer 1 Drop to both eyes every evening. Indications: OPEN ANGLE GLAUCOMA    clobetasol (TEMOVATE) 0.05 % ointment Apply  to affected area two (2) times a day.  ledipasvir-sofosbuvir (HARVONI)  mg tab Take 1 Tab by mouth daily.  Indications: CHRONIC HEPATITIS C - GENOTYPE 1     Allergies   Allergen Reactions    Compazine [Prochlorperazine] Hives            Objective:     Visit Vitals    Ht 5' 7\" (1.702 m)    Wt 108 kg (238 lb)    BMI 37.28 kg/m2        Physical Exam:   GENERAL: alert, cooperative, no distress, appears stated age  LUNG: clear to auscultation bilaterally  HEART: regular rate and rhythm  ABDOMEN: soft, non-tender. Bowel sounds normal. No masses,  no organomegaly       Assessment:     Kylee Sharp is a 63 y.o. female who requires colonoscopy for   Personal history of colon polyps (screening only). Plan:     1. I recommend proceeding with colonoscopy. The patient was in full agreement and was eager to proceed. 2. I discussed the details of the colonoscopy procedure. The risks of colonoscopy were discussed including colon injury/perforation, anesthesia issues, bleeding, and the possibility of incomplete examination. The patient was willing to accept these risks and proceed with the examination. All questions were answered to the patient's satisfaction.          Nat Curling, MD, FACS, FASCRS  Colon and Rectal Surgery  [facility]  Office [phone]  Fax     [phone]  10/11/2017  12:48 PM

## 2017-10-11 NOTE — DISCHARGE INSTRUCTIONS
Colonoscopy Discharge Instructions       Manuela Zhang  [id number]  1955      COLONOSCOPY FINDINGS:  Your colonoscopy showed:   Mild diverticulosis of the sigmoid colon, otherwise normal examination. FOLLOW UP RECOMMENDATIONS:   Dr. Samuel Bah recommends your next colonoscopy in 5 years. DISCOMFORT:  If you have redness at your IV site- apply warm compress to area; if redness or soreness persist- contact your physician  There may be a slight amount of blood passed from the rectum, more than a teaspoon of bright red blood is not expected - contact your physician  Gaseous discomfort is common- walking, belching will help relieve any gas pains. If discomfort persist- contact your physician    DIET:   High fiber diet. ACTIVITY:  You may resume your normal daily activities, however, it is recommended that you spend the remainder of the day resting - avoiding any strenuous activities. You may not operate a vehicle for 24 hours  You may not engage in an occupation involving machinery or appliances for rest of today  You may not drink alcoholic beverages for at least 24 hours  Avoid making any critical decisions for at least 24 hour    CALL M.D.   ANY SIGN OF:   Increasing pain, nausea, vomiting  Abdominal distension (swelling)  New increased bleeding   Fever or chills  Pain in chest area or shortness of breath      Marichuy Burns MD, FACS, FASCRS  Colon and Rectal Surgery  [employer] [facility]  Office [phone]  Fax     [phone] SUMMARY from Nurse    The following personal items are in your possession at time of discharge:    Dental Appliances: None  Visual Aid: None                  PATIENT INSTRUCTIONS:    After general anesthesia or intravenous sedation, for 24 hours or while taking prescription Narcotics:  · Limit your activities  · Do not drive and operate hazardous machinery  · Do not make important personal or business decisions  · Do  not drink alcoholic beverages  · If you have not urinated within 8 hours after discharge, please contact your surgeon on call. Report the following to your surgeon:  · Excessive pain, swelling, redness or odor of or around the surgical area  · Temperature over 100.5  · Nausea and vomiting lasting longer than 4 hours or if unable to take medications  · Any signs of decreased circulation or nerve impairment to extremity: change in color, persistent  numbness, tingling, coldness or increase pain  · Any questions      What to do at Home:  Recommended activity: Activity as tolerated and no driving for today. *  Please give a list of your current medications to your Primary Care Provider. *  Please update this list whenever your medications are discontinued, doses are      changed, or new medications (including over-the-counter products) are added. *  Please carry medication information at all times in case of emergency situations. These are general instructions for a healthy lifestyle:    No smoking/ No tobacco products/ Avoid exposure to second hand smoke    Surgeon General's Warning:  Quitting smoking now greatly reduces serious risk to your health. Obesity, smoking, and sedentary lifestyle greatly increases your risk for illness    A healthy diet, regular physical exercise & weight monitoring are important for maintaining a healthy lifestyle    You may be retaining fluid if you have a history of heart failure or if you experience any of the following symptoms:  Weight gain of 3 pounds or more overnight or 5 pounds in a week, increased swelling in our hands or feet or shortness of breath while lying flat in bed. Please call your doctor as soon as you notice any of these symptoms; do not wait until your next office visit.     Recognize signs and symptoms of STROKE:    F-face looks uneven    A-arms unable to move or move unevenly    S-speech slurred or non-existent    T-time-call 911 as soon as signs and symptoms begin-DO NOT go       Back to bed or wait to see if you get better-TIME IS BRAIN. Warning Signs of HEART ATTACK     Call 911 if you have these symptoms:   Chest discomfort. Most heart attacks involve discomfort in the center of the chest that lasts more than a few minutes, or that goes away and comes back. It can feel like uncomfortable pressure, squeezing, fullness, or pain.  Discomfort in other areas of the upper body. Symptoms can include pain or discomfort in one or both arms, the back, neck, jaw, or stomach.  Shortness of breath with or without chest discomfort.  Other signs may include breaking out in a cold sweat, nausea, or lightheadedness. Don't wait more than five minutes to call 911 - MINUTES MATTER! Fast action can save your life. Calling 911 is almost always the fastest way to get lifesaving treatment. Emergency Medical Services staff can begin treatment when they arrive -- up to an hour sooner than if someone gets to the hospital by car. Colonoscopy: What to Expect at [address] [place] [address]  After you have a colonoscopy, you will stay at the clinic for 1 to 2 hours until the medicines wear off. Then you can go home. But you will need to arrange for a ride. Your doctor will tell you when you can eat and do your other usual activities. Your doctor will talk to you about when you will need your next colonoscopy. Your doctor can help you decide how often you need to be checked. This will depend on the results of your test and your risk for colorectal cancer. After the test, you may be bloated or have gas pains. You may need to pass gas. If a biopsy was done or a polyp was removed, you may have streaks of blood in your stool (feces) for a few days. This care sheet gives you a general idea about how long it will take for you to recover. But each person recovers at a different pace. Follow the steps below to get better as quickly as possible.   How can you care for yourself at home?  Activity  · Rest when you feel tired. · You can do your normal activities when it feels okay to do so. Diet  · Follow your doctor's directions for eating. · Unless your doctor has told you not to, drink plenty of fluids. This helps to replace the fluids that were lost during the colon prep. · Do not drink alcohol. Medicines  · Your doctor will tell you if and when you can restart your medicines. He or she will also give you instructions about taking any new medicines. · If you take blood thinners, such as warfarin (Coumadin), clopidogrel (Plavix), or aspirin, be sure to talk to your doctor. He or she will tell you if and when to start taking those medicines again. Make sure that you understand exactly what your doctor wants you to do. · If polyps were removed or a biopsy was done during the test, your doctor may tell you not to take aspirin or other anti-inflammatory medicines for a few days. These include ibuprofen (Advil, Motrin) and naproxen (Aleve). Other instructions  · For your safety, do not drive or operate machinery until the medicine wears off and you can think clearly. Your doctor may tell you not to drive or operate machinery until the day after your test.  · Do not sign legal documents or make major decisions until the medicine wears off and you can think clearly. The anesthesia can make it hard for you to fully understand what you are agreeing to. Follow-up care is a key part of your treatment and safety. Be sure to make and go to all appointments, and call your doctor if you are having problems. It's also a good idea to know your test results and keep a list of the medicines you take. When should you call for help? Call 911 anytime you think you may need emergency care. For example, call if:  · You passed out (lost consciousness). · You pass maroon or bloody stools. · You have severe belly pain.   Call your doctor now or seek immediate medical care if:  · Your stools are black and tarlike. · Your stools have streaks of blood, but you did not have a biopsy or any polyps removed. · You have belly pain, or your belly is swollen and firm. · You vomit. · You have a fever. · You are very dizzy. Watch closely for changes in your health, and be sure to contact your doctor if you have any problems. Where can you learn more? Go to http://drew-tarun.info/. Enter E264 in the search box to learn more about \"Colonoscopy: What to Expect at Home. \"  Current as of: August 9, 2016  Content Version: 11.3  © 2409-8416 [employer]. Care instructions adapted under license by [employer] (which disclaims liability or warranty for this information). If you have questions about a medical condition or this instruction, always ask your healthcare professional. [place] [address] any warranty or liability for your use of this information. The discharge information has been reviewed with the patient and family. The patient and family verbalized understanding. Discharge medications reviewed with the patient and family and appropriate educational materials and side effects teaching were provided.

## 2017-10-11 NOTE — IP AVS SNAPSHOT
[address] [address] [address] Patient: Gaurav Braun MRN: [id number] KCL:[id number] You are allergic to the following Allergen Reactions Compazine (Prochlorperazine) Hives Recent Documentation Height Weight BMI OB Status Smoking Status 1.702 m 108 kg 37.28 kg/m2 Postmenopausal Current Every Day Smoker Emergency Contacts Name Discharge Info Relation Home Work Mobile Osvaldo Yanes (Aunt) DISCHARGE CAREGIVER [3] Other Relative [6] [phone] [facility] N/A  AT THIS TIME [6] Daughter [21] [phone] About your hospitalization You were admitted on:  October 11, 2017 You last received care in the:  [facility] PHASE 2 RECOVERY You were discharged on:  October 11, 2017 Unit phone number:  [phone] Why you were hospitalized Your primary diagnosis was:  Not on File Your diagnoses also included:  Encounter For Screening Colonoscopy Providers Seen During Your Hospitalizations Provider Role Specialty Primary office phone Mahnaz Ramirez MD Attending Provider Colon and Rectal Surgery [phone] Your Primary Care Physician (PCP) Primary Care Physician Office Phone Office Fax Julia Tayolr [phone] Follow-up Information Follow up With Details Comments Contact Info Maxwell Cosme, NP   [address] [facility] [phone] Mahnaz Ramirez MD  Please contact Dr Wayne Stanley for any questions [address] [facility] [address] 
[phone] Your Appointments Wednesday October 18, 2017  1:40 PM EDT New Patient with Katherin Frederick MD  
[address] [facility] [address] [facility] [address]  
[phone] Current Discharge Medication List  
  
 CONTINUE these medications which have NOT CHANGED Dose & Instructions Dispensing Information Comments Morning Noon Evening Bedtime  
 albuterol 90 mcg/actuation inhaler Commonly known as:  PROAIR HFA Your last dose was: Your next dose is:    
   
   
 Dose:  1-2 Puff Take 1-2 Puffs by inhalation every four (4) hours as needed for Wheezing. Quantity:  3 Inhaler Refills:  3  
     
   
   
   
  
 clobetasol 0.05 % ointment Commonly known as:  Snehal L[place]hal Your last dose was: Your next dose is:    
   
   
 Apply  to affected area two (2) times a day. Refills:  0  
     
   
   
   
  
 dexlansoprazole 30 mg capsule Commonly known as:  DEXILANT Your last dose was: Your next dose is:    
   
   
 Dose:  30 mg Take 1 Cap by mouth daily as needed. Indications: GASTROESOPHAGEAL REFLUX Quantity:  90 Cap Refills:  3  
     
   
   
   
  
 fluticasone-salmeterol 100-50 mcg/dose diskus inhaler Commonly known as:  ADVAIR Your last dose was: Your next dose is:    
   
   
 Dose:  1 Puff Take 1 Puff by inhalation every twelve (12) hours. Quantity:  3 Inhaler Refills:  3  
     
   
   
   
  
 ibuprofen 800 mg tablet Commonly known as:  MOTRIN Your last dose was: Your next dose is:    
   
   
 Dose:  800 mg Take 1 Tab by mouth every eight (8) hours as needed for Pain. Indications: Pain Quantity:  30 Tab Refills:  0  
     
   
   
   
  
 ledipasvir-sofosbuvir  mg Tab Commonly known as:  Adams Faes Your last dose was: Your next dose is:    
   
   
 Dose:  1 Tab Take 1 Tab by mouth daily. Indications: CHRONIC HEPATITIS C - GENOTYPE 1 Quantity:  90 Tab Refills:  0 Pending Hep C completion in April 2016 and then will start. Do not remove from profile. Dr. Lavern Cerda GI also concurs with therapy  
    
   
   
   
  
 magnesium oxide 400 mg tablet Commonly known as:  MAG-OX Your last dose was: Your next dose is:    
   
   
 Dose:  400 mg Take 1 Tab by mouth daily. Quantity:  30 Tab Refills:  11  
     
   
   
   
  
 mometasone 50 mcg/actuation nasal spray Commonly known as:  Velsp [place] Your last dose was: Your next dose is:    
   
   
 Dose:  2 Spray 2 Sprays by Both Nostrils route daily. Quantity:  2 Container Refills:  0  
     
   
   
   
  
 olmesartan-hydroCHLOROthiazide 40-25 mg per tablet Commonly known as:  BENICAR HCT Your last dose was: Your next dose is:    
   
   
 Dose:  1 Tab Take 1 Tab by mouth daily. Indications: Hypertension Quantity:  90 Tab Refills:  3  
     
   
   
   
  
 travoprost 0.004 % ophthalmic solution Commonly known as:  TRAVATAN Z Your last dose was: Your next dose is:    
   
   
 Dose:  1 Drop Administer 1 Drop to both eyes every evening. Indications: OPEN ANGLE GLAUCOMA Quantity:  5 mL Refills:  3  
     
   
   
   
  
 verapamil 120 mg tablet Commonly known as:  CALAN Your last dose was: Your next dose is:    
   
   
 Dose:  120 mg Take 1 Tab by mouth two (2) times a day. Indications: Hypertension Quantity:  60 Tab Refills:  5 VITAMIN D2 50,000 unit capsule Generic drug:  ergocalciferol Your last dose was: Your next dose is: TAKE ONE CAPSULE BY MOUTH EVERY 7 DAYS Quantity:  4 Cap Refills:  8 Discharge Instructions Colonoscopy Discharge Instructions Chucky Bates [id number] 
1955 COLONOSCOPY FINDINGS: 
Your colonoscopy showed:   Mild diverticulosis of the sigmoid colon, otherwise normal examination. FOLLOW UP RECOMMENDATIONS: 
 Dr. Angela Epps recommends your next colonoscopy in 5 years. DISCOMFORT: 
If you have redness at your IV site- apply warm compress to area; if redness or soreness persist- contact your physician There may be a slight amount of blood passed from the rectum, more than a teaspoon of bright red blood is not expected - contact your physician Gaseous discomfort is common- walking, belching will help relieve any gas pains. If discomfort persist- contact your physician DIET: 
 High fiber diet. ACTIVITY: 
You may resume your normal daily activities, however, it is recommended that you spend the remainder of the day resting - avoiding any strenuous activities. You may not operate a vehicle for 24 hours You may not engage in an occupation involving machinery or appliances for rest of today You may not drink alcoholic beverages for at least 24 hours Avoid making any critical decisions for at least 24 hour CALL M.D. ANY SIGN OF: Increasing pain, nausea, vomiting Abdominal distension (swelling) New increased bleeding Fever or chills Pain in chest area or shortness of breath Umu Crowley MD, FACS, FASCRS Colon and Rectal Surgery [address] [place] [address] [facility] Office [phone] Fax     [phone] DISCHARGE SUMMARY from Nurse The following personal items are in your possession at time of discharge: 
 
Dental Appliances: None Visual Aid: None PATIENT INSTRUCTIONS: 
 
 
F-face looks uneven A-arms unable to move or move unevenly S-speech slurred or non-existent T-time-call 911 as soon as signs and symptoms begin-DO NOT go Back to bed or wait to see if you get better-TIME IS BRAIN. Warning Signs of HEART ATTACK Call 911 if you have these symptoms: 
? Chest discomfort. Most heart attacks involve discomfort in the center of the chest that lasts more than a few minutes, or that goes away and comes back. It can feel like uncomfortable pressure, squeezing, fullness, or pain. ? Discomfort in other areas of the upper body. Symptoms can include pain or discomfort in one or both arms, the back, neck, jaw, or stomach. ? Shortness of breath with or without chest discomfort. ? Other signs may include breaking out in a cold sweat, nausea, or lightheadedness. Don't wait more than five minutes to call [address]! Fast action can save your life. Calling 911 is almost always the fastest way to get lifesaving treatment. Emergency Medical Services staff can begin treatment when they arrive  up to an hour sooner than if someone gets to the hospital by car. Colonoscopy: What to Expect at [facility] Your Recovery After you have a colonoscopy, you will stay at the clinic for 1 to 2 hours until the medicines wear off. Then you can go home. But you will need to arrange for a ride. Your doctor will tell you when you can eat and do your other usual activities. Your doctor will talk to you about when you will need your next colonoscopy. Your doctor can help you decide how often you need to be checked. This will depend on the results of your test and your risk for colorectal cancer. After the test, you may be bloated or have gas pains. You may need to pass gas. If a biopsy was done or a polyp was removed, you may have streaks of blood in your stool (feces) for a few days. This care sheet gives you a general idea about how long it will take for you to recover. But each person recovers at a different pace. Follow the steps below to get better as quickly as possible. How can you care for yourself at home? Activity · Rest when you feel tired. · You can do your normal activities when it feels okay to do so. Diet · Follow your doctor's directions for eating. · Unless your doctor has told you not to, drink plenty of fluids. This helps to replace the fluids that were lost during the colon prep. · Do not drink alcohol. Medicines · Your doctor will tell you if and when you can restart your medicines. He or she will also give you instructions about taking any new medicines. · If you take blood thinners, such as warfarin (Coumadin), clopidogrel (Plavix), or aspirin, be sure to talk to your doctor. He or she will tell you if and when to start taking those medicines again. Make sure that you understand exactly what your doctor wants you to do. · If polyps were removed or a biopsy was done during the test, your doctor may tell you not to take aspirin or other anti-inflammatory medicines for a few days. These include ibuprofen (Advil, Motrin) and naproxen (Aleve). Other instructions · For your safety, do not drive or operate machinery until the medicine wears off and you can think clearly. Your doctor may tell you not to drive or operate machinery until the day after your test. 
· Do not sign legal documents or make major decisions until the medicine wears off and you can think clearly. The anesthesia can make it hard for you to fully understand what you are agreeing to. Follow-up care is a key part of your treatment and safety. Be sure to make and go to all appointments, and call your doctor if you are having problems. It's also a good idea to know your test results and keep a list of the medicines you take. When should you call for help? Call 911 anytime you think you may need emergency care. For example, call if: 
· You passed out (lost consciousness). · You pass maroon or bloody stools. · You have severe belly pain. Call your doctor now or seek immediate medical care if: 
· Your stools are black and tarlike. · Your stools have streaks of blood, but you did not have a biopsy or any polyps removed. · You have belly pain, or your belly is swollen and firm. · You vomit. · You have a fever. · You are very dizzy. Watch closely for changes in your health, and be sure to contact your doctor if you have any problems. Where can you learn more? Go to http://drew-tarun.info/. Enter E264 in the search box to learn more about \"Colonoscopy: What to Expect at Home. \" Current as of: August 9, 2016 Content Version: 11.3 © 1881-2601 [employer]. Care instructions adapted under license by [employer] (which disclaims liability or warranty for this information). If you have questions about a medical condition or this instruction, always ask your healthcare professional. [address] any warranty or liability for your use of this information. The discharge information has been reviewed with the patient and family. The patient and family verbalized understanding. Discharge medications reviewed with the patient and family and appropriate educational materials and side effects teaching were provided. Discharge Orders None Introducing [facility]! Dear Henna Ramirez: Thank you for requesting a [employer] account. Our records indicate that you already have an active [employer] account. You can access your account anytime at [email or web address] Did you know that you can access your hospital and ER discharge instructions at any time in [employer]? You can also review all of your test results from your hospital stay or ER visit. Additional Information If you have questions, please visit the Frequently Asked Questions section of the [employer] website at [email or web address] Remember, [employer] is NOT to be used for urgent needs. For medical emergencies, dial 911. Now available from your iPhone and Android! General Information Please provide this summary of care documentation to your next provider. Patient Signature:  ____________________________________________________________  Date:  ____________________________________________________________  
  
Viviane Snyder    
    
 Provider Signature:  ____________________________________________________________ Date:  ____________________________________________________________

## 2017-10-11 NOTE — PROCEDURES
Colonoscopy Procedure Note      Yamila Leahy  1955  [id number]                Date of Procedure: 10/11/2017    Indications:    Family history of coloretal adenoma  (screening only), Personal history of colon polyps (screening only)     Preoperative diagnosis: Colon cancer screening [Z12.11]      Postoperative diagnosis:  Diverticulosis    Title of Procedure:  Colonoscopy, screening (high risk)    :  Raf Patel MD    Assistant(s): Endoscopy Technician-1: Renny Gotti  Endoscopy RN-1: Yoni Kulkarni RN  Endoscopy RN-2: Victor M Moore RN    Referring Source:  Burak Smiley NP    Sedation:  Fentanyl 100 mcg IV,  Versed 4 mg IV      ASA Class: ASA 3 - Severe systemic disease but compensated        Procedure Details:    Prior to the procedure, a history and physical were performed. The patients medications, allergies and sensitivities were reviewed and all questions were answered. After informed consent was obtained for the procedure, with all risks and benefits of procedure explained. The patient was taken to the endoscopy suite and placed in the left lateral decubitus position. Patient identification and proposed procedure were verified prior to the procedure by the nurse and I. Following the  satisfactory administration of sedation,  the anus was inspected and appeared within normal limits. Digital rectal examination revealed Normal sphincter tone and squeeze pressure. Palpation revealed No Masses. Next the Olympus video colonoscope was introduced through the anus and advanced to cecum, which was identified by the ileocecal valve and appendiceal orifice, terminal ileum. The quality of preparation was fair. The terminal ileum was visualized. The colonoscope was then slowly withdrawn and the mucosa carefully examined for any abnormalities. Cecal withdrawl time was greater than six minutes. The patient tolerated the procedure well.       Findings:   Rectum: normal  Sigmoid: mild diverticulosis; Descending Colon: normal  Transverse Colon: normal  Ascending Colon: normal  Cecum: normal  Terminal Ileum: normal      Interventions:  none    Specimen Removed: * No specimens in log *     Complications: None. EBL:  None. Impression:     Diverticulosis     Recommendations: -Repeat colonoscopy in 5 years. Resume normal medication(s). Discharge Disposition:  Home in the company of a  when able to ambulate.         Laith Hudson MD, FACS, FASCRS  Colon and Rectal Surgery  [facility]  Office [phone]  Fax     [phone]  10/11/2017  6:06 PM       [facility]  [address] [address]

## 2017-10-11 NOTE — PERIOP NOTES
[address] [facility] Dr Marisabel Mansfield stated that patient is a ASA of 3.  states he is unaware of how to chart ASA prior to procedure.

## 2017-10-18 ENCOUNTER — OFFICE VISIT (OUTPATIENT)
Dept: ORTHOPEDIC SURGERY | Age: 62
End: 2017-10-18

## 2017-10-18 VITALS
WEIGHT: 231 LBS | RESPIRATION RATE: 18 BRPM | SYSTOLIC BLOOD PRESSURE: 124 MMHG | OXYGEN SATURATION: 99 % | BODY MASS INDEX: 36.26 KG/M2 | HEART RATE: 84 BPM | DIASTOLIC BLOOD PRESSURE: 70 MMHG | TEMPERATURE: 97.4 F | HEIGHT: 67 IN

## 2017-10-18 DIAGNOSIS — M25.532 LEFT WRIST PAIN: ICD-10-CM

## 2017-10-18 DIAGNOSIS — S63.502A SPRAIN OF LEFT WRIST, INITIAL ENCOUNTER: ICD-10-CM

## 2017-10-18 DIAGNOSIS — M67.332 TRANSIENT SYNOVITIS, LEFT WRIST: Primary | ICD-10-CM

## 2017-10-18 NOTE — PATIENT INSTRUCTIONS
Hand Sprain: Care Instructions  Your Care Instructions  A hand sprain occurs when you stretch or tear a ligament in your hand. Ligaments are the tough tissues that connect one bone to another. Most hand sprains will heal with treatment you can do at home. Follow-up care is a key part of your treatment and safety. Be sure to make and go to all appointments, and call your doctor if you are having problems. It's also a good idea to know your test results and keep a list of the medicines you take. How can you care for yourself at home? · If your doctor gave you a splint or immobilizer, wear it as directed. This will help keep swelling down and help your hand heal.  · Follow your doctor's directions for exercise and other activity. · For the first 2 days after your injury, avoid things that might increase swelling, such as hot showers, hot tubs, or hot packs. · Put ice or a cold pack on your hand for 10 to 20 minutes at a time to stop swelling. Try this every 1 to 2 hours for 3 days (when you are awake) or until the swelling goes down. Put a thin cloth between the ice pack and your skin. Keep your splint dry. · After 2 or 3 days, if your swelling is gone, put a heating pad (set on low) or a warm cloth on your hand. Some experts suggest that you go back and forth between hot and cold treatments. · Prop up your hand on a pillow when you ice it or anytime you sit or lie down. Try to keep it above the level of your heart. This will help reduce swelling. · Take pain medicines exactly as directed. ¨ If the doctor gave you a prescription medicine for pain, take it as prescribed. ¨ If you are not taking a prescription pain medicine, ask your doctor if you can take an over-the-counter medicine. · Return to your usual level of activity slowly. When should you call for help? Call your doctor now or seek immediate medical care if:  · Your pain is worse. · You have new or increased swelling in your hand.   · You cannot move your hand. · You have tingling, weakness, or numbness in your hand or fingers. · Your hand or fingers are cool or pale or change color. · You have a fever. · Your hand or fingers are red. Watch closely for changes in your health, and be sure to contact your doctor if:  · Your hand does not get better as expected. Where can you learn more? Go to http://drew-tarun.info/. Enter X240 in the search box to learn more about \"Hand Sprain: Care Instructions. \"  Current as of: March 21, 2017  Content Version: 11.3  © 3186-5363 [employer]. Care instructions adapted under license by [employer] (which disclaims liability or warranty for this information). If you have questions about a medical condition or this instruction, always ask your healthcare professional. [address] any warranty or liability for your use of this information.

## 2017-10-18 NOTE — PROGRESS NOTES
Gaurav Braun  1955   Chief Complaint   Patient presents with    Wrist Pain     LEFT        HISTORY OF PRESENT ILLNESS  Gaurav Braun is a 63 y.o. female who presents today for evaluation of left wrist pain. she rates her pain 5/10 today. Pain has been present for about 3 months without injury. 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 Patient describes the pain as aching and throbbing that is Constant in nature. Symptoms are worse with Work and lifting, bumping the wrist, and some motions of the wrist and is better with  Rest. Associated symptoms include weakness, stiffness and occasional numbness. Since problem started, it: is unchanged. Pain does wake patient up at night. Has taken Ibuprofen, MDP for the problem. She has tried several ACE bandages. Has tried following treatments: Injections:NO; Brace:NO;  Therapy:NO; Cane/Crutch:NO       Allergies   Allergen Reactions    Compazine [Prochlorperazine] Hives        Past Medical History:   Diagnosis Date    Arthritis of knee, left 2007    Chronic obstructive pulmonary disease (HCC)     mild    Diverticulosis of sigmoid colon 10/2017    Mild diverticulosis    Erythrasma May 2014    beba feet, lower left leg    Glaucoma     H/O mammogram 03/01/2016    normal, f/u in 1 year    Hepatitis C 2002    genotype 1a, previous IV drug user, declined interferons     Hypertension 2002    Positive FIT (fecal immunochemical test) 07/26/2017    Tobacco use     working with [employer] [facility] [facility] [facility] [employer] smoking cessation       Social History     Social History    Marital status:      Spouse name: N/A    Number of children: 2    Years of education: N/A     Occupational History   [address]      Social History Main Topics    Smoking status: Current Every Day Smoker     Packs/day: 0.50     Types: Cigarettes     Start date: 1/12/1974    Smokeless tobacco: Never Used    Alcohol use No    Drug use: Yes      Comment: IV crack cocaine, in remission since 2002    Sexual activity: Yes     Partners: Male     Other Topics Concern     Service No    Blood Transfusions No    Caffeine Concern No    Occupational Exposure No    Hobby Hazards No    Sleep Concern No    Stress Concern No    Weight Concern Yes     want bypass    Special Diet No    Back Care No    Exercise No    Bike Helmet No    Seat Belt No    Self-Exams No     Social History Narrative      Past Surgical History:   Procedure Laterality Date    COLONOSCOPY N/A 10/11/2017    COLONOSCOPY performed by Delta Tabor MD at [address] HX CHOLECYSTECTOMY  2006    laparoscopic    HX COLONOSCOPY  2012    hx of polyps, [place] HX COLONOSCOPY  10/2017     Dr. Mandy Cleaning recommends your next colonoscopy in 5 years.  HX OTHER SURGICAL  2002    liver biopsy    HX TUBAL LIGATION        Family History   Problem Relation Age of Onset    Hypertension Mother     COPD Mother     Other Mother      Poly? nervous system    Cancer Father      rare form bone     Colon Polyps Sister     Colon Polyps Maternal Aunt       Current Outpatient Prescriptions   Medication Sig    VITAMIN D2 50,000 unit capsule TAKE ONE CAPSULE BY MOUTH EVERY 7 DAYS    fluticasone-salmeterol (ADVAIR) 100-50 mcg/dose diskus inhaler Take 1 Puff by inhalation every twelve (12) hours.  albuterol (PROAIR HFA) 90 mcg/actuation inhaler Take 1-2 Puffs by inhalation every four (4) hours as needed for Wheezing.  olmesartan-hydroCHLOROthiazide (BENICAR HCT) 40-25 mg per tablet Take 1 Tab by mouth daily. Indications: Hypertension    dexlansoprazole (DEXILANT) 30 mg capsule Take 1 Cap by mouth daily as needed. Indications: GASTROESOPHAGEAL REFLUX    verapamil (CALAN) 120 mg tablet Take 1 Tab by mouth two (2) times a day. Indications: Hypertension    magnesium oxide (MAG-OX) 400 mg tablet Take 1 Tab by mouth daily.     travoprost (TRAVATAN Z) 0.004 % ophthalmic solution Administer 1 Drop to both eyes every evening. Indications: OPEN ANGLE GLAUCOMA    ibuprofen (MOTRIN) 800 mg tablet Take 1 Tab by mouth every eight (8) hours as needed for Pain. Indications: Pain    mometasone (NASONEX) 50 mcg/actuation nasal spray 2 Sprays by Both Nostrils route daily.  clobetasol (TEMOVATE) 0.05 % ointment Apply  to affected area two (2) times a day.  ledipasvir-sofosbuvir (HARVONI)  mg tab Take 1 Tab by mouth daily. Indications: CHRONIC HEPATITIS C - GENOTYPE 1     No current facility-administered medications for this visit. REVIEW OF SYSTEM   Patient denies: Weight loss, Fever/Chills, HA, Visual changes, Fatigue, Chest pain, SOB, Abdominal pain, N/V/D/C, Blood in stool or urine, Edema. Pertinent positive as above in HPI. All others were negative    PHYSICAL EXAM:   Visit Vitals    /70    Pulse 84    Temp 97.4 °F (36.3 °C) (Oral)    Resp 18    Ht 5' 7\" (1.702 m)    Wt 231 lb (104.8 kg)    SpO2 99%    BMI 36.18 kg/m2     The patient is a well-developed, well-nourished female   in no acute distress. The patient is alert and oriented times three. The patient is alert and oriented times three. Mood and affect are normal.  LYMPHATIC: lymph nodes are not enlarged and are within normal limits  SKIN: normal in color and non tender to palpation. There are no bruises or abrasions noted. NEUROLOGICAL: Motor sensory exam is within normal limits. Reflexes are equal bilaterally.  There is normal sensation to pinprick and light touch  MUSCULOSKELETAL:  Examination Left Hand   Skin Intact   Deformity -   Swelling -   Tenderness -   Tenderness A1 Pulley -   Finger flexion Full   Finger extension Full   Thenar Eminence Atrophy -   Sensation Normal   Capillary refill -   Heberden's nodes -   Dupuytren's -     Examination Left Wrist   Skin Intact   Tenderness diffuse   Flexion 60   Extension 60   Deformity -   Effusion -   Tinnel's sign -   Phalen's test - Finklestein maneuver -   Pain with thumb abduction -        IMAGING:   XR of the left wrist dated 10/18/17 was reviewed and read: 4 views with scaphoid view show no acute abnormalities. XR of the left wrist dated 9/25/17 was reviewed and read:   IMPRESSION:  1. Possible nondisplaced fracture of the anterior lunate as above. Slightly  prominent scapholunate interval, possibly ligamental injury or laxity. IMPRESSION:      ICD-10-CM ICD-9-CM    1. Transient synovitis, left wrist M67.332 727.05 AMB SUPPLY ORDER      REFERRAL TO OCCUPATIONAL THERAPY   2. Left wrist pain M25.532 719.43 AMB POC XRAY, WRIST; 2 VIEWS      AMB SUPPLY ORDER      REFERRAL TO OCCUPATIONAL THERAPY   3. Sprain of left wrist, initial encounter S63.502A 842.00 AMB SUPPLY ORDER      REFERRAL TO OCCUPATIONAL THERAPY        PLAN:  1. I am not concerned about a fracture. I offered a MDP but patient states she tried this with no relief, she will continue taking the Ibuprofen. If she does not respond to the treatment measures, we will consider an MRI. Risk factors include: htn  2. No cortisone injection indicated today   3. Yes Occupational Therapy indicated today  4. No diagnostic test indicated today  5. Yes durable medical equipment indicated today: WRIST BRACE  6. No referral indicated today   7. No medications indicated today  8.  No Narcotic indicated today    RTC 3 weeks  Follow-up Disposition: Not on File    Scribed by Gavin Motta[address]) as dictated by KEERTHI Millard [address] and Spine Specialist

## 2017-10-24 ENCOUNTER — APPOINTMENT (OUTPATIENT)
Dept: PHYSICAL THERAPY | Age: 62
End: 2017-10-24

## 2017-10-30 ENCOUNTER — HOSPITAL ENCOUNTER (OUTPATIENT)
Dept: PHYSICAL THERAPY | Age: 62
Discharge: HOME | End: 2017-10-30
Payer: SELF-PAY

## 2017-10-30 PROCEDURE — 97018 PARAFFIN BATH THERAPY: CPT

## 2017-10-30 PROCEDURE — 97165 OT EVAL LOW COMPLEX 30 MIN: CPT

## 2017-10-30 PROCEDURE — 97110 THERAPEUTIC EXERCISES: CPT

## 2017-10-30 NOTE — PROGRESS NOTES
OT DAILY TREATMENT NOTE  3-16    Patient Name: Tima Suresh  Date:10/30/2017  : 1955  [x]  Patient  Verified  Payor: SELF PAY / Plan: [facility] SELF PAY / Product Type: Self Pay /    In time:1010  Out time:1050  Total Treatment Time (min): 40  Visit #: 1 of 10    Treatment Area: Pain in left wrist [M25.532]  Transient synovitis, left wrist [M67.332]  Unspecified sprain of left wrist, initial encounter [S60.476G]    SUBJECTIVE  Pain Level (0-10 scale): 4/10  Any medication changes, allergies to medications, adverse drug reactions, diagnosis change, or new procedure performed?: [x] No    [] Yes (see summary sheet for update)  Subjective functional status/changes:   [] No changes reported  It just started out of the blue  It hurts at night sometiems and wakes me up, achy, burning, no numbness    OBJECTIVE    Modality rationale: decrease pain and increase tissue extensibility to improve the patients ability to grasp   Min Type Additional Details    [] Estim:  []Unatt       []IFC  []Premod                        []Other:  []w/ice   []w/heat  Position:  Location:    [] Estim: []Att    []TENS instruct  []NMES                    []Other:  []w/US   []w/ice   []w/heat  Position:  Location:    []  Traction: [] Cervical       []Lumbar                       [] Prone          []Supine                       []Intermittent   []Continuous Lbs:  [] before manual  [] after manual    []  Ultrasound: []Continuous   [] Pulsed                           []1MHz   []3MHz W/cm2:  Location:    []  Iontophoresis with dexamethasone         Location: [] Take home patch   [] In clinic    []  Ice     []  heat  []  Ice massage  []  Laser   []  Anodyne Position:  Location:     10 []  Laser with stim  [x]  Other: Paraffin Position:  Location:L hand    []  Vasopneumatic Device Pressure:       [] lo [] med [] hi   Temperature: [] lo [] med [] hi   [x] Skin assessment post-treatment:  [x]intact []redness- no adverse reaction    []redness  adverse reaction:     10 min Therapeutic Exercise:  [] See flow sheet :   Rationale: increase ROM to improve the patients ability to grasp  Wrist HEP 10 each      With   [x] TE   [] TA   [] neuro   [] other: Patient Education: [x] Review HEP    [] Progressed/Changed HEP based on: wrist HEP  [] positioning   [] body mechanics   [] transfers   [] heat/ice application   [] Splint wear/care   [] Sensory re-education   [] scar management      [] other:             Other Objective/Functional Measures:   Subjective: pt is a right hand dominant, 63 y.o.y/o, female who has had 3 month history of pain in L wrist on volar side and distal radioulnar joint, has had ibuprofen. Prior level of function: Work in [employer]a and [employer], reading, community meetings  Pain level:(0-no pain 10-debilitating pain) moderate    Description/Location: left wrist with c/o minimal relief   Worst pain10/10 Least pain 3/10   Activities which aggravate pain: lifting, pulling, pushing up to stand   Activities which ease pain: heat  Current functional limitations/living situation: son with [employer] Dent lives with her,     Medical hx: arthritis in knees, Hep c, glaucoma    Medications: See the written copy of this report in the patient's paper medical record.        Objective:    Range of Motion:     Active Passive     Norms Right Left Right Left   Shoulder Flex 0-180        Ext 0-60        abd 0-180        Horizontal add 0-45        IR 0-70        ER 0-90       Elbow Ext/flex 0-150       Forearm Supination 0-80        Pronation 0-80       Wrist Flex 0-80 72 20      Ext 0-70 70 35      Ulnar Dev 0-30 30 20      Radial Dev 0-20 25 5         Hand ROM WNL  Hand Strength:   Gross Grasp 3pt Pinch Lateral Pinch Tip Pinch   Right  57      Left 16 6 Pain        Finger Opposition:WNL    ADLs I all self and home care, uses R more  Pain Level (0-10 scale) post treatment: 3/10    ASSESSMENT/Changes in Function:      X_  See Plan of Care  []  See progress note/recertification  []  See Discharge Summary             PLAN  [x]  Upgrade activities as tolerated     [x]  Continue plan of care  []  Update interventions per flow sheet       []  Discharge due to:_  []  Other:_      Elba Gutierrez OTR/L 10/30/2017  10:07 AM    Future Appointments  Date Time Provider Brady Glynn   11/8/2017 10:50 AM Simin Franco MD [facility]   12/19/2017 11:00 AM Emily Huerta NP [address] [facility]

## 2017-10-30 NOTE — PROGRESS NOTES
[facility]  [employer] [facility]  [address] [place] [address]  [phone] fax    Plan of Care/Statement of Necessity for Occupational Therapy Services    Patient name: Gaurav Braun Start of Care: 10/30/2017   Referral source: Romana Sans : 1955    Medical Diagnosis: Pain in left wrist [M25.532]  Transient synovitis, left wrist [M39.975]  Unspecified sprain of left wrist, initial encounter [S66.502J]   Onset Date:3 months    Treatment Diagnosis: Pain L wrist   Prior Hospitalization: see medical history Provider#: [id number]   Medications: Verified on Patient summary List    Comorbidities: Hep C, glaucoma, arthritis in knees, prior history fo narcotic abuse (clean for >10 years)   Prior Level of Function: 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 Assessment/ key information: 63year old RHD female with onset of L wrist pain unrelated to injury. She reports pain of 4/10 with occasional pain at night which is aching and burning, as well as pain with lifting and pulling. She is point tender at snuff box. She has limited wrist AROM of flexion 20, extension 35, ulnar deviation 20, radial deviation 5. Her  is 16# and painful. Pinches are also painful. for lateral pinch. She can oppose all digits and has normal AROM for grasp. She reports difficulty with lifting, pulling, self care tasks, using L hand for home care, opening jars, etc.  Her FOTO is 39/100 reflecting severe impairment in function. She will benefit from skilled occupational therapy to improve l wrist AROM, reduce pain and improve function.         Evaluation Complexity: History LOW Complexity : Brief history review  Examination LOW Complexity : 1-3 performance deficits relating to physical, cognitive , or psychosocial skils that result in activity limitations and / or participation restrictions  Clinical Decision Making LOW Complexity : No comorbidities that affect functional and no verbal or physical assistance needed to complete eval tasks   Overall Complexity Rating: LOW     Problem List: Pain effecting function, Decreased range of motion, Decreased strength, Decreased ADL/functional abilities  and Decreased activity tolerance     Treatment Plan may include any combination of the following: Therapeutic exercise, Therapeutic activities, Physical agent/modality, Manual therapy, Splinting/orthoses, Patient education and ADLs/IADLs    Patient / Family readiness to learn indicated by: asking questions, trying to perform skills and interest    Persons(s) to be included in education:   patient (P)    Barriers to Learning/Limitations: None    Patient Goal (s): pain relief    Patient Self Reported Health Status: fair    Rehabilitation Potential: good    Short Term Goals: To be accomplished in 2 weeks:  1. Patient will be independent in home exercise program for wrist AROM. 2.  Patient will report pain decreased to 0-2/10 with use of modalities in clinic. 3.  Patient will  Be able to move wrist through all planes without pain. Long Term Goals: To be accomplished in 10 treatments:   1. Patient will improve l wrist AROM at least 40 degrees in flexion-extension for positioning for home care tasks. 2.  Patient will increase L hand  at least 20# for home care tasks. 3.  Patient will achieve at least 10# in all prehension patterns without pain for opening packages. 4.  Patient will report improved performance in self and home care as shown by increase in FOTO of at least 20 points.       Frequency / Duration: Patient to be seen 2 times per week for 10 treatments:    Patient/ Caregiver education and instruction: Diagnosis, prognosis, self care, activity modification, brace/ splint application and exercises   [x]  Plan of care has been reviewed with ANTOLIN Beltran 10/30/2017 1:44 PM    _____________________________________________________________________    I certify that the above Therapy Services are being furnished while the patient is under my care. I agree with the treatment plan and certify that this therapy is necessary.     Physician's Signature:____________________  Date:____________Time:__________    Please sign and return to [facility]  [address] [place] [address]  [phone] fax

## 2017-10-31 ENCOUNTER — HOSPITAL ENCOUNTER (OUTPATIENT)
Dept: PHYSICAL THERAPY | Age: 62
Discharge: HOME | End: 2017-10-31
Payer: SELF-PAY

## 2017-10-31 PROCEDURE — 97110 THERAPEUTIC EXERCISES: CPT

## 2017-10-31 PROCEDURE — 97530 THERAPEUTIC ACTIVITIES: CPT

## 2017-10-31 PROCEDURE — 97018 PARAFFIN BATH THERAPY: CPT

## 2017-10-31 NOTE — PROGRESS NOTES
OT DAILY TREATMENT NOTE  3-16    Patient Name: Edwardo Ortega  Date:10/31/2017  : 1955  [x]  Patient  Verified  Payor: SELF PAY / Plan: [facility] SELF PAY / Product Type: Self Pay /    In time:228  Out time:303  Total Treatment Time (min): 35  Visit #: 2 of 10    Treatment Area: Pain in left wrist [M25.532]  Transient synovitis, left wrist [M67.332]  Unspecified sprain of left wrist, initial encounter [W54.259N]    SUBJECTIVE  Pain Level (0-10 scale): /10  Any medication changes, allergies to medications, adverse drug reactions, diagnosis change, or new procedure performed?: [x] No    [] Yes (see summary sheet for update)  Subjective functional status/changes:   [] No changes reported  Patient said that she must have hurt her hand when she took a nap without her wrist brace because she woke up and was in pain. She said her wrist feels better now that she is moving better.     OBJECTIVE    Modality rationale: decrease pain and increase tissue extensibility to improve the patients ability to increase ROM in L wrist   Min Type Additional Details    [] Estim:  []Unatt       []IFC  []Premod                        []Other:  []w/ice   []w/heat  Position:  Location:    [] Estim: []Att    []TENS instruct  []NMES                    []Other:  []w/US   []w/ice   []w/heat  Position:  Location:    []  Traction: [] Cervical       []Lumbar                       [] Prone          []Supine                       []Intermittent   []Continuous Lbs:  [] before manual  [] after manual    []  Ultrasound: []Continuous   [] Pulsed                           []1MHz   []3MHz W/cm2:  Location:    []  Iontophoresis with dexamethasone         Location: [] Take home patch   [] In clinic    []  Ice     []  heat  []  Ice massage  []  Laser   []  Anodyne Position:  Location:     10 []  Laser with stim  [x]  Other: Paraffin Position:  Location:Left hand    []  Vasopneumatic Device Pressure:       [] lo [] med [] hi   Temperature: [] lo [] med [] hi   [x] Skin assessment post-treatment:  [x]intact []redness- no adverse reaction    []redness  adverse reaction:     15 min Therapeutic Exercise:  [] See flow sheet :   Rationale: increase ROM and improve coordination to improve the patients ability to perform   Self care and household tasks  Wrist maze- 5 each diff/easy  HEP review- wrist flex/ext with/without fist, radial/unlar deviation, towel scrunch x5 each     10 min Therapeutic Activity:  []  See flow sheet :   Rationale: increase ROM and improve coordination  to improve the patients ability to flex/ext   L wrist  Pegboard-1/4 inch, Palm to digit, 40 pegs         With   [x] TE   [x] TA   [] neuro   [] other: Patient Education: [x] Review HEP  For proper form for follow through  [] Progressed/Changed HEP based on:   [] positioning   [] body mechanics   [] transfers   [] heat/ice application   [x] Splint wear/care   [] Sensory re-education   [] scar management      [x] other: Instructed patient to wear brace to sleep to prevent injury            Other Objective/Functional Measures:   tolerated the wrist maze. Required a break        Pain Level (0-10 scale) post treatment: 0/10    ASSESSMENT/Changes in Function:   Improving  ROM in L wrist     Patient will continue to benefit from skilled OT services to modify and progress therapeutic interventions, address ROM deficits, address strength deficits, analyze and cue movement patterns and instruct in home and community integration to attain remaining goals. []  See Plan of Care  []  See progress note/recertification  []  See Discharge Summary         Progress towards goals / Updated goals:  Short Term Goals: To be accomplished in 2 weeks:  1. Patient will be independent in home exercise program for wrist AROM. Met 10/31/17  2. Patient will report pain decreased to 0-2/10 with use of modalities in clinic. Met 10/31/17  3. Patient will  Be able to move wrist through all planes without pain. Progressing toward to goal     Long Term Goals: To be accomplished in 10 treatments:                        1.  Patient will improve l wrist AROM at least 40 degrees in flexion-extension for positioning for home care tasks. 2.  Patient will increase L hand  at least 20# for home care tasks. 3.  Patient will achieve at least 10# in all prehension patterns without pain for opening packages. 4.  Patient will report improved performance in self and home care as shown by increase in FOTO of at least 20 points.      PLAN  [x]  Upgrade activities as tolerated     [x]  Continue plan of care  []  Update interventions per flow sheet       []  Discharge due to:_  []  Other:_      Luz Pascaljorge THOMAS 10/31/2017  2:55 PM    Future Appointments  Date Time Provider Brady Glynn   11/2/2017 2:00 PM Sal Chacon [id number]   11/7/2017 9:30 AM Kasie Duong, OTR/L [facility]   11/8/2017 10:50 AM Justen Carias MD [facility] 69   11/10/2017 9:00 AM Jarrod Sanchez [facility]   11/14/2017 10:00 AM Kasie Duong OTR/L [facility]   11/17/2017 9:00 AM Kasie Duong OTR/L [facility]   12/19/2017 11:00 AM Salvador Alonso NP [address] [facility]

## 2017-11-02 ENCOUNTER — APPOINTMENT (OUTPATIENT)
Dept: PHYSICAL THERAPY | Age: 62
End: 2017-11-02
Payer: SELF-PAY

## 2017-11-07 ENCOUNTER — HOSPITAL ENCOUNTER (OUTPATIENT)
Dept: PHYSICAL THERAPY | Age: 62
Discharge: HOME | End: 2017-11-07
Payer: SELF-PAY

## 2017-11-07 PROCEDURE — 97018 PARAFFIN BATH THERAPY: CPT

## 2017-11-07 PROCEDURE — 97110 THERAPEUTIC EXERCISES: CPT

## 2017-11-07 PROCEDURE — 97530 THERAPEUTIC ACTIVITIES: CPT

## 2017-11-07 NOTE — PROGRESS NOTES
[facility] Yane Broom  [address] [facility]  [phone] fax    Occupational Therapy Progress Note  Patient name: Catalina Santana Start of Care: 10/30/17   Referral source: Terrell Srivastava,* : 1955   Medical/Treatment Diagnosis: Pain in left wrist [M25.532]  Transient synovitis, left wrist [H30.955]  Unspecified sprain of left wrist, initial encounter [I29.745G] Onset Date:3 months     Prior Hospitalization: see medical history Provider#: [id number]   Medications: Verified on Patient Summary List    Comorbidities: Hep C, glaucoma, arthritis knees, prior history of narcotic abuse,   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 Issued thumb spica orthosis to reduce strain on thumb/wrist.    Prior goals and progress:  1.  Patient will be independent in home exercise program for wrist AROM. Met   2.  Patient will report pain decreased to 0-2/10 with use of modalities in clinic. Met   3.  Patient will  Be able to move wrist through all planes without pain. Progressing toward to goal       Long Term Goals: To be accomplished in 10 treatments:                        1.  Patient will improve l wrist AROM at least 40 degrees in flexion-extension for positioning for home care tasks. Progressing toward goal   2.  Patient will increase L hand  at least 20# for home care tasks. Not reassessed limited visits  3.  Patient will achieve at least 10# in all prehension patterns without pain for opening packages. Progressing toward goal 11//17  4.  Patient will report improved performance in self and home care as shown by increase in FOTO of at least 20 points. Not reassessed limited visits    Updated Goals: to be achieved in 7 visits:  Short Term Goal continued:  3.  Patient will  Be able to move wrist through all planes without pain.       Long Term Goals: To be accomplished in 10 treatments:                        1.  Patient will improve l wrist AROM at least 40 degrees in flexion-extension for positioning for home care tasks. 2.  Patient will increase L hand  at least 20# for home care tasks. 3.  Patient will achieve at least 10# in all prehension patterns without pain for opening packages. 4.  Patient will report improved performance in self and home care as shown by increase in FOTO of at least 20 points. ASSESSMENT/RECOMMENDATIONS:  [x]Continue therapy per initial plan/protocol at a frequency of  2 x per week for 7 visits  []Continue therapy with the following recommended changes:_____________________      _____________________________________________________________________  []Discontinue therapy progressing towards or have reached established goals  []Discontinue therapy due to lack of appreciable progress towards goals  []Discontinue therapy due to lack of attendance or compliance  []Await Physician's recommendations/decisions regarding therapy  []Other:________________________________________________________________    Thank you for this referral.   NYDIA Jarvis/L 11/7/2017 12:00 PM  NOTE TO PHYSICIAN:  PLEASE COMPLETE THE ORDERS BELOW AND   FAX TO [facility]: [phone]  If you are unable to process this request in 24 hours please contact our office: [phone] I have read the above report and request that my patient continue as recommended.   ? I have read the above report and request that my patient continue therapy with the following changes/special instructions:________________________________________________________  ? I have read the above report and request that my patient be discharged from therapy.     Physicians signature: ________________________________Date: _____Time:_____

## 2017-11-07 NOTE — PROGRESS NOTES
OT DAILY TREATMENT NOTE  3-16    Patient Name: Iveth Cross  Date:2017  : 1955  [x]  Patient  Verified  Payor: SELF PAY / Plan: [facility] SELF PAY / Product Type: Self Pay /    In time:934  Out time:1010  Total Treatment Time (min): 36  Visit #: 3 of 10    Treatment Area: Pain in left wrist [M25.532]  Transient synovitis, left wrist [M67.332]  Unspecified sprain of left wrist, initial encounter [S69.236A]    SUBJECTIVE  Pain Level (0-10 scale): 5/10  Any medication changes, allergies to medications, adverse drug reactions, diagnosis change, or new procedure performed?: [x] No    [] Yes (see summary sheet for update)  Subjective functional status/changes:   [] No changes reported  Patient said her arm feels weak and she is frustrated that there are things she still can't do. Putting on spray deodorant is one thing that she tried and she couldn't press the applicator down.  While completing wrist maze exercises she said that she had pain on the ulnar side and middle of her wrist.    OBJECTIVE    Modality rationale: decrease edema, decrease pain and increase tissue extensibility to improve the patients ability to increase ROM of L wrist   Min Type Additional Details    [] Estim:  []Unatt       []IFC  []Premod                        []Other:  []w/ice   []w/heat  Position:  Location:    [] Estim: []Att    []TENS instruct  []NMES                    []Other:  []w/US   []w/ice   []w/heat  Position:  Location:    []  Traction: [] Cervical       []Lumbar                       [] Prone          []Supine                       []Intermittent   []Continuous Lbs:  [] before manual  [] after manual    []  Ultrasound: []Continuous   [] Pulsed                           []1MHz   []3MHz W/cm2:  Location:    []  Iontophoresis with dexamethasone         Location: [] Take home patch   [] In clinic    []  Ice     []  heat  []  Ice massage  []  Laser   []  Anodyne Position:  Location:     10 []  Laser with stim  [] Other: Paraffin Position:  Location:L hand    []  Vasopneumatic Device Pressure:       [] lo [] med [] hi   Temperature: [] lo [] med [] hi   [x] Skin assessment post-treatment:  [x]intact []redness- no adverse reaction    []redness  adverse reaction:     10 min Therapeutic Exercise:  [] See flow sheet :   Rationale: increase ROM, increase strength and improve coordination to improve the patients ability to complete   Self care and household tasks using L wrist  Wrist maze- 10x difficult    16 min Therapeutic Activity:  []  See flow sheet :   Rationale: increase ROM, increase strength and improve coordination  to improve the patients ability to complete   Self care and household tasks using left wrist  Pegboard activity- 36 pegs palm to digit; 2 pt tip for removal  Fit and provided thumb/wrist splint         With   [x] TE   [x] TA   [] neuro   [x] other: Patient Education: [x] Review HEP    [] Progressed/Changed HEP based on:   [] positioning   [] body mechanics   [] transfers   [] heat/ice application   [] Splint wear/care   [] Sensory re-education   [] scar management      [x] other: Provided new wrist/thumb splint to help reduce pain            Other Objective/Functional Measures:   Wrist maze- difficulty with ulnar deviation     Pain Level (0-10 scale) post treatment: 2/10    ASSESSMENT/Changes in Function: improving ROM of left wrist    Patient will continue to benefit from skilled OT services to modify and progress therapeutic interventions, address ROM deficits, address strength deficits, analyze and cue movement patterns and instruct in home and community integration to attain remaining goals. []  See Plan of Care  []  See progress note/recertification  []  See Discharge Summary         Progress towards goals / Updated goals:  Short Term Goals: To be accomplished in 2 weeks:  1.  Patient will be independent in home exercise program for wrist AROM. Met 10/31/17  2.  Patient will report pain decreased to 0-2/10 with use of modalities in clinic. Met 10/31/17  3.  Patient will  Be able to move wrist through all planes without pain. Progressing toward to goal       Long Term Goals: To be accomplished in 10 treatments:                        1.  Patient will improve l wrist AROM at least 40 degrees in flexion-extension for positioning for home care tasks. Progressing toward goal 11/7/17  2.  Patient will increase L hand  at least 20# for home care tasks. 3.  Patient will achieve at least 10# in all prehension patterns without pain for opening packages. Progressing toward goal 11//17  4.  Patient will report improved performance in self and home care as shown by increase in FOTO of at least 20 points.      PLAN  [x]  Upgrade activities as tolerated     [x]  Continue plan of care  []  Update interventions per flow sheet       []  Discharge due to:_  []  Other:_      Akin Necessary OTAS 11/7/2017  9:52 AM    Future Appointments  Date Time Provider Brady Powelli   11[facility]8/2017 10:50 [facility]   11/10/2017 9:00 AM Royce Sanchez [facility]   11/14/2017 10:00 AM Karla Meeks, OTR/L [facility]   11/17/2017 9:00 AM Karla Meeks OTR/L [facility]   12/19/2017 11:00 AM Parminder Askew NP [address] [facility]

## 2017-11-09 ENCOUNTER — HOSPITAL ENCOUNTER (OUTPATIENT)
Dept: PHYSICAL THERAPY | Age: 62
Discharge: HOME | End: 2017-11-09
Payer: SELF-PAY

## 2017-11-09 PROCEDURE — 97035 APP MDLTY 1+ULTRASOUND EA 15: CPT

## 2017-11-09 PROCEDURE — 97018 PARAFFIN BATH THERAPY: CPT

## 2017-11-09 PROCEDURE — 97110 THERAPEUTIC EXERCISES: CPT

## 2017-11-09 NOTE — PROGRESS NOTES
OT DAILY TREATMENT NOTE  3-16    Patient Name: Beverlyn Kehr  Date:2017  : 1955  [x]  Patient  Verified  Payor: SELF PAY / Plan: [facility] SELF PAY / Product Type: Self Pay /    In time:1140  Out time:1210  Total Treatment Time (min): 30  Visit #: 4 of 10    Treatment Area: Pain in left wrist [M25.532]  Transient synovitis, left wrist [M67.332]  Unspecified sprain of left wrist, initial encounter [S62.466A]    SUBJECTIVE  Pain Level (0-10 scale): 0/10  Any medication changes, allergies to medications, adverse drug reactions, diagnosis change, or new procedure performed?: [x] No    [] Yes (see summary sheet for update)  Subjective functional status/changes:   [] No changes reported  Patient reported that her wrist feels better now since she has the new brace. She said her pain is more noticeable when she lifts objects or bumps her hand. She said she is requesting a paraffin bath from her daughter for luis fernando.      OBJECTIVE    Modality rationale: decrease edema, decrease pain and increase tissue extensibility to improve the patients ability to increase ROM in L wrist   Min Type Additional Details    [] Estim:  []Unatt       []IFC  []Premod                        []Other:  []w/ice   []w/heat  Position:  Location:    [] Estim: []Att    []TENS instruct  []NMES                    []Other:  []w/US   []w/ice   []w/heat  Position:  Location:    []  Traction: [] Cervical       []Lumbar                       [] Prone          []Supine                       []Intermittent   []Continuous Lbs:  [] before manual  [] after manual   8 [x]  Ultrasound: []Continuous   [x] Tvvjcp18%                         []1MHz   [x]3MHz W/cm2:1.2  Location: snuff box radial volar L wrist    []  Iontophoresis with dexamethasone         Location: [] Take home patch   [] In clinic    []  Ice     []  heat  []  Ice massage  []  Laser   []  Anodyne Position:  Location:     10 []  Laser with stim  [x]  Other: Paraffin Position:  Location:L hand    []  Vasopneumatic Device Pressure:       [] lo [] med [] hi   Temperature: [] lo [] med [] hi   [x] Skin assessment post-treatment:  [x]intact []redness- no adverse reaction    []redness  adverse reaction:     12 min Therapeutic Exercise:  [] See flow sheet :   Rationale: increase ROM, increase strength and improve coordination to improve the patients ability to complete   Self care and household tasks  Wrist maze- 10x difficult L hand        With   [] TE   [] TA   [] neuro   [] other: Patient Education: [x] Review HEP    [] Progressed/Changed HEP based on:   [] positioning   [] body mechanics   [] transfers   [] heat/ice application   [] Splint wear/care   [] Sensory re-education   [] scar management      [] other:             Other Objective/Functional Measures:   Able to complete all exercises without increased pain. Pain Level (0-10 scale) post treatment: 0/10    ASSESSMENT/Changes in Function: improving ROM in left wrist. No longer hesitates with ulnar deviation. Patient will continue to benefit from skilled OT services to modify and progress therapeutic interventions, address functional mobility deficits, address ROM deficits, address strength deficits and instruct in home and community integration to attain remaining goals. []  See Plan of Care  []  See progress note/recertification  []  See Discharge Summary         Progress towards goals / Updated goals:  Long Term Goals: To be accomplished in 10 treatments:                        1.  Patient will improve l wrist AROM at least 40 degrees in flexion-extension for positioning for home care tasks. Progressing toward goal   2.  Patient will increase L hand  at least 20# for home care tasks. Not reassessed limited visits  3.  Patient will achieve at least 10# in all prehension patterns without pain for opening packages.  Progressing toward goal 11//17  4.  Patient will report improved performance in self and home care as shown by increase in FOTO of at least 20 points. Not reassessed limited visits     Updated Goals: to be achieved in 7 visits:  Short Term Goal continued:  3.  Patient will  Be able to move wrist through all planes without pain.   Met 11/9/17    PLAN  [x]  Upgrade activities as tolerated     [x]  Continue plan of care  []  Update interventions per flow sheet       []  Discharge due to:_  []  Other:_      Sarah THOMAS 11/9/2017  12:39 PM    Future Appointments  Date Time Provider Brady Glynn   11/14/2017 10:00 AM Lia Olson, OTR/L [facility]   11/17/2017 9:00 AM Lia Olson OTR/L [facility]   12/19/2017 11:00 AM Mukesh Cazares NP [address] [facility]

## 2017-11-10 ENCOUNTER — HOSPITAL ENCOUNTER (OUTPATIENT)
Dept: PHYSICAL THERAPY | Age: 62
End: 2017-11-10
Payer: SELF-PAY

## 2017-11-13 ENCOUNTER — TELEPHONE (OUTPATIENT)
Dept: FAMILY MEDICINE CLINIC | Age: 62
End: 2017-11-13

## 2017-11-13 NOTE — TELEPHONE ENCOUNTER
Medication: Dexilant 30 mg  , dose: 1 tab, how often: every day as needed , current number of medication days provided: 90, refill per application. Lot #: [id number], EXP.11/18. This medication was received and verified for the following 1. Correct Patient, 2. Correct Diagnosis, 3. Correct Drug, 4. Correct route, and no current allergy to medication. Medication: Benicar HCT 40-25  mg, dose: 1 tab, how often: daily, current number of medication days provided: 90, refill per application. Lot # [id number], EXP.05/19. This medication was received and verified for the following 1. Correct Patient, 2. Correct Diagnosis, 3. Correct Drug, 4. Correct route, and no current allergy to medication. Please contact patient to come  their medications.      Dorann Phalen, MSN, RN, FNP-C     [facility] [place]

## 2017-11-14 ENCOUNTER — HOSPITAL ENCOUNTER (OUTPATIENT)
Dept: PHYSICAL THERAPY | Age: 62
Discharge: HOME | End: 2017-11-14
Payer: SELF-PAY

## 2017-11-14 PROCEDURE — 97110 THERAPEUTIC EXERCISES: CPT

## 2017-11-14 PROCEDURE — 97018 PARAFFIN BATH THERAPY: CPT

## 2017-11-14 PROCEDURE — 97035 APP MDLTY 1+ULTRASOUND EA 15: CPT

## 2017-11-14 NOTE — PROGRESS NOTES
OT DAILY TREATMENT NOTE - [facility]     Patient Name: Catalina Santana  Date:2017  : 1955  [x]  Patient  Verified  Payor: SELF PAY / Plan: [facility] SELF PAY / Product Type: Self Pay /    In time:957  Out time:1040  Total Treatment Time (min): 43    Visit #: 5 of 10    Treatment Area: Pain in left wrist [M25.532]  Transient synovitis, left wrist [M67.332]  Unspecified sprain of left wrist, initial encounter [S61.430A]    SUBJECTIVE  Pain Level (0-10 scale): 0/10  Any medication changes, allergies to medications, adverse drug reactions, diagnosis change, or new procedure performed?: [x] No    [] Yes (see summary sheet for update)  Subjective functional status/changes:   [] No changes reported  A little sore and pins and needles here ( volar ulnar side wrist)  Feels much better  I forgot my deoderant to show you    OBJECTIVE    Modality rationale: decrease inflammation and decrease pain to improve the patients ability to grasp, lift   Min Type Additional Details    [] Estim:  []Unatt       []IFC  []Premod                        []Other:  []w/ice   []w/heat  Position:  Location:    [] Estim: []Att    []TENS instruct  []NMES                    []Other:  []w/US   []w/ice   []w/heat  Position:  Location:    []  Traction: [] Cervical       []Lumbar                       [] Prone          []Supine                       []Intermittent   []Continuous Lbs:  [] before manual  [] after manual   8 [x]  Ultrasound: []Continuous   [x] Zixyth28%                         []1MHz   [x]3MHz W/cm2:1.0  Location:volar ulnar side wrist    []  Iontophoresis with dexamethasone         Location: [] Take home patch   [] In clinic    []  Ice     []  heat  []  Ice massage  []  Laser   []  Anodyne Position:  Location:     10 []  Laser with stim  [x]  Other: Paraffin Position:  Location:L hand    []  Vasopneumatic Device Pressure:       [] lo [] med [] hi   Temperature: [] lo [] med [] hi   [x] Skin assessment post-treatment: [x]intact []redness- no adverse reaction    []redness  adverse reaction:     25 min Therapeutic Exercise:  [] See flow sheet :   Rationale: increase ROM and increase strength to improve the patients ability to grasp, move wrist without pain   Wrist maze difficult x 10 L  towel scrunch x 10 L   Graded clothespins 4,6 pound 10 each with all pinches  Recheck to assess progress in ROM        With   [] TE   [] TA   [] neuro   [] other: Patient Education: [x] Review HEP    [] Progressed/Changed HEP based on: progress made  [] positioning   [] body mechanics   [] transfers   [] heat/ice application   [] Splint wear/care   [] Sensory re-education   [] scar management      [] other:            Other Objective/Functional Measures:   Current measures listed belwo with prior in ( )  Wrist Flex  40  (20)  Ext   50  (35)    Pain Level (0-10 scale) post treatment: 0/10    ASSESSMENT/Changes in Function: Improving ROM    Patient will continue to benefit from skilled OT services to modify and progress therapeutic interventions, address ROM deficits, address strength deficits, analyze and address soft tissue restrictions, analyze and cue movement patterns and instruct in home and community integration to attain remaining goals. []  See Plan of Care  []  See progress note/recertification  []  See Discharge Summary         Progress towards goals / Updated goals:  *Long Term Goals: To be accomplished in 10 treatments:                        1.  Patient will improve l wrist AROM at least 40 degrees in flexion-extension for positioning for home care tasks. Progressing toward goal increased 35 11/14/17  2.  Patient will increase L hand  at least 20# for home care tasks. Not reassessed limited visits  3.  Patient will achieve at least 10# in all prehension patterns without pain for opening packages.  Progressing toward goal 11/14/17  4.  Patient will report improved performance in self and home care as shown by increase in [place] of at least 20 points. Not reassessed limited visits      Updated Goals: to be achieved in 7 visits:  Short Term Goal continued:  3.  Patient will  Be able to move wrist through all planes without pain.   Met 11/9/17**    PLAN  [x]  Upgrade activities as tolerated     [x]  Continue plan of care  []  Update interventions per flow sheet       []  Discharge due to:_  []  Other:_      ANTOLIN Lomas 11/14/2017  10:57 AM    Future Appointments  Date Time Provider Brady Powelli   11/17/2017 9:00 AM NYDIA Lomas/ELIANA [facility]   12/19/2017 11:00 AM Colleen Judd, NP [address] [facility]

## 2017-11-17 ENCOUNTER — HOSPITAL ENCOUNTER (OUTPATIENT)
Dept: PHYSICAL THERAPY | Age: 62
Discharge: HOME | End: 2017-11-17
Payer: SELF-PAY

## 2017-11-17 PROCEDURE — 97018 PARAFFIN BATH THERAPY: CPT

## 2017-11-17 PROCEDURE — 97110 THERAPEUTIC EXERCISES: CPT

## 2017-11-17 PROCEDURE — 97035 APP MDLTY 1+ULTRASOUND EA 15: CPT

## 2017-11-17 NOTE — PROGRESS NOTES
OT DAILY TREATMENT NOTE  3    Patient Name: Thong Wallace  Date:2017  : 1955  [x]  Patient  Verified  Payor: SELF PAY / Plan: [facility] SELF PAY / Product Type: Self Pay /    In time:900  Out time:940  Total Treatment Time (min): 40  Visit #: 6 of 10    Treatment Area: Pain in left wrist [M25.532]  Transient synovitis, left wrist [M67.332]  Unspecified sprain of left wrist, initial encounter [S67.218N]    SUBJECTIVE  Pain Level (0-10 scale): 3/10  Any medication changes, allergies to medications, adverse drug reactions, diagnosis change, or new procedure performed?: [x] No    [] Yes (see summary sheet for update)  Subjective functional status/changes:   [] No changes reported  Patient reported increased pain level due to getting bumped in her hand by a child at work.      OBJECTIVE    Modality rationale: decrease edema, decrease inflammation, decrease pain and increase tissue extensibility to improve the patients ability to increase pinch strength in L hand   Min Type Additional Details    [] Estim:  []Unatt       []IFC  []Premod                        []Other:  []w/ice   []w/heat  Position:  Location:    [] Estim: []Att    []TENS instruct  []NMES                    []Other:  []w/US   []w/ice   []w/heat  Position:  Location:    []  Traction: [] Cervical       []Lumbar                       [] Prone          []Supine                       []Intermittent   []Continuous Lbs:  [] before manual  [] after manual   8 [x]  Ultrasound: []Continuous   [] Pulsed                           []1MHz   [x]3MHz W/cm2:1.0 at 50%  Location: L wrist    []  Iontophoresis with dexamethasone         Location: [] Take home patch   [] In clinic    []  Ice     []  heat  []  Ice massage  []  Laser   []  Anodyne Position:  Location:     10 []  Laser with stim  [x]  Other: Paraffin Position:  Location:L hand    []  Vasopneumatic Device Pressure:       [] lo [] med [] hi   Temperature: [] lo [] med [] hi   [x] Skin assessment post-treatment:  [x]intact []redness- no adverse reaction    []redness  adverse reaction:     22 min Therapeutic Exercise:  [] See flow sheet :   Rationale: increase strength and improve coordination to improve the patients ability to pinch with increased strength in all pinch prehensions  Putty exercises-10x each all pinch prehensions with L hand soft putty for HEP          With   [] TE   [] TA   [] neuro   [] other: Patient Education: [x] Review HEP    [] Progressed/Changed HEP based on:   [] positioning   [] body mechanics   [] transfers   [] heat/ice application   [] Splint wear/care   [] Sensory re-education   [] scar management      [x] other: Provided handout and soft putty for pinch putty HEP             Other Objective/Functional Measures:   Able to complete all putty exercises without difficulty using L hand     Pain Level (0-10 scale) post treatment: 0/10    ASSESSMENT/Changes in Function: improving pinch strength in all pinch prehensions in L hand    Patient will continue to benefit from skilled OT services to modify and progress therapeutic interventions, address strength deficits, analyze and cue movement patterns and instruct in home and community integration to attain remaining goals. []  See Plan of Care  []  See progress note/recertification  []  See Discharge Summary         Progress towards goals / Updated goals:  Long Term Goals: To be accomplished in 10 treatments:                        1.  Patient will improve l wrist AROM at least 40 degrees in flexion-extension for positioning for home care tasks. Progressing toward goal increased 35 11/14/17  2.  Patient will increase L hand  at least 20# for home care tasks. Not reassessed limited visits  3.  Patient will achieve at least 10# in all prehension patterns without pain for opening packages.  Progressing toward goal 11/17/17  4.  Patient will report improved performance in self and home care as shown by increase in [place] of at least 20 points.  Not reassessed limited visits      Updated Goals: to be achieved in 7 visits:  Short Term Goal continued:  3.  Patient will  Be able to move wrist through all planes without pain.  Met 11/9/17    PLAN  [x]  Upgrade activities as tolerated     [x]  Continue plan of care  []  Update interventions per flow sheet       []  Discharge due to:_  []  Other:_      Pradeep Meckel 11/17/2017  9:30 AM    Future Appointments  Date Time Provider Brady Glynn   11/21/2017 11:00 AM Devon Ovalles, OTR/L [facility]   11/22/2017 3:00 PM Gaurav Marsh [facility]   11/27/2017 7:30 AM Devon Ovalles, OTR/L [facility]   11/29/2017 7:30 AM Devon Ovalles, OTR/L [facility]   12/1/2017 7:30 AM Devon Ovalles, OTR/L [facility]   12/19/2017 11:00 AM Jian Eddy NP [facility]

## 2017-11-22 ENCOUNTER — HOSPITAL ENCOUNTER (OUTPATIENT)
Dept: PHYSICAL THERAPY | Age: 62
Discharge: HOME | End: 2017-11-22
Payer: SELF-PAY

## 2017-11-22 PROCEDURE — 97110 THERAPEUTIC EXERCISES: CPT

## 2017-11-22 PROCEDURE — 97018 PARAFFIN BATH THERAPY: CPT

## 2017-11-22 PROCEDURE — 97035 APP MDLTY 1+ULTRASOUND EA 15: CPT

## 2017-11-22 NOTE — PROGRESS NOTES
OT DAILY TREATMENT NOTE - non [facility] 3-16    Patient Name: Dkaota Millan  Date:2017  : 1955  [x]  Patient  Verified  Payor: SELF PAY / Plan: [facility] SELF PAY / Product Type: Self Pay /    In time:300  Out time:335  Total Treatment Time (min): 35  Visit #: 7 of 10    Treatment Area: Pain in left wrist [M25.532]  Transient synovitis, left wrist [M67.332]  Unspecified sprain of left wrist, initial encounter [B74.863X]    SUBJECTIVE  Pain Level (0-10 scale): 0/10  Any medication changes, allergies to medications, adverse drug reactions, diagnosis change, or new procedure performed?: [x] No    [] Yes (see summary sheet for update)  Subjective functional status/changes:   [] No changes reported  I hope this hand holds up for all the cooking I have to do.      OBJECTIVE  Modality rationale: decrease pain and increase tissue extensibility to improve the patients ability to    Min Type Additional Details    [] Estim:  []Unatt       []IFC  []Premod                        []Other:  []w/ice   []w/heat  Position:  Location:    [] Estim: []Att    []TENS instruct  []NMES                    []Other:  []w/US   []w/ice   []w/heat  Position:  Location:    []  Traction: [] Cervical       []Lumbar                       [] Prone          []Supine                       []Intermittent   []Continuous Lbs:  [] before manual  [] after manual   8 [x]  Ultrasound: []Continuous   [x] Pulsed                           []1MHz   [x]3MHz Location: L wrist  W/cm2: 1.0    []  Iontophoresis with dexamethasone         Location: [] Take home patch   [] In clinic    []  Ice     []  heat  []  Ice massage  []  Laser   []  Anodyne Position:  Location:   10 []  Laser with stim  [x]  Other: Paraffin Position:  Location: L Wrist    []  Vasopneumatic Device Pressure:       [] lo [] med [] hi   Temperature: [] lo [] med [] hi   [x] Skin assessment post-treatment:  [x]intact []redness- no adverse reaction    []redness  adverse reaction: 15 min Therapeutic Exercise:  [] See flow sheet :   Rationale: increase ROM and increase strength to improve the patients ability to , pinch    Recheck: Wrist Flex/Ext  Wrist Maze: 5x  Yellow Therabar: Supination only 10x with hold      With   [] TE   [] TA   [] neuro   [] other: Patient Education: [x] Review HEP    [] Progressed/Changed HEP based on:   [] positioning   [] body mechanics   [] transfers   [] heat/ice application   [] Splint wear/care   [] Sensory re-education   [] scar management      [] other:             Other Objective/Functional Measures:     Wrist Flexion: 42 (40)   Ext:       54 (50)       Pain Level (0-10 scale) post treatment: 0/10    ASSESSMENT/Changes in Function: ROM improving    Patient will continue to benefit from skilled OT services to modify and progress therapeutic interventions, address ROM deficits, address strength deficits and instruct in home and community integration to attain remaining goals. []  See Plan of Care  []  See progress note/recertification  []  See Discharge Summary         Progress towards goals / Updated goals:  Long Term Goals: To be accomplished in 10 treatments:                        1.  Patient will improve l wrist AROM at least 40 degrees in flexion-extension for positioning for home care tasks. Progressing toward goal increased 35 11/14/17  2.  Patient will increase L hand  at least 20# for home care tasks. Not reassessed limited visits  3.  Patient will achieve at least 10# in all prehension patterns without pain for opening packages. Progressing toward goal 11/17/17  4.  Patient will report improved performance in self and home care as shown by increase in FOTO of at least 20 points.  Not reassessed limited visits      Updated Goals: to be achieved in 7 visits:  Short Term Goal continued:  3.  Patient will  Be able to move wrist through all planes without pain.  Met 11/9/17    PLAN  []  Upgrade activities as tolerated     [x]  Continue plan of care  []  Update interventions per flow sheet       []  Discharge due to:_  []  Other:_      KAN Boswell/L 11/22/2017  3:28 PM    Future Appointments  Date Time Provider Brady Glynn   11/27/2017 7:30 AM Shaina Hernández, OTR/L [facility]   11/29/2017 7:30 AM Malefaraz Hernández, OTR/L [facility]   12/1/2017 7:30 AM Malefaraz Hernández, OTR/L [facility]   12/19/2017 11:00 AM Myriam Leonardo NP [facility]

## 2017-11-29 ENCOUNTER — HOSPITAL ENCOUNTER (OUTPATIENT)
Dept: PHYSICAL THERAPY | Age: 62
Discharge: HOME | End: 2017-11-29
Payer: SELF-PAY

## 2017-11-29 PROCEDURE — 97018 PARAFFIN BATH THERAPY: CPT

## 2017-11-29 PROCEDURE — 97110 THERAPEUTIC EXERCISES: CPT

## 2017-11-29 NOTE — PROGRESS NOTES
OT DAILY TREATMENT NOTE - non [facility] 3-16    Patient Name: Jarred Diaz  Date:2017  : 1955  [x]  Patient  Verified  Payor: SELF PAY / Plan: [facility]ELF PAY / Product Type: Self Pay /    In time:930  Out time:1000  Total Treatment Time (min): 30  Visit #: 8 of 10    Treatment Area: Pain in left wrist [M25.532]  Transient synovitis, left wrist [M67.332]  Unspecified sprain of left wrist, initial encounter [J11.322Z]    SUBJECTIVE  Pain Level (0-10 scale): 0/10  Any medication changes, allergies to medications, adverse drug reactions, diagnosis change, or new procedure performed?: [x] No    [] Yes (see summary sheet for update)  Subjective functional status/changes:   [] No changes reported  I'm tired    OBJECTIVE    Modality rationale: decrease pain and increase tissue extensibility to improve the patients ability to    Min Type Additional Details    [] Estim:  []Unatt       []IFC  []Premod                        []Other:  []w/ice   []w/heat  Position:  Location:    [] Estim: []Att    []TENS instruct  []NMES                    []Other:  []w/US   []w/ice   []w/heat  Position:  Location:    []  Traction: [] Cervical       []Lumbar                       [] Prone          []Supine                       []Intermittent   []Continuous Lbs:  [] before manual  [] after manual    []  Ultrasound: []Continuous   [] Pulsed                           []1MHz   []3MHz Location:  W/cm2:    []  Iontophoresis with dexamethasone         Location: [] Take home patch   [] In clinic    []  Ice     []  heat  []  Ice massage  []  Laser   []  Anodyne Position:  Location:   10 []  Laser with stim  [x]  Other: Paraffin Position:  Location: L Wrist    []  Vasopneumatic Device Pressure:       [] lo [] med [] hi   Temperature: [] lo [] med [] hi   [x] Skin assessment post-treatment:  [x]intact []redness- no adverse reaction    []redness  adverse reaction:     20 min Therapeutic Exercise:  [] See flow sheet :   Rationale: increase ROM and increase strength to improve the patients ability to , use L Wrist    Therabar: 3 ways: 10x each  Putty Pinches: Soft 5x  HEP Review  Wrist Maze: Difficult 5x    With   [] TE   [] TA   [] neuro   [] other: Patient Education: [x] Review HEP    [] Progressed/Changed HEP based on:   [] positioning   [] body mechanics   [] transfers   [] heat/ice application   [] Splint wear/care   [] Sensory re-education   [] scar management      [] other:             Other Objective/Functional Measures:   Difficulty with ulnar deviation during wrist maze  Dull Pain noted      Pain Level (0-10 scale) post treatment: 0/10    ASSESSMENT/Changes in Function: Improving L wrist ROM, pinch prehensions and  strength    Patient will continue to benefit from skilled OT services to modify and progress therapeutic interventions, address ROM deficits, address strength deficits and instruct in home and community integration to attain remaining goals. []  See Plan of Care  []  See progress note/recertification  []  See Discharge Summary         Progress towards goals / Updated goals:  Updated Goals: to be achieved in 7 visits:  Short Term Goal continued:  3.  Patient will  Be able to move wrist through all planes without pain. Dull Pain reported on this date 11/29/17      Long Term Goals: To be accomplished in 10 treatments:                        1.  Patient will improve l wrist AROM at least 40 degrees in flexion-extension for positioning for home care tasks. 2.  Patient will increase L hand  at least 20# for home care tasks. 3.  Patient will achieve at least 10# in all prehension patterns without pain for opening packages. 4.  Patient will report improved performance in self and home care as shown by increase in FOTO of at least 20 points.      PLAN  []  Upgrade activities as tolerated     [x]  Continue plan of care  []  Update interventions per flow sheet       []  Discharge due to:_  []  Other:_ Catherene Najjar, COTA/L 11/29/2017  9:48 AM    Future Appointments  Date Time Provider Brady Gretta   12/1/2017 7:30 AM NYDIA Travis/ELIANA [facility]   12/19/2017 11:00 AM Mukesh Cazares NP [address] [facility]

## 2017-11-30 ENCOUNTER — TELEPHONE (OUTPATIENT)
Dept: FAMILY MEDICINE CLINIC | Age: 62
End: 2017-11-30

## 2017-11-30 NOTE — TELEPHONE ENCOUNTER
Medication: Nasonex, dose: 2 sprays both nostrils, how often: qd, current number of medication days provided: 90, refill per application. Lot #: [id number], EXP.02/19   . This medication was received and verified for the following 1. Correct Patient, 2. Correct Diagnosis, 3. Correct Drug, 4. Correct route, and no current allergy to medication. Please contact patient to come  their medications.      Sara Carias MSN, RN, FNP-C     [facility]

## 2017-12-01 DIAGNOSIS — I10 ESSENTIAL HYPERTENSION WITH GOAL BLOOD PRESSURE LESS THAN 140/90: ICD-10-CM

## 2017-12-04 RX ORDER — VERAPAMIL HYDROCHLORIDE 120 MG/1
TABLET, FILM COATED ORAL
Qty: 60 TAB | Refills: 4 | Status: SHIPPED | OUTPATIENT
Start: 2017-12-04 | End: 2018-04-24 | Stop reason: SDUPTHER

## 2017-12-11 ENCOUNTER — HOSPITAL ENCOUNTER (OUTPATIENT)
Dept: LAB | Age: 62
Discharge: HOME | End: 2017-12-11

## 2017-12-11 ENCOUNTER — LAB ONLY (OUTPATIENT)
Dept: FAMILY MEDICINE CLINIC | Age: 62
End: 2017-12-11

## 2017-12-11 DIAGNOSIS — I10 ESSENTIAL HYPERTENSION: ICD-10-CM

## 2017-12-11 DIAGNOSIS — I10 ESSENTIAL HYPERTENSION: Primary | ICD-10-CM

## 2017-12-11 LAB
ALBUMIN SERPL-MCNC: 2.7 G/DL (ref 3.4–5)
ALBUMIN/GLOB SERPL: 0.7 {RATIO} (ref 0.8–1.7)
ALP SERPL-CCNC: 82 U/L (ref 45–117)
ALT SERPL-CCNC: 159 U/L (ref 13–56)
ANION GAP SERPL CALC-SCNC: 4 MMOL/L (ref 3–18)
AST SERPL-CCNC: 187 U/L (ref 15–37)
BASOPHILS # BLD: 0 K/UL (ref 0–0.06)
BASOPHILS NFR BLD: 0 % (ref 0–2)
BILIRUB SERPL-MCNC: 0.7 MG/DL (ref 0.2–1)
BUN SERPL-MCNC: 18 MG/DL (ref 7–18)
BUN/CREAT SERPL: 27 (ref 12–20)
CALCIUM SERPL-MCNC: 8 MG/DL (ref 8.5–10.1)
CHLORIDE SERPL-SCNC: 109 MMOL/L (ref 100–108)
CHOLEST SERPL-MCNC: 112 MG/DL
CO2 SERPL-SCNC: 27 MMOL/L (ref 21–32)
CREAT SERPL-MCNC: 0.67 MG/DL (ref 0.6–1.3)
DIFFERENTIAL METHOD BLD: ABNORMAL
EOSINOPHIL # BLD: 0.3 K/UL (ref 0–0.4)
EOSINOPHIL NFR BLD: 6 % (ref 0–5)
ERYTHROCYTE [DISTWIDTH] IN BLOOD BY AUTOMATED COUNT: 12.8 % (ref 11.6–14.5)
GLOBULIN SER CALC-MCNC: 3.8 G/DL (ref 2–4)
GLUCOSE SERPL-MCNC: 76 MG/DL (ref 74–99)
HCT VFR BLD AUTO: 36.6 % (ref 35–45)
HDLC SERPL-MCNC: 49 MG/DL (ref 40–60)
HDLC SERPL: 2.3 {RATIO} (ref 0–5)
HGB BLD-MCNC: 12.5 G/DL (ref 12–16)
LDLC SERPL CALC-MCNC: 47.2 MG/DL (ref 0–100)
LIPID PROFILE,FLP: NORMAL
LYMPHOCYTES # BLD: 1.5 K/UL (ref 0.9–3.6)
LYMPHOCYTES NFR BLD: 32 % (ref 21–52)
MAGNESIUM SERPL-MCNC: 1.7 MG/DL (ref 1.6–2.6)
MCH RBC QN AUTO: 31.6 PG (ref 24–34)
MCHC RBC AUTO-ENTMCNC: 34.2 G/DL (ref 31–37)
MCV RBC AUTO: 92.4 FL (ref 74–97)
MONOCYTES # BLD: 0.4 K/UL (ref 0.05–1.2)
MONOCYTES NFR BLD: 8 % (ref 3–10)
NEUTS SEG # BLD: 2.5 K/UL (ref 1.8–8)
NEUTS SEG NFR BLD: 54 % (ref 40–73)
PLATELET # BLD AUTO: 125 K/UL (ref 135–420)
PMV BLD AUTO: 11.8 FL (ref 9.2–11.8)
POTASSIUM SERPL-SCNC: 4 MMOL/L (ref 3.5–5.5)
PROT SERPL-MCNC: 6.5 G/DL (ref 6.4–8.2)
RBC # BLD AUTO: 3.96 M/UL (ref 4.2–5.3)
SODIUM SERPL-SCNC: 140 MMOL/L (ref 136–145)
TRIGL SERPL-MCNC: 79 MG/DL (ref ?–150)
VLDLC SERPL CALC-MCNC: 15.8 MG/DL
WBC # BLD AUTO: 4.7 K/UL (ref 4.6–13.2)

## 2017-12-11 PROCEDURE — 85025 COMPLETE CBC W/AUTO DIFF WBC: CPT | Performed by: NURSE PRACTITIONER

## 2017-12-11 PROCEDURE — 83735 ASSAY OF MAGNESIUM: CPT | Performed by: NURSE PRACTITIONER

## 2017-12-11 PROCEDURE — 80053 COMPREHEN METABOLIC PANEL: CPT | Performed by: NURSE PRACTITIONER

## 2017-12-11 PROCEDURE — 80061 LIPID PANEL: CPT | Performed by: NURSE PRACTITIONER

## 2017-12-12 ENCOUNTER — TELEPHONE (OUTPATIENT)
Dept: FAMILY MEDICINE CLINIC | Age: 62
End: 2017-12-12

## 2017-12-15 NOTE — PROGRESS NOTES
[facility] [place]  [address] [facility]  [phone] fax    Occupational Therapy Discharge Summary    Patient name: Asad Lowe Start of Care: 10/30/17   Referral source: Jona Humphries,* : 1955   Medical/Treatment Diagnosis: Pain in left wrist [M25.532]  Transient synovitis, left wrist [T56.895]  Unspecified sprain of left wrist, initial encounter [V66.021B] Onset Date:3 months     Prior Hospitalization: see medical history Provider#: [id number]   Medications: Verified on Patient Summary List    Comorbidities: Hepatitis C, glaucoma, arthritis knees, prior history of narcotic abuse  Prior Level of Function:I self care home care driving, works at [employer]  Visits from [place] of Care: 8    Missed Visits: 4  Reporting Period : 17 to 17    Summary of Care:Patient seen for modalities, therapeutic exercises and activities to improve L wrist and hand ROM and strength and reduce pain. She has HEP. Goal:Short Term Goal continued:  3.  Patient will  Be able to move wrist through all planes without pain  Status at last note/certification:Pain with movment  Status at discharge: not met progressing, occasional dull ache    Goal:1.  Patient will improve l wrist AROM at least 40 degrees in flexion-extension for positioning for home care tasks  Status at last note/certification:See prior note  Status at discharge:  met     Adrienne Pereyra will increase L hand  at least 20# for home care tasks  Status at last note/certification: 67#  Status at discharge: not met Progressing in clinic tasks.   Not reassessed due to missed visits    Adrienne Pereyra will achieve at least 10# in all prehension patterns without pain for opening packages  Status at last note/certification:See initial evaluation  Status at discharge: not met Not reassessed due to missed visits    Adrienne Pereyra will report improved performance in self and home care as shown by increase in FOTO of at least 20 points  Status at last note/certification:FOTO 39  Status at discharge: not met    ASSESSMENT/Changes in Function: Patient progressed well during course of therapy. Pain decreased fro 4/10 to 0/10. She missed last appointment and phone is not in service.       ASSESSMENT/RECOMMENDATIONS:  [x]Discontinue therapy: []Patient has reached or is progressing toward set goals      [x]Patient is non-compliant or has abdicated      []Due to lack of appreciable progress towards set goals    NYDIA Christiansen/L 12/15/2017 10:17 AM

## 2017-12-19 ENCOUNTER — OFFICE VISIT (OUTPATIENT)
Dept: FAMILY MEDICINE CLINIC | Age: 62
End: 2017-12-19

## 2017-12-19 VITALS
DIASTOLIC BLOOD PRESSURE: 79 MMHG | HEIGHT: 67 IN | RESPIRATION RATE: 20 BRPM | WEIGHT: 231.2 LBS | OXYGEN SATURATION: 96 % | TEMPERATURE: 97.7 F | HEART RATE: 92 BPM | BODY MASS INDEX: 36.29 KG/M2 | SYSTOLIC BLOOD PRESSURE: 121 MMHG

## 2017-12-19 DIAGNOSIS — M25.532 LEFT WRIST PAIN: ICD-10-CM

## 2017-12-19 DIAGNOSIS — B18.2 HEP C W/O COMA, CHRONIC (HCC): ICD-10-CM

## 2017-12-19 DIAGNOSIS — H40.10X0 OPEN-ANGLE GLAUCOMA OF BOTH EYES, UNSPECIFIED GLAUCOMA STAGE, UNSPECIFIED OPEN-ANGLE GLAUCOMA TYPE: ICD-10-CM

## 2017-12-19 DIAGNOSIS — L08.1 ERYTHRASMA: ICD-10-CM

## 2017-12-19 DIAGNOSIS — I10 ESSENTIAL HYPERTENSION: Primary | ICD-10-CM

## 2017-12-19 PROBLEM — R19.5 POSITIVE FIT (FECAL IMMUNOCHEMICAL TEST): Status: RESOLVED | Noted: 2017-07-26 | Resolved: 2017-12-19

## 2017-12-19 RX ORDER — OXYCODONE HYDROCHLORIDE 5 MG/1
5 TABLET ORAL
Qty: 21 TAB | Refills: 0 | Status: SHIPPED | OUTPATIENT
Start: 2017-12-19 | End: 2017-12-26

## 2017-12-19 NOTE — TELEPHONE ENCOUNTER
Medication: Advair 100-50 mcg, dose: 1 inhalation, how often: twice daily, current number of medication days provided: 90, refill per application. Lot #: [id number], EXP.02/19. This medication was received and verified for the following 1. Correct Patient, 2. Correct Diagnosis, 3. Correct Drug, 4. Correct route, and no current allergy to medication. Please contact patient to come  their medications.      Chick Sicard, MSN, RN, [facility]

## 2017-12-19 NOTE — LETTER
12/19/2017 [address] [facility] [address], 1955, is picking up the following medications ordered from the [facility]. ADVAIR DISKUS 100/50 MCG QUANTITY: 3 & NASONEX 50 MCG QUANTITY: 3 Roni Bautista Patient's Signature:_________________________________________________ Today's Date: 12/19/2017

## 2017-12-19 NOTE — PROGRESS NOTES
12/19/17    PCP: Roberto Coffey NP    Chief Complaint   Patient presents with    Follow Up Chronic Condition    Wrist Pain        HISTORY OF PRESENT ILLNESS  Moriah Frank  is a 63 y.o. female whom presents for Follow Up Chronic Condition and Wrist Pain    HPI  Cardiovascular Review:  The patient has hypertension. Diet and Lifestyle: generally follows a low fat low cholesterol diet, generally follows a low sodium diet  Home BP Monitoring: is not measured at home. Pertinent ROS: taking medications as instructed, no medication side effects noted, no TIA's, no chest pain on exertion, no dyspnea on exertion, no swelling of ankles. COPD Review:  The patient is being seen for follow up of COPD. Oxygen: She currently is not on home oxygen therapy. Symptoms: Stable on current regimen       Pt with hep C genotype 1A reports she has had her Hep B vaccination completed and is pending second Hep A vaccinations. Pending Harvoni initiation. Denies N/V/D or abdominal pain. Current Outpatient Prescriptions   Medication Sig Dispense Refill    oxyCODONE IR (ROXICODONE) 5 mg immediate release tablet Take 1 Tab by mouth every six (6) hours as needed for Pain for up to 7 days. Max Daily Amount: 20 mg. Indications: Pain 21 Tab 0    verapamil (CALAN) 120 mg tablet TAKE ONE TABLET BY MOUTH TWICE A DAY 60 Tab 4    ibuprofen (MOTRIN) 800 mg tablet Take 1 Tab by mouth every eight (8) hours as needed for Pain. Indications: Pain 30 Tab 0    VITAMIN D2 50,000 unit capsule TAKE ONE CAPSULE BY MOUTH EVERY 7 DAYS 4 Cap 8    fluticasone-salmeterol (ADVAIR) 100-50 mcg/dose diskus inhaler Take 1 Puff by inhalation every twelve (12) hours. 3 Inhaler 3    albuterol (PROAIR HFA) 90 mcg/actuation inhaler Take 1-2 Puffs by inhalation every four (4) hours as needed for Wheezing. 3 Inhaler 3    olmesartan-hydroCHLOROthiazide (BENICAR HCT) 40-25 mg per tablet Take 1 Tab by mouth daily.  Indications: Hypertension 90 Tab 3    dexlansoprazole (DEXILANT) 30 mg capsule Take 1 Cap by mouth daily as needed. Indications: GASTROESOPHAGEAL REFLUX 90 Cap 3    mometasone (NASONEX) 50 mcg/actuation nasal spray 2 Sprays by Both Nostrils route daily. 2 Container 0    clobetasol (TEMOVATE) 0.05 % ointment Apply  to affected area two (2) times a day.  magnesium oxide (MAG-OX) 400 mg tablet Take 1 Tab by mouth daily. 30 Tab 11    travoprost (TRAVATAN Z) 0.004 % ophthalmic solution Administer 1 Drop to both eyes every evening. Indications: OPEN ANGLE GLAUCOMA 5 mL 3    ledipasvir-sofosbuvir (HARVONI)  mg tab Take 1 Tab by mouth daily. Indications: CHRONIC HEPATITIS C - GENOTYPE 1 90 Tab 0     Allergies   Allergen Reactions    Compazine [Prochlorperazine] Hives     Past Medical History:   Diagnosis Date    Arthritis of knee, left 2007    Chronic obstructive pulmonary disease (HCC)     mild    Diverticulosis of sigmoid colon 10/2017    Mild diverticulosis    Erythrasma May 2014    beba feet, lower left leg    Glaucoma     H/O mammogram 03/01/2016    normal, f/u in 1 year    Hepatitis C 2002    genotype 1a, previous IV drug user, declined interferons     Hypertension 2002    Positive FIT (fecal immunochemical test) 07/26/2017    Tobacco use     working with [employer] [facility] [employer] smoking cessation      Past Surgical History:   Procedure Laterality Date    COLONOSCOPY N/A 10/11/2017    COLONOSCOPY performed by Maricruz Alexander MD at [address] [place] [address] HX CHOLECYSTECTOMY  2006    laparoscopic    HX COLONOSCOPY  2012    hx of polyps, [place]    HX COLONOSCOPY  10/2017     Dr. Jens Briscoe recommends your next colonoscopy in 5 years.     HX OTHER SURGICAL  2002    liver biopsy    HX TUBAL LIGATION       Family History   Problem Relation Age of Onset    Hypertension Mother     COPD Mother     Other Mother      Poly? nervous system    Cancer Father      rare form bone     Colon Polyps Sister     Colon Polyps Maternal Aunt      Social History   Substance Use Topics    Smoking status: Current Every Day Smoker     Packs/day: 0.50     Types: Cigarettes     Start date: 1/12/1974    Smokeless tobacco: Never Used    Alcohol use No      Lab Results   Component Value Date/Time    WBC 4.7 12/11/2017 09:22 AM    HGB 12.5 12/11/2017 09:22 AM    HCT 36.6 12/11/2017 09:22 AM    PLATELET 166 81/24/7229 09:22 AM    MCV 92.4 12/11/2017 09:22 AM     Lab Results   Component Value Date/Time    Hemoglobin A1c 4.9 03/24/2015 10:05 AM    Hemoglobin A1c 5.2 12/02/2014 07:52 AM    Glucose 76 12/11/2017 09:22 AM    LDL, calculated 47.2 12/11/2017 09:22 AM    Creatinine 0.67 12/11/2017 09:22 AM      Lab Results   Component Value Date/Time    Cholesterol, total 112 12/11/2017 09:22 AM    HDL Cholesterol 49 12/11/2017 09:22 AM    LDL, calculated 47.2 12/11/2017 09:22 AM    Triglyceride 79 12/11/2017 09:22 AM    CHOL/HDL Ratio 2.3 12/11/2017 09:22 AM     Lab Results   Component Value Date/Time    Sodium 140 12/11/2017 09:22 AM    Potassium 4.0 12/11/2017 09:22 AM    Chloride 109 12/11/2017 09:22 AM    CO2 27 12/11/2017 09:22 AM    Anion gap 4 12/11/2017 09:22 AM    Glucose 76 12/11/2017 09:22 AM    BUN 18 12/11/2017 09:22 AM    Creatinine 0.67 12/11/2017 09:22 AM    BUN/Creatinine ratio 27 12/11/2017 09:22 AM    GFR est AA >60 12/11/2017 09:22 AM    GFR est non-AA >60 12/11/2017 09:22 AM    Calcium 8.0 12/11/2017 09:22 AM    Bilirubin, total 0.7 12/11/2017 09:22 AM    ALT (SGPT) 159 12/11/2017 09:22 AM    AST (SGOT) 187 12/11/2017 09:22 AM    Alk.  phosphatase 82 12/11/2017 09:22 AM    Protein, total 6.5 12/11/2017 09:22 AM    Albumin 2.7 12/11/2017 09:22 AM    Globulin 3.8 12/11/2017 09:22 AM    A-G Ratio 0.7 12/11/2017 09:22 AM                      Visit Vitals    /79    Pulse 92    Temp 97.7 °F (36.5 °C)    Resp 20    Ht 5' 7\" (1.702 m)    Wt 231 lb 3.2 oz (104.9 kg)    SpO2 96%    BMI 36.21 kg/m2       Pain Scale: 8/10    Pain Location: Wrist (Left wrist pain)    Review of Systems   Constitutional: Negative. HENT: Negative. Eyes: Positive for redness. Burning eyes    Respiratory: Negative. Cardiovascular: Negative. Gastrointestinal: Negative. Genitourinary: Negative. Musculoskeletal: Positive for joint pain (Reports continued Left Wrist pain that is getting worse. She is currently in OT, wears brace, and reports that ibuprofen is ineffective. She has a f/u appt with Orthopedic surgeon on 12/26). Skin: Positive for rash (feet). Neurological: Negative. Endo/Heme/Allergies: Negative. Psychiatric/Behavioral: Negative. Physical Exam   Constitutional: She is oriented to person, place, and time and well-developed, well-nourished, and in no distress. HENT:   Head: Normocephalic. Eyes: Conjunctivae are normal. Pupils are equal, round, and reactive to light. Lids are everted and swept, no foreign bodies found. Scleral icterus is present. Neck: Normal range of motion. Neck supple. Cardiovascular: Normal rate, regular rhythm and normal heart sounds. Pulmonary/Chest: Effort normal and breath sounds normal.   Abdominal: Soft. Bowel sounds are normal.   Musculoskeletal:        Left wrist: She exhibits decreased range of motion and tenderness. Neurological: She is alert and oriented to person, place, and time. Skin: Skin is warm and dry. Rash (Feet) noted. Psychiatric: Mood and affect normal.   Vitals reviewed. Radiology reports:   EXAMINATION: Left wrist 3 views     INDICATION: Left wrist pain     COMPARISON: None     FINDINGS: 3 views of the left wrist obtained. Suggestion of a nondisplaced  fracture in the anterior lunate seen on lateral view. Slightly prominent  scapholunate interval. Joint spaces otherwise maintained.     IMPRESSION  IMPRESSION:  1. Possible nondisplaced fracture of the anterior lunate as above. Slightly  prominent scapholunate interval, possibly ligamental injury or laxity.     SSESSMENT and PLAN    ICD-10-CM ICD-9-CM    1. Essential hypertension I10 401.9    2. Erythrasma L08.1 039.0 REFERRAL TO DERMATOLOGY   3. Hep C w/o coma, chronic (HCC) B18.2 070.54    4. Open-angle glaucoma of both eyes, unspecified glaucoma stage, unspecified open-angle glaucoma type H40.10X0 365.10 REFERRAL TO OPHTHALMOLOGY     365.70    5. Left wrist pain M25.532 719.43 oxyCODONE IR (ROXICODONE) 5 mg immediate release tablet     Diagnoses and all orders for this visit:    1. Essential hypertension  -The current medical regimen is effective;  continue present plan and medications. 2. Erythrasma  -     REFERRAL TO DERMATOLOGY    3. Hep C w/o coma, chronic (HCC)  - the patient to bring in proof of Heb B vaccination and initiation of Hep A vaccine series. Once confirmed will proceed with preliminary testing for Harvoni initiation. 4. Open-angle glaucoma of both eyes, unspecified glaucoma stage, unspecified open-angle glaucoma type  -     REFERRAL TO OPHTHALMOLOGY    5. Left wrist pain  -     oxyCODONE IR (ROXICODONE) 5 mg immediate release tablet; Take 1 Tab by mouth every six (6) hours as needed for Pain for up to 7 days. Max Daily Amount: 20 mg. Indications: Pain  - Pt with continue Left Wrist pain not improving. Recommend follow up with Ortho for further testing and treatment. Will give 1 week supply of pain medication until she is able to see Orthopedic surgeon. Tylenol contraindicated given liver disease. Follow-up Disposition:  Return in about 1 month (around 1/19/2018). reviewed medications and side effects in detail      There are no discontinued medications. Written instructions followed our verbal discussion of all information discussed above, pending tests ordered and future goals/plans. Patient expressed understanding of current diagnosis, planned testing, follow up and if needed to contact the office for any questions or concerns prior to the next visit.     Call [facility] for Financial Assistance

## 2017-12-19 NOTE — PATIENT INSTRUCTIONS
Hepatitis C: Care Instructions  Your Care Instructions    Hepatitis C is an infection of the liver caused by a virus. This virus spreads when blood or body fluids from an infected person enter another person's body. This occurs most often when people share needles that have the virus on them. In the past, people got the virus through blood transfusions and organ transplants. But since 1992, all donated blood and organs have been screened for hepatitis C. So getting the virus this way is now very rare. Less often, hepatitis C can spread through sex and sharing items such as razor blades or toothbrushes. Needles used for tattoos and body piercings can also spread the virus. The virus doesn't always cause symptoms. But you may feel tired. And you may have a headache, sore muscles, nausea, and pain in the upper right belly. Other symptoms include yellowish skin and dark urine. Home treatment can help ease symptoms. And your doctor may prescribe antiviral medicine. Long-term infection can lead to severe liver damage. So make sure to go to your follow-up appointments. Follow-up care is a key part of your treatment and safety. Be sure to make and go to all appointments, and call your doctor if you are having problems. It's also a good idea to know your test results and keep a list of the medicines you take. How can you care for yourself at home? · Be safe with medicines. If your doctor prescribes antiviral medicine, take it exactly as prescribed. Call your doctor if you think you are having a problem with your medicine. · Do not drink alcohol. Alcohol can damage the liver. Tell your doctor if you need help to quit. Counseling, support groups, and sometimes medicines can help you stay sober. · Do not take drugs or herbal medicines. They can make liver problems worse. · Make sure your doctor knows all of the medicines you take. Some medicines, such as acetaminophen (Tylenol), can make liver problems worse.  Do not take any new medicines unless your doctor tells you to. This includes over-the-counter medicines. · Maintain a healthy lifestyle. Get plenty of exercise if you feel up to it. Eat a healthy diet. · Drink plenty of fluids, enough so that your urine is light yellow or clear like water. If you have kidney, heart, or liver disease and have to limit fluids, talk with your doctor before you increase the amount of fluids you drink. · Get the vaccines (if you have not already) to protect yourself from hepatitis A and hepatitis B, influenza, and pneumococcus. · The infection can make you itch. Keep cool and stay out of the sun. Try to wear cotton clothing. Talk to your doctor about using over-the-counter medicines for itching. These include diphenhydramine (Benadryl) and chlorpheniramine (Chlor-Trimeton). Follow the instructions on the label. · If you feel depressed, talk to your doctor about treatment. Many people who have long-term illnesses get depressed. Keep in mind that antiviral medicine can make depression worse. To avoid spreading hepatitis C to others  · Tell the people that you live with or have sex with about your illness as soon as you can. · Don't share needles to inject drugs. Don't share other equipment (such as cotton, spoons, and water) with others. Find out if a needle exchange program is available in your area, and use it. Get into a drug treatment program.  · Practice safer sex. Reduce your number of sex partners if you have more than one. Unless you are in a long-term relationship in which neither partner has sex with anyone else, always use latex condoms when you have sex. · Don't donate blood or blood products, organs, semen, or eggs (ova). · Make sure that all equipment is sterilized if you get a tattoo, have your body pierced, or have acupuncture. · Do not share your personal items. These include razors, toothbrushes, towels, and nail files.   · Tell your doctor, dentist, and anyone else who may come in contact with your blood about your illness. · Prevent others from coming in contact with your blood and other body fluids. Keep any cuts, scrapes, or blisters covered. · Wash your hands-and any object that has come in contact with your blood-thoroughly with water and soap. When should you call for help? Call 911 anytime you think you may need emergency care. For example, call if:  ? · You have trouble breathing. ? · You vomit blood or what looks like coffee grounds. ?Call your doctor now or seek immediate medical care if:  ? · You feel very sleepy or confused. ? · You have a fever. ? · There is a new or increasing yellow tint to your skin or the whites of your eyes. ? · You have new or worse belly pain. ? · You have any abnormal bleeding, such as:  ¨ Nosebleeds. ¨ Vaginal bleeding that is different (heavier, more frequent, at a different time of the month) than what you are used to. ¨ Bloody or black stools, or rectal bleeding. ¨ Bloody or pink urine. ? Watch closely for changes in your health, and be sure to contact your doctor if:  ? · You have any problems. ? · Your belly is getting bigger. ? · You are gaining weight. Where can you learn more? Go to http://drew-tarun.info/. Enter D723 in the search box to learn more about \"Hepatitis C: Care Instructions. \"  Current as of: March 3, 2017  Content Version: 11.4  © 1717-4478 [employer]. Care instructions adapted under license by [employer] (which disclaims liability or warranty for this information). If you have questions about a medical condition or this instruction, always ask your healthcare professional. [place] [address] any warranty or liability for your use of this information.

## 2017-12-19 NOTE — MR AVS SNAPSHOT
Visit Information Date & Time Provider Department Dept. Phone Encounter #  
 12/19/2017 11:00 AM Lon Burnham NP [address] [facility] [address] [id number] Your Appointments 12/26/2017  8:20 AM  
Follow Up with Roberta Bedolla MD  
[address] [facility], [address] and [facility] ([address]) Appt Note: left wrist f/u (post physical therapy) [address], [address] 200 [facility]  
[phone] [address], [address]  
  
    
 1/4/2018 10:00 AM  
CONSULT with NURSE NAVIGATOR [address] [facility] [address] [facility] ([address]) Appt Note: harvoni  
 [address] [facility] [address]  
[address] [facility] [address]  
  
    
 1/16/2018  8:00 AM  
Follow Up with Kenyetta Perez NP [address] [facility] ([address]) Appt Note: 1 mth follow up/ No labs needed [address] [facility] [address]  
[address] [facility] [address] Upcoming Health Maintenance Date Due DTaP/Tdap/Td series (1 - Tdap) 12/14/1978 ZOSTER VACCINE AGE 60> 10/14/2017 PAP AKA CERVICAL CYTOLOGY 1/6/2018 Pneumococcal 19-64 Medium Risk (1 of 1 - PPSV23) 1/24/2018* FOBT Q 1 YEAR AGE 50-75 7/23/2018 *Topic was postponed. The date shown is not the original due date. Allergies as of 12/19/2017  Review Complete On: 12/19/2017 By: Elizabeth Vega Severity Noted Reaction Type Reactions Compazine [Prochlorperazine]  06/07/2013    Hives Current Immunizations  Reviewed on 11/18/2015 Name Date Influenza Vaccine (Quad) PF 10/17/2016, 11/18/2015 Influenza Vaccine PF 10/31/2013 Not reviewed this visit You Were Diagnosed With   
  
 Codes Comments  Essential hypertension    -  Primary ICD-10-CM: I10 
 ICD-9-CM: 401.9 Erythrasma     ICD-10-CM: L08.1 ICD-9-CM: 039.0 Hep C w/o coma, chronic (HCC)     ICD-10-CM: B18.2 ICD-9-CM: 070.54 Open-angle glaucoma of both eyes, unspecified glaucoma stage, unspecified open-angle glaucoma type     ICD-10-CM: H40.10X0 ICD-9-CM: 365.10, 365.70 Left wrist pain     ICD-10-CM: M25.532 ICD-9-CM: 719.43 Vitals BP Pulse Temp Resp Height(growth percentile) Weight(growth percentile) 121/79 92 97.7 °F (36.5 °C) 20 5' 7\" (1.702 m) 231 lb 3.2 oz (104.9 kg) SpO2 BMI OB Status Smoking Status 96% 36.21 kg/m2 Postmenopausal Current Every Day Smoker Vitals History BMI and BSA Data Body Mass Index Body Surface Area  
 36.21 kg/m 2 2.23 m 2 Preferred Pharmacy Pharmacy Name Phone Flory Kelyl ,[facility], [address] [facility] [phone] Your Updated Medication List  
  
   
This list is accurate as of: 12/19/17 11:47 AM.  Always use your most recent med list.  
  
  
  
  
 albuterol 90 mcg/actuation inhaler Commonly known as:  PROAIR HFA Take 1-2 Puffs by inhalation every four (4) hours as needed for Wheezing. clobetasol 0.05 % ointment Commonly known as:  Patricia Plume Apply  to affected area two (2) times a day. dexlansoprazole 30 mg capsule Commonly known as:  DEXILANT Take 1 Cap by mouth daily as needed. Indications: GASTROESOPHAGEAL REFLUX  
  
 fluticasone-salmeterol 100-50 mcg/dose diskus inhaler Commonly known as:  ADVAIR Take 1 Puff by inhalation every twelve (12) hours. ibuprofen 800 mg tablet Commonly known as:  MOTRIN Take 1 Tab by mouth every eight (8) hours as needed for Pain. Indications: Pain  
  
 ledipasvir-sofosbuvir  mg Tab Commonly known as:  Deep Fossa Take 1 Tab by mouth daily. Indications: CHRONIC HEPATITIS C - GENOTYPE 1  
  
 magnesium oxide 400 mg tablet Commonly known as:  MAG-OX Take 1 Tab by mouth daily. mometasone 50 mcg/actuation nasal spray Commonly known as:  NASONEX  
2 Sprays by Both Nostrils route daily. olmesartan-hydroCHLOROthiazide 40-25 mg per tablet Commonly known as:  BENICAR HCT Take 1 Tab by mouth daily. Indications: Hypertension  
  
 oxyCODONE IR 5 mg immediate release tablet Commonly known as:  Urban Chute Take 1 Tab by mouth every six (6) hours as needed for Pain for up to 7 days. Max Daily Amount: 20 mg. Indications: Pain  
  
 travoprost 0.004 % ophthalmic solution Commonly known as:  TRAVATAN Z Administer 1 Drop to both eyes every evening. Indications: OPEN ANGLE GLAUCOMA  
  
 verapamil 120 mg tablet Commonly known as:  CALAN  
TAKE ONE TABLET BY MOUTH TWICE A DAY  
  
 VITAMIN D2 50,000 unit capsule Generic drug:  ergocalciferol TAKE ONE CAPSULE BY MOUTH EVERY 7 DAYS Prescriptions Printed Refills  
 oxyCODONE IR (ROXICODONE) 5 mg immediate release tablet 0 Sig: Take 1 Tab by mouth every six (6) hours as needed for Pain for up to 7 days. Max Daily Amount: 20 mg. Indications: Pain Class: Print Route: Oral  
  
We Performed the Following REFERRAL TO DERMATOLOGY [REF19 Custom] Comments:  
 Please evaluate patient for rash of feet. The patient is open to going to local dermatology with $25 co pay or [facility] which ever is quickest  
 REFERRAL TO OPHTHALMOLOGY [REF57 Custom] Comments: [facility] specialists Referral Information Referral ID Referred By Referred To  
  
 [id number] [facility] DEANDRE Not Available Visits Status Start Date End Date 1 New Request 12/19/17 12/19/18 If your referral has a status of pending review or denied, additional information will be sent to support the outcome of this decision. Referral ID Referred By Referred To  
 [id number] [facility] DEANDRE Not Available Visits Status Start Date End Date 1 New Request 12/19/17 12/19/18 If your referral has a status of pending review or denied, additional information will be sent to support the outcome of this decision. Patient Instructions Hepatitis C: Care Instructions Your Care Instructions Hepatitis C is an infection of the liver caused by a virus. This virus spreads when blood or body fluids from an infected person enter another person's body. This occurs most often when people share needles that have the virus on them. In the past, people got the virus through blood transfusions and organ transplants. But since 1992, all donated blood and organs have been screened for hepatitis C. So getting the virus this way is now very rare. Less often, hepatitis C can spread through sex and sharing items such as razor blades or toothbrushes. Needles used for tattoos and body piercings can also spread the virus. The virus doesn't always cause symptoms. But you may feel tired. And you may have a headache, sore muscles, nausea, and pain in the upper right belly. Other symptoms include yellowish skin and dark urine. Home treatment can help ease symptoms. And your doctor may prescribe antiviral medicine. Long-term infection can lead to severe liver damage. So make sure to go to your follow-up appointments. Follow-up care is a key part of your treatment and safety. Be sure to make and go to all appointments, and call your doctor if you are having problems. It's also a good idea to know your test results and keep a list of the medicines you take. How can you care for yourself at home? · Be safe with medicines. If your doctor prescribes antiviral medicine, take it exactly as prescribed. Call your doctor if you think you are having a problem with your medicine. · Do not drink alcohol. Alcohol can damage the liver. Tell your doctor if you need help to quit. Counseling, support groups, and sometimes medicines can help you stay sober. · Do not take drugs or herbal medicines. They can make liver problems worse. · Make sure your doctor knows all of the medicines you take. Some medicines, such as acetaminophen (Tylenol), can make liver problems worse. Do not take any new medicines unless your doctor tells you to. This includes over-the-counter medicines. · Maintain a healthy lifestyle. Get plenty of exercise if you feel up to it. Eat a healthy diet. · Drink plenty of fluids, enough so that your urine is light yellow or clear like water. If you have kidney, heart, or liver disease and have to limit fluids, talk with your doctor before you increase the amount of fluids you drink. · Get the vaccines (if you have not already) to protect yourself from hepatitis A and hepatitis B, influenza, and pneumococcus. · The infection can make you itch. Keep cool and stay out of the sun. Try to wear cotton clothing. Talk to your doctor about using over-the-counter medicines for itching. These include diphenhydramine (Benadryl) and chlorpheniramine (Chlor-Trimeton). Follow the instructions on the label. · If you feel depressed, talk to your doctor about treatment. Many people who have long-term illnesses get depressed. Keep in mind that antiviral medicine can make depression worse. To avoid spreading hepatitis C to others · Tell the people that you live with or have sex with about your illness as soon as you can. · Don't share needles to inject drugs. Don't share other equipment (such as cotton, spoons, and water) with others. Find out if a needle exchange program is available in your area, and use it. Get into a drug treatment program. 
· Practice safer sex. Reduce your number of sex partners if you have more than one. Unless you are in a long-term relationship in which neither partner has sex with anyone else, always use latex condoms when you have sex. · Don't donate blood or blood products, organs, semen, or eggs (ova). · Make sure that all equipment is sterilized if you get a tattoo, have your body pierced, or have acupuncture. · Do not share your personal items. These include razors, toothbrushes, towels, and nail files. · Tell your doctor, dentist, and anyone else who may come in contact with your blood about your illness. · Prevent others from coming in contact with your blood and other body fluids. Keep any cuts, scrapes, or blisters covered. · Wash your hands-and any object that has come in contact with your blood-thoroughly with water and soap. When should you call for help? Call 911 anytime you think you may need emergency care. For example, call if: 
? · You have trouble breathing. ? · You vomit blood or what looks like coffee grounds. ?Call your doctor now or seek immediate medical care if: 
? · You feel very sleepy or confused. ? · You have a fever. ? · There is a new or increasing yellow tint to your skin or the whites of your eyes. ? · You have new or worse belly pain. ? · You have any abnormal bleeding, such as: 
¨ Nosebleeds. ¨ Vaginal bleeding that is different (heavier, more frequent, at a different time of the month) than what you are used to. ¨ Bloody or black stools, or rectal bleeding. ¨ Bloody or pink urine. ? Watch closely for changes in your health, and be sure to contact your doctor if: 
? · You have any problems. ? · Your belly is getting bigger. ? · You are gaining weight. Where can you learn more? Go to http://drew-tarun.info/. Enter Y426 in the search box to learn more about \"Hepatitis C: Care Instructions. \" Current as of: March 3, 2017 Content Version: 11.4 © 1044-2998 [employer]. Care instructions adapted under license by [employer] (which disclaims liability or warranty for this information).  If you have questions about a medical condition or this instruction, always ask your healthcare professional. [employer] disclaims any warranty or liability for your use of this information. Introducing [facility]! Dear Darya Livingston: Thank you for requesting a Narus account. Our records indicate that you already have an active Narus account. You can access your account anytime at [email or web address] Did you know that you can access your hospital and ER discharge instructions at any time in Narus? You can also review all of your test results from your hospital stay or ER visit. Additional Information If you have questions, please visit the Frequently Asked Questions section of the Narus website at [email or web address] Remember, Narus is NOT to be used for urgent needs. For medical emergencies, dial 911. Now available from your iPhone and Android! Please provide this summary of care documentation to your next provider. Your primary care clinician is listed as Emily Huerta. If you have any questions after today's visit, please call [phone].

## 2017-12-26 ENCOUNTER — OFFICE VISIT (OUTPATIENT)
Dept: ORTHOPEDIC SURGERY | Age: 62
End: 2017-12-26

## 2017-12-26 VITALS
DIASTOLIC BLOOD PRESSURE: 83 MMHG | HEART RATE: 92 BPM | RESPIRATION RATE: 16 BRPM | TEMPERATURE: 97.1 F | HEIGHT: 67 IN | WEIGHT: 235 LBS | BODY MASS INDEX: 36.88 KG/M2 | OXYGEN SATURATION: 97 % | SYSTOLIC BLOOD PRESSURE: 143 MMHG

## 2017-12-26 DIAGNOSIS — M25.532 LEFT WRIST PAIN: ICD-10-CM

## 2017-12-26 DIAGNOSIS — M25.332 SCAPHOLUNATE DISSOCIATION OF LEFT WRIST: Primary | ICD-10-CM

## 2017-12-26 RX ORDER — MELOXICAM 15 MG/1
15 TABLET ORAL
Qty: 30 TAB | Refills: 1 | Status: SHIPPED | OUTPATIENT
Start: 2017-12-26 | End: 2018-04-24

## 2017-12-26 NOTE — PROGRESS NOTES
Verbal order given by Dr. Gloria Jurado to sign order for left wrist brace entered by Montserrat Carter.

## 2017-12-26 NOTE — PROGRESS NOTES
Verona Rothman  1955   Chief Complaint   Patient presents with    Hand Pain     Left        HISTORY OF PRESENT ILLNESS  Verona Rothman is a 63 y.o. female who presents today for reevaluation of left wrist pain. Patient rates pain as 10/10 today. At last OV, patient was given a wrist brace. She continues to have swelling and pain. She has tenderness over the wrist and describes a shooting pain up the forearm. Patient denies any fever, chills, chest pain, shortness of breath or calf pain. There are no new illness or injuries to report since last seen in the office. There are no changes to medications, allergies, family or social history. PHYSICAL EXAM:   Visit Vitals    /83    Pulse 92    Temp 97.1 °F (36.2 °C) (Oral)    Resp 16    Ht 5' 7\" (1.702 m)    Wt 235 lb (106.6 kg)    SpO2 97%    BMI 36.81 kg/m2     The patient is a well-developed, well-nourished female   in no acute distress. The patient is alert and oriented times three. The patient is alert and oriented times three. Mood and affect are normal.  LYMPHATIC: lymph nodes are not enlarged and are within normal limits  SKIN: normal in color and non tender to palpation. There are no bruises or abrasions noted. NEUROLOGICAL: Motor sensory exam is within normal limits. Reflexes are equal bilaterally.  There is normal sensation to pinprick and light touch  MUSCULOSKELETAL:  Examination Left Hand   Skin Intact   Deformity -   Swelling -   Tenderness -   Tenderness A1 Pulley -   Finger flexion Full   Finger extension Full   Thenar Eminence Atrophy -   Sensation Normal   Capillary refill -   Heberden's nodes -   Dupuytren's -      Examination Left Wrist   Skin Intact   Tenderness diffuse   Flexion 20   Extension 20   Deformity +   Effusion +   Tinnel's sign -   Phalen's test -   Finklestein maneuver -   Pain with thumb abduction -        IMAGING: XR of the left wrist dated 12/26/17 was reviewed and read: increased distance in the scapholunate joint, about 4mm. XR of the left wrist dated 10/18/17 was reviewed and read: 4 views with scaphoid view show no acute abnormalities.     XR of the left wrist dated 9/25/17 was reviewed and read:   IMPRESSION:  1. Possible nondisplaced fracture of the anterior lunate as above. Slightly  prominent scapholunate interval, possibly ligamental injury or laxity. IMPRESSION:      ICD-10-CM ICD-9-CM    1. 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 Left wrist pain M25.532 719.43 AMB POC XRAY, WRIST; COMPLETE, 3+ VIE        PLAN:   1. I am concerned with an injury to the ligaments in the left wrist because of the XR documented scapholunate dissociation. Risk factors include: htn  2. No cortisone injection indicated today   3. No Physical/Occupational Therapy indicated today  4. Yes diagnostic test indicated today MRI L WRIST  5. Yes durable medical equipment indicated today WRIST SPLINT  6. No referral indicated today   7. Yes medications indicated today MOBIC  8. No Narcotic indicated today    RTC following MRI  Follow-up Disposition: Not on File    Scribed by Juliana Reece [address] [place] [address] [address]) as dictated by Ford Castaneda MD    I, Dr. Ford Castaneda, confirm that all documentation is accurate.     Ford Castaneda M.D.   [facility]

## 2017-12-28 ENCOUNTER — HOSPITAL ENCOUNTER (OUTPATIENT)
Dept: MRI IMAGING | Age: 62
Discharge: HOME | End: 2017-12-28
Attending: ORTHOPAEDIC SURGERY
Payer: SUBSIDIZED

## 2017-12-28 DIAGNOSIS — M25.332 SCAPHOLUNATE DISSOCIATION OF LEFT WRIST: ICD-10-CM

## 2017-12-28 PROCEDURE — 73221 MRI JOINT UPR EXTREM W/O DYE: CPT

## 2018-01-16 ENCOUNTER — OFFICE VISIT (OUTPATIENT)
Dept: FAMILY MEDICINE CLINIC | Age: 63
End: 2018-01-16

## 2018-01-16 ENCOUNTER — HOSPITAL ENCOUNTER (OUTPATIENT)
Dept: LAB | Age: 63
Discharge: HOME | End: 2018-01-16

## 2018-01-16 ENCOUNTER — OFFICE VISIT (OUTPATIENT)
Dept: ORTHOPEDIC SURGERY | Age: 63
End: 2018-01-16

## 2018-01-16 VITALS
HEART RATE: 108 BPM | SYSTOLIC BLOOD PRESSURE: 165 MMHG | HEIGHT: 67 IN | OXYGEN SATURATION: 97 % | DIASTOLIC BLOOD PRESSURE: 98 MMHG | RESPIRATION RATE: 20 BRPM | BODY MASS INDEX: 36.7 KG/M2 | WEIGHT: 233.8 LBS | TEMPERATURE: 97.6 F

## 2018-01-16 VITALS
BODY MASS INDEX: 36.57 KG/M2 | WEIGHT: 233 LBS | HEART RATE: 85 BPM | TEMPERATURE: 97.4 F | DIASTOLIC BLOOD PRESSURE: 79 MMHG | SYSTOLIC BLOOD PRESSURE: 143 MMHG | OXYGEN SATURATION: 93 % | HEIGHT: 67 IN

## 2018-01-16 DIAGNOSIS — R39.9 URINARY TRACT INFECTION SYMPTOMS: ICD-10-CM

## 2018-01-16 DIAGNOSIS — B18.2 CHRONIC HEPATITIS C WITHOUT HEPATIC COMA (HCC): Primary | ICD-10-CM

## 2018-01-16 DIAGNOSIS — M25.532 LEFT WRIST PAIN: ICD-10-CM

## 2018-01-16 DIAGNOSIS — M87.039 AVASCULAR NECROSIS OF LUNATE (HCC): Primary | ICD-10-CM

## 2018-01-16 LAB
APPEARANCE UR: CLEAR
BACTERIA URNS QL MICRO: ABNORMAL /HPF
BILIRUB UR QL: ABNORMAL
COLOR UR: ABNORMAL
EPITH CASTS URNS QL MICRO: ABNORMAL /LPF (ref 0–5)
GLUCOSE UR STRIP.AUTO-MCNC: NEGATIVE MG/DL
HGB UR QL STRIP: ABNORMAL
KETONES UR QL STRIP.AUTO: ABNORMAL MG/DL
LEUKOCYTE ESTERASE UR QL STRIP.AUTO: ABNORMAL
NITRITE UR QL STRIP.AUTO: NEGATIVE
PH UR STRIP: 6.5 [PH] (ref 5–8)
PROT UR STRIP-MCNC: 300 MG/DL
RBC #/AREA URNS HPF: ABNORMAL /HPF (ref 0–5)
SP GR UR REFRACTOMETRY: 1.03 (ref 1–1.03)
UROBILINOGEN UR QL STRIP.AUTO: 4 EU/DL (ref 0.2–1)
WBC URNS QL MICRO: ABNORMAL /HPF (ref 0–4)
YEAST URNS QL MICRO: ABNORMAL

## 2018-01-16 PROCEDURE — 81001 URINALYSIS AUTO W/SCOPE: CPT | Performed by: NURSE PRACTITIONER

## 2018-01-16 PROCEDURE — 87086 URINE CULTURE/COLONY COUNT: CPT | Performed by: NURSE PRACTITIONER

## 2018-01-16 PROCEDURE — 87077 CULTURE AEROBIC IDENTIFY: CPT | Performed by: NURSE PRACTITIONER

## 2018-01-16 RX ORDER — SULFAMETHOXAZOLE AND TRIMETHOPRIM 400; 80 MG/1; MG/1
1 TABLET ORAL 2 TIMES DAILY
Qty: 6 TAB | Refills: 0 | Status: SHIPPED | OUTPATIENT
Start: 2018-01-16 | End: 2018-01-19

## 2018-01-16 RX ORDER — LEDIPASVIR AND SOFOSBUVIR 90; 400 MG/1; MG/1
1 TABLET, FILM COATED ORAL DAILY
Qty: 30 TAB | Refills: 3 | Status: SHIPPED | COMMUNITY
Start: 2018-01-16 | End: 2018-10-23

## 2018-01-16 NOTE — PROGRESS NOTES
01/16/18    PCP: Pia Dent NP    Chief Complaint   Patient presents with    Follow-up     left wrist pain Pt says she has appt with Dr. Maxim Carver . Pt did not take BP meds this morning         HISTORY OF PRESENT ILLNESS  Santos Casillas  is a 63 y.o. female whom presents for Follow-up (left wrist pain Pt says she has appt with Dr. Maxim Carver . Pt did not take BP meds this morning )        Bladder Infection    The history is provided by the patient. This is a new problem. The current episode started more than 2 days ago. The problem occurs every urination. The problem has not changed since onset. The pain is mild. There has been no fever. She is not sexually active. There is no history of pyelonephritis. Associated symptoms include frequency and abdominal pain (Pressure). Pertinent negatives include no chills, no sweats, no nausea, no vomiting, no discharge, no hematuria, no urgency, no flank pain, no vaginal discharge, no penile discharge and no back pain. Associated symptoms comments: Malodorous. The patient is not pregnant. Her past medical history is significant for recurrent UTIs. Her past medical history does not include kidney stones, single kidney, urological procedure, urinary stasis, catheterization or urinary catheter problem. Past medical history comments: Hepatitis. Hepatitis C  Carolyn Terrazas is an 63 y.o. female who presents for follow up of chronic Hepatitis C. She has completed her Hep B vaccinations and is looking to start treatment with Harvoni. Patient's current symptoms include dark urine and pruritis. Patient denies anorexia, diarrhea, fatigue, fever, headache, jaundice, malaise, myalgias, nausea, vomiting and weight loss. Current Outpatient Prescriptions   Medication Sig Dispense Refill    HARVONI  mg tab Take 1 Tab by mouth daily.  Indications: CHRONIC HEPATITIS C - GENOTYPE 1 30 Tab 3    trimethoprim-sulfamethoxazole (BACTRIM)  mg per tablet Take 1 Tab by mouth two (2) times a day for 3 days. 6 Tab 0    verapamil (CALAN) 120 mg tablet TAKE ONE TABLET BY MOUTH TWICE A DAY 60 Tab 4    ibuprofen (MOTRIN) 800 mg tablet Take 1 Tab by mouth every eight (8) hours as needed for Pain. Indications: Pain 30 Tab 0    VITAMIN D2 50,000 unit capsule TAKE ONE CAPSULE BY MOUTH EVERY 7 DAYS 4 Cap 8    fluticasone-salmeterol (ADVAIR) 100-50 mcg/dose diskus inhaler Take 1 Puff by inhalation every twelve (12) hours. 3 Inhaler 3    albuterol (PROAIR HFA) 90 mcg/actuation inhaler Take 1-2 Puffs by inhalation every four (4) hours as needed for Wheezing. 3 Inhaler 3    olmesartan-hydroCHLOROthiazide (BENICAR HCT) 40-25 mg per tablet Take 1 Tab by mouth daily. Indications: Hypertension 90 Tab 3    dexlansoprazole (DEXILANT) 30 mg capsule Take 1 Cap by mouth daily as needed. Indications: GASTROESOPHAGEAL REFLUX 90 Cap 3    mometasone (NASONEX) 50 mcg/actuation nasal spray 2 Sprays by Both Nostrils route daily. 2 Container 0    clobetasol (TEMOVATE) 0.05 % ointment Apply  to affected area two (2) times a day.  magnesium oxide (MAG-OX) 400 mg tablet Take 1 Tab by mouth daily. 30 Tab 11    travoprost (TRAVATAN Z) 0.004 % ophthalmic solution Administer 1 Drop to both eyes every evening. Indications: OPEN ANGLE GLAUCOMA 5 mL 3    meloxicam (MOBIC) 15 mg tablet Take 1 Tab by mouth daily (with breakfast).  30 Tab 1     Allergies   Allergen Reactions    Compazine [Prochlorperazine] Hives     Past Medical History:   Diagnosis Date    Arthritis of knee, left 2007    Chronic obstructive pulmonary disease (HCC)     mild    Diverticulosis of sigmoid colon 10/2017    Mild diverticulosis    Erythrasma May 2014    beba feet, lower left leg    Glaucoma     H/O mammogram 03/01/2016    normal, f/u in 1 year    Hepatitis C 2002    genotype 1a, previous IV drug user, declined interferons     Hypertension 2002    Positive FIT (fecal immunochemical test) 07/26/2017    Tobacco use working with [facility] smoking cessation      Past Surgical History:   Procedure Laterality Date    COLONOSCOPY N/A 10/11/2017    COLONOSCOPY performed by Dwain Thao MD at [address] HX CHOLECYSTECTOMY  2006    laparoscopic    HX COLONOSCOPY  2012    hx of polyps, [place]    HX COLONOSCOPY  10/2017     Dr. Welsh Rater recommends your next colonoscopy in 5 years.     HX OTHER SURGICAL  2002    liver biopsy    HX TUBAL LIGATION       Family History   Problem Relation Age of Onset    Hypertension Mother    [place] [employer] COPD Mother     Other Mother      Poly? nervous system    Cancer Father      rare form bone     Colon Polyps Sister     Colon Polyps Maternal Aunt      Social History   Substance Use Topics    Smoking status: Current Every Day Smoker     Packs/day: 0.50     Types: Cigarettes     Start date: 1/12/1974    Smokeless tobacco: Never Used    Alcohol use No      Lab Results   Component Value Date/Time    WBC 4.7 12/11/2017 09:22 AM    HGB 12.5 12/11/2017 09:22 AM    HCT 36.6 12/11/2017 09:22 AM    PLATELET 990 55/92/5233 09:22 AM    MCV 92.4 12/11/2017 09:22 AM     Lab Results   Component Value Date/Time    Hemoglobin A1c 4.9 03/24/2015 10:05 AM    Hemoglobin A1c 5.2 12/02/2014 07:52 AM    Glucose 76 12/11/2017 09:22 AM    LDL, calculated 47.2 12/11/2017 09:22 AM    Creatinine 0.67 12/11/2017 09:22 AM      Lab Results   Component Value Date/Time    Cholesterol, total 112 12/11/2017 09:22 AM    HDL Cholesterol 49 12/11/2017 09:22 AM    LDL, calculated 47.2 12/11/2017 09:22 AM    Triglyceride 79 12/11/2017 09:22 AM    CHOL/HDL Ratio 2.3 12/11/2017 09:22 AM     Lab Results   Component Value Date/Time    Sodium 140 12/11/2017 09:22 AM    Potassium 4.0 12/11/2017 09:22 AM    Chloride 109 12/11/2017 09:22 AM    CO2 27 12/11/2017 09:22 AM    Anion gap 4 12/11/2017 09:22 AM    Glucose 76 12/11/2017 09:22 AM    BUN 18 12/11/2017 09:22 AM    Creatinine 0.67 12/11/2017 09:22 AM    BUN/Creatinine ratio 27 12/11/2017 09:22 AM    GFR est AA >60 12/11/2017 09:22 AM    GFR est non-AA >60 12/11/2017 09:22 AM    Calcium 8.0 12/11/2017 09:22 AM    Bilirubin, total 0.7 12/11/2017 09:22 AM    ALT (SGPT) 159 12/11/2017 09:22 AM    AST (SGOT) 187 12/11/2017 09:22 AM    Alk. phosphatase 82 12/11/2017 09:22 AM    Protein, total 6.5 12/11/2017 09:22 AM    Albumin 2.7 12/11/2017 09:22 AM    Globulin 3.8 12/11/2017 09:22 AM    A-G Ratio 0.7 12/11/2017 09:22 AM         Visit Vitals    BP (!) 165/98    Pulse (!) 108    Temp 97.6 °F (36.4 °C)    Resp 20    Ht 5' 7\" (1.702 m)    Wt 233 lb 12.8 oz (106.1 kg)    SpO2 97%    BMI 36.62 kg/m2       Pain Scale: 4/10    Pain Location: Wrist    Review of Systems   Constitutional: Negative. Negative for chills. HENT: Negative. Eyes: Negative. Respiratory: Negative. Cardiovascular: Negative. Gastrointestinal: Positive for abdominal pain (Pressure). Negative for nausea and vomiting. Genitourinary: Positive for frequency. Negative for flank pain, hematuria, penile discharge, urgency and vaginal discharge. Abdominal pressure   Musculoskeletal: Negative. Negative for back pain. Skin: Positive for itching. Neurological: Negative. Endo/Heme/Allergies: Negative. Psychiatric/Behavioral: Negative. Physical Exam   Constitutional: She is oriented to person, place, and time and well-developed, well-nourished, and in no distress. HENT:   Head: Normocephalic. Eyes: Pupils are equal, round, and reactive to light. Lids are everted and swept, no foreign bodies found. Scleral icterus is present. Neck: Normal range of motion. Neck supple. Cardiovascular: Normal rate, regular rhythm and normal heart sounds. Pulmonary/Chest: Effort normal and breath sounds normal.   Abdominal: Soft. Bowel sounds are normal.   Neurological: She is alert and oriented to person, place, and time. Skin: Skin is warm and dry.    Psychiatric: Mood and affect normal. Vitals reviewed. ASSESSMENT and PLAN    ICD-10-CM ICD-9-CM    1. Chronic hepatitis C without hepatic coma (HCC) B18.2 070.54 HARVONI  mg tab      US ABD LTD      PROTHROMBIN TIME + INR      DRUG SCREEN, URINE      CBC WITH AUTOMATED DIFF      METABOLIC PANEL, COMPREHENSIVE      HEP C FIBROSURE      HEPATITIS A AB, IGM & TOTAL      HEP B SURFACE AB      HEP B SURFACE AG      HCV RT-PCR, QUANT (NON-GRAPH)   2. Left wrist pain M25.532 719.43    3. Urinary tract infection symptoms R39.9 788.99 URINALYSIS W/ RFLX MICROSCOPIC      CULTURE, URINE      trimethoprim-sulfamethoxazole (BACTRIM)  mg per tablet     Diagnoses and all orders for this visit:    1. Chronic hepatitis C without hepatic coma ([facility] [address]  - Given information on Harvoni, reviewed treatment. Copy of Hep B vaccines obtained. The patient to proceed to get Liver US. She is to follow up with [facility] for Harvoni and Hep C education and to get preliminary labs. -     HARVONI  mg tab; Take 1 Tab by mouth daily. Indications: CHRONIC HEPATITIS C - GENOTYPE 1  -     US ABD LTD; Future  -     PROTHROMBIN TIME + INR; Future  -     DRUG SCREEN, URINE; Future  -     CBC WITH AUTOMATED DIFF; Future  -     METABOLIC PANEL, COMPREHENSIVE; Future  -     HEP C FIBROSURE; Future  -     HEPATITIS A AB, IGM & TOTAL; Future  -     HEP B SURFACE AB; Future  -     HEP B SURFACE AG; Future  -     HCV RT-PCR, QUANT (NON-GRAPH); Future    2. Left wrist pain  - Pt Scheduled to see Orthopedic Surgeon today to review MRI Results     3. Urinary tract infection symptoms  -     URINALYSIS W/ RFLX MICROSCOPIC; Future  -     CULTURE, URINE; Future  -     trimethoprim-sulfamethoxazole (BACTRIM)  mg per tablet; Take 1 Tab by mouth two (2) times a day for 3 days. Follow-up Disposition:  Return in about 1 week (around 1/23/2018).   reviewed medications and side effects in detail        Medications Discontinued During This Encounter   Medication Reason    ledipasvir-sofosbuvir (HARVONI)  mg tab Reorder       Written instructions followed our verbal discussion of all information discussed above, pending tests ordered and future goals/plans. Patient expressed understanding of current diagnosis, planned testing, follow up and if needed to contact the office for any questions or concerns prior to the next visit.     Call [facility] for Financial Assistance

## 2018-01-16 NOTE — MR AVS SNAPSHOT
[address] [facility] [address] [phone] Patient: Pritesh Reyes MRN: [id number] IMS:[id number] Visit Information Date & Time Provider Department Dept. Phone Encounter #  
 1/16/2018  8:30 AM Lisset Pinto NP [address] [facility] [id number] Your Appointments 1/16/2018 11:20 AM  
Follow Up with Hector Manning MD  
[address] [facility], [address] [facility] ([address]) Appt Note: mri f/u will bring disk [address], [address] 200 [facility]  
[phone] 1212 [facility], [address]  
  
    
 1/24/2018 10:30 AM  
CONSULT with NURSE NAVIGATOR [address] [place] [address] [address] [facility] ([address]) Appt Note: harvoni; harvoni  
 [address] [facility] [address]  
129 [facility] [address]  
  
    
 4/24/2018  2:30 PM  
Follow Up with Lisset Pinto NP [address] [facility] ([address]) Appt Note: 3 mth follow up  
 [address] [facility] [address]  
[address] [facility] [address] Upcoming Health Maintenance Date Due DTaP/Tdap/Td series (1 - Tdap) 12/14/1978 ZOSTER VACCINE AGE 60> 10/14/2017 PAP AKA CERVICAL CYTOLOGY 1/6/2018 Pneumococcal 19-64 Medium Risk (1 of 1 - PPSV23) 1/24/2018* FOBT Q 1 YEAR AGE 50-75 7/23/2018 BREAST CANCER SCRN MAMMOGRAM 3/7/2019 *Topic was postponed. The date shown is not the original due date. Allergies as of 1/16/2018  Review Complete On: 1/16/2018 By: Amelie Felipe Severity Noted Reaction Type Reactions Compazine [Prochlorperazine]  06/07/2013    Hives Current Immunizations  Reviewed on 11/18/2015 Name Date Influenza Vaccine (Quad) PF 10/17/2016, 11/18/2015 Influenza Vaccine PF 10/31/2013 Not reviewed this visit You Were Diagnosed With   
  
 Codes Comments Chronic hepatitis C without hepatic coma (HCC)    -  Primary ICD-10-CM: B18.2 ICD-9-CM: 070.54 Left wrist pain     ICD-10-CM: M25.532 ICD-9-CM: 719.43 Urinary tract infection symptoms     ICD-10-CM: R39.9 ICD-9-CM: 788.99 Vitals BP Pulse Temp Resp Height(growth percentile) Weight(growth percentile) (!) 165/98 (!) 108 97.6 °F (36.4 °C) 20 5' 7\" (1.702 m) 233 lb 12.8 oz (106.1 kg) SpO2 BMI OB Status Smoking Status 97% 36.62 kg/m2 Postmenopausal Current Every Day Smoker Vitals History BMI and BSA Data Body Mass Index Body Surface Area  
 36.62 kg/m 2 2.24 m 2 Preferred Pharmacy Pharmacy Name Phone [phone] [employer] [phone] Your Updated Medication List  
  
   
This list is accurate as of: 1/16/18  8:58 AM.  Always use your most recent med list.  
  
  
  
  
 albuterol 90 mcg/actuation inhaler Commonly known as:  PROAIR HFA Take 1-2 Puffs by inhalation every four (4) hours as needed for Wheezing. clobetasol 0.05 % ointment Commonly known as:  Christine Pae Apply  to affected area two (2) times a day. dexlansoprazole 30 mg capsule Commonly known as:  DEXILANT Take 1 Cap by mouth daily as needed. Indications: GASTROESOPHAGEAL REFLUX  
  
 fluticasone-salmeterol 100-50 mcg/dose diskus inhaler Commonly known as:  ADVAIR Take 1 Puff by inhalation every twelve (12) hours. HARVONI  mg Tab Generic drug:  ledipasvir-sofosbuvir Take 1 Tab by mouth daily. Indications: CHRONIC HEPATITIS C - GENOTYPE 1  
  
 ibuprofen 800 mg tablet Commonly known as:  MOTRIN Take 1 Tab by mouth every eight (8) hours as needed for Pain. Indications: Pain  
  
 magnesium oxide 400 mg tablet Commonly known as:  MAG-OX Take 1 Tab by mouth daily. meloxicam 15 mg tablet Commonly known as:  MOBIC Take 1 Tab by mouth daily (with breakfast). mometasone 50 mcg/actuation nasal spray Commonly known as:  NASONEX  
2 Sprays by Both Nostrils route daily. olmesartan-hydroCHLOROthiazide 40-25 mg per tablet Commonly known as:  BENICAR HCT Take 1 Tab by mouth daily. Indications: Hypertension  
  
 travoprost 0.004 % ophthalmic solution Commonly known as:  TRAVATAN Z Administer 1 Drop to both eyes every evening. Indications: OPEN ANGLE GLAUCOMA  
  
 trimethoprim-sulfamethoxazole  mg per tablet Commonly known as:  BACTRIM Take 1 Tab by mouth two (2) times a day for 3 days. verapamil 120 mg tablet Commonly known as:  CALAN  
TAKE ONE TABLET BY MOUTH TWICE A DAY  
  
 VITAMIN D2 50,000 unit capsule Generic drug:  ergocalciferol TAKE ONE CAPSULE BY MOUTH EVERY 7 DAYS Prescriptions Printed Refills HARVONI  mg tab 3 Sig: Take 1 Tab by mouth daily. Indications: CHRONIC HEPATITIS C - GENOTYPE 1 Class: Program  
 Route: Oral  
  
Prescriptions Sent to Mail Order Refills HARVONI  mg tab 3 Sig: Take 1 Tab by mouth daily. Indications: CHRONIC HEPATITIS C - GENOTYPE 1 Class: Program  
 Pharmacy: [address] Ph #: [phone] Route: Oral  
  
Prescriptions Sent to Pharmacy Refills HARVONI  mg tab 3 Sig: Take 1 Tab by mouth daily. Indications: CHRONIC HEPATITIS C - GENOTYPE 1 Class: Program  
 Pharmacy: [address] Ph #: [phone] Route: Oral  
 trimethoprim-sulfamethoxazole (BACTRIM)  mg per tablet 0 Sig: Take 1 Tab by mouth two (2) times a day for 3 days. Class: Normal  
 Pharmacy: [facility] [address] Ph #: [phone]  Route: Oral  
  
To-Do List   
 01/16/2018 Lab:  HCV RT-PCR, QUANT (NON-GRAPH)   
  
 01/16/2018 Imaging:  US ABD LTD   
  
 01/23/2018 10:00 AM  
  Appointment with [address] at [address] [place] [address] ([phone]) OUTSIDE FILMS  - Any outside films related to the study being scheduled should be brought with you on the day of the exam.  If this cannot be done there may be a delay in the reading of the study. NPO -Patient must be NPO (no food or drink) 8 hours prior to the exam.  MEDICATIONS  - Patient must bring a complete list of all medications currently taking to include prescriptions, over-the-counter meds, herbals, vitamins & any dietary supplements 01/30/2018 Lab:  CBC WITH AUTOMATED DIFF   
  
 01/30/2018 Lab:  DRUG SCREEN, URINE   
  
 01/30/2018 Lab:  HEP B SURFACE AB   
  
 01/30/2018 Lab:  HEP B SURFACE AG   
  
 01/30/2018 Lab:  HEP C Francisco Javier Ra   
  
 01/30/2018 Lab:  HEPATITIS A AB, IGM & TOTAL   
  
 01/30/2018 Lab:  METABOLIC PANEL, COMPREHENSIVE   
  
 01/30/2018 Lab:  PROTHROMBIN TIME + INR Patient Instructions Ledipasvir/Sofosbuvir (By mouth) Ledipasvir (le-DIP-as-vir), Sofosbuvir (cov-YGK-yjt-vir) Treats chronic hepatitis C. Used together with or without ribavirin. Brand Name(s): Ernestine Pressley There may be other brand names for this medicine. When This Medicine Should Not Be Used: This medicine is not right for everyone. Do not use it together with ribavirin if you are pregnant or your sexual partner is pregnant. How to Use This Medicine:  
Tablet · Your doctor will tell you how much medicine to use. Do not use more than directed. · Read and follow the patient instructions that come with this medicine. Talk to your doctor or pharmacist if you have any questions. · Missed dose: Take a dose as soon as you remember. If it is almost time for your next dose, wait until then and take a regular dose.  Do not take extra medicine to make up for a missed dose. Do not take more than 1 tablet per day. · Store the medicine in a closed container at room temperature, away from heat, moisture, and direct light. Keep the medicine in its original bottle. Drugs and Foods to Avoid: Ask your doctor or pharmacist before using any other medicine, including over-the-counter medicines, vitamins, and herbal products. · Amiodarone can cause serious side effects if used together with sofosbuvir or if you have used it recently. · Some medicines can affect how this medicine works. Tell your doctor if you are using any of the following: ¨ Digoxin, rifabutin, rifampin, rifapentine, rosuvastatin, simeprevir, tenofovir, tipranavir/ritonavir, Campbell's wort ¨ Blood pressure medicine ¨ Cancer medicine ¨ Medicine to treat seizures (including carbamazepine, oxcarbazepine, phenobarbital, phenytoin) Amee Harrison Steroid medicine ¨ Stomach medicine (including famotidine, omeprazole) · If you take an antacid that contains aluminum or magnesium, take it 4 hours before or after you take this medicine. Warnings While Using This Medicine: · Ribavirin may cause birth defects if either partner is using it during conception or pregnancy. Tell your doctor right away if you or your partner becomes pregnant. Follow all directions about birth control. · Tell your doctor if you are breastfeeding, or if you have kidney disease, other liver disease (including hepatitis B), or heart disease. Tell your doctor if you had a liver transplant. · Your doctor will do lab tests at regular visits to check on the effects of this medicine. Keep all appointments. · Keep all medicine out of the reach of children. Never share your medicine with anyone. Possible Side Effects While Using This Medicine:  
Call your doctor right away if you notice any of these side effects: · Allergic reaction: Itching or hives, swelling in your face or hands, swelling or tingling in your mouth or throat, chest tightness, trouble breathing · Chest pain, slow heartbeat · Lightheadedness or fainting If you notice these less serious side effects, talk with your doctor: · Diarrhea, nausea · Headache, trouble sleeping · Tiredness, weakness If you notice other side effects that you think are caused by this medicine, tell your doctor. Call your doctor for medical advice about side effects. You may report side effects to FDA at 0-571-XEO-3326 © 2017 [facility] Information is for End User's use only and may not be sold, redistributed or otherwise used for commercial purposes. The above information is an  only. It is not intended as medical advice for individual conditions or treatments. Talk to your doctor, nurse or pharmacist before following any medical regimen to see if it is safe and effective for you. Introducing [facility]! Dear Michael Solo: Thank you for requesting a [employer] account. Our records indicate that you already have an active [employer] account. You can access your account anytime at [email or web address] Did you know that you can access your hospital and ER discharge instructions at any time in [employer]? You can also review all of your test results from your hospital stay or ER visit. Additional Information If you have questions, please visit the Frequently Asked Questions section of the [employer] website at [email or web address] Remember, [employer] is NOT to be used for urgent needs. For medical emergencies, dial 911. Now available from your iPhone and Android! Please provide this summary of care documentation to your next provider. Your primary care clinician is listed as Rosa Michael. If you have any questions after today's visit, please call [phone].

## 2018-01-16 NOTE — PROGRESS NOTES
Dillon Cotton  1955   Chief Complaint   Patient presents with    Wrist Pain     LEFT        HISTORY OF PRESENT ILLNESS  Dillon Cotton is a 63 y.o. female who presents today for reevaluation of left wrist pain and to review MRI results. Patient rates pain as 4/10 today. She has been wearing a wrist brace. Patient was given a prescription for Mobic. Her swelling persists and has worsened. She continues to have tenderness and shooting pain up her forearm. Patient denies any fever, chills, chest pain, shortness of breath or calf pain. There are no new illness or injuries to report since last seen in the office. There are no changes to medications, allergies, family or social history. PHYSICAL EXAM:   Visit Vitals    /79    Pulse 85    Temp 97.4 °F (36.3 °C) (Oral)    Ht 5' 7\" (1.702 m)    Wt 233 lb (105.7 kg)    SpO2 93%    BMI 36.49 kg/m2     The patient is a well-developed, well-nourished female   in no acute distress. The patient is alert and oriented times three. The patient is alert and oriented times three. Mood and affect are normal.  LYMPHATIC: lymph nodes are not enlarged and are within normal limits  SKIN: normal in color and non tender to palpation. There are no bruises or abrasions noted. NEUROLOGICAL: Motor sensory exam is within normal limits. Reflexes are equal bilaterally.  There is normal sensation to pinprick and light touch  MUSCULOSKELETAL:  Examination Left Hand   Skin Intact   Deformity -   Swelling -   Tenderness -   Tenderness A1 Pulley -   Finger flexion Full   Finger extension Full   Thenar Eminence Atrophy -   Sensation Normal   Capillary refill -   Heberden's nodes -   Dupuytren's -      Examination Left Wrist   Skin Intact   Tenderness diffuse   Flexion 20   Extension 20   Deformity +   Effusion +   Tinnel's sign -   Phalen's test -   Finklestein maneuver -   Pain with thumb abduction -        IMAGING: MRI of the left wrist dated 12/28/17 was reviewed and read:   IMPRESSION  Diffuse abnormal signal, probable sclerosis and loss of height of the lunate,  most compatible with avascular necrosis. More focal linear T2 hyperintense  signal within the dorsal lunate, favor abnormal signal related to osteonecrosis  rather than nondisplaced fracture. Please correlate with point tenderness in  this region. Increased signal and slight widening of the scapholunate joint space, may be  related to degeneration or possibly partial thickness tear. Trace distal radioulnar joint effusion. Focus of increased signal within the  radial attachment of the TFC complex concerning for small tear/perforation. Mild degenerative changes of the wrist. Likely small radiocarpal joint effusion  with synovitis. XR of the left wrist dated 12/26/17 was reviewed and read: increased distance in the scapholunate joint, about 4mm. XR of the left wrist dated 10/18/17 was reviewed and read: 4 views with scaphoid view show no acute abnormalities.     XR of the left wrist dated 9/25/17 was reviewed and read:   IMPRESSION:  1. Possible nondisplaced fracture of the anterior lunate as above. Slightly  prominent scapholunate interval, possibly ligamental injury or laxity. IMPRESSION:      ICD-10-CM ICD-9-CM    1. Avascular necrosis of lunate (HCC) M87.039 733.49 REFERRAL TO HAND SURGERY    left        PLAN:   1. I discussed the results of the MRI and the treatment options with the patient. The patient's left wrist pain is coming from MRI documented avascular necrosis of the lunate. I will need her to follow up with a hand specialist to discuss further surgical treatment. Risk factors include: htn  2. No cortisone injection indicated today   3. No Physical/Occupational Therapy indicated today  4. No diagnostic test indicated today   5. No durable medical equipment indicated today   6. Yes referral indicated today HAND SURGERY  7. No medications indicated today  8.  No Narcotic indicated today    RTC PRN  Follow-up Disposition: Not on File    Scribed by Juan Phipps [address]) as dictated by Mary Wiseman MD    I, DrAkhil Wiseman, confirm that all documentation is accurate.     Mary Wiseman M.D.   [facility]

## 2018-01-16 NOTE — PATIENT INSTRUCTIONS
Ledipasvir/Sofosbuvir (By mouth)   Ledipasvir (le-DIP-as-vir), Sofosbuvir (kgr-VDF-sec-vir)  Treats chronic hepatitis C. Used together with or without ribavirin. Brand Name(s): Jose Antonio   There may be other brand names for this medicine. When This Medicine Should Not Be Used: This medicine is not right for everyone. Do not use it together with ribavirin if you are pregnant or your sexual partner is pregnant. How to Use This Medicine:   Tablet  · Your doctor will tell you how much medicine to use. Do not use more than directed. · Read and follow the patient instructions that come with this medicine. Talk to your doctor or pharmacist if you have any questions. · Missed dose: Take a dose as soon as you remember. If it is almost time for your next dose, wait until then and take a regular dose. Do not take extra medicine to make up for a missed dose. Do not take more than 1 tablet per day. · Store the medicine in a closed container at room temperature, away from heat, moisture, and direct light. Keep the medicine in its original bottle. Drugs and Foods to Avoid:   Ask your doctor or pharmacist before using any other medicine, including over-the-counter medicines, vitamins, and herbal products. · Amiodarone can cause serious side effects if used together with sofosbuvir or if you have used it recently. · Some medicines can affect how this medicine works. Tell your doctor if you are using any of the following:  ¨ Digoxin, rifabutin, rifampin, rifapentine, rosuvastatin, simeprevir, tenofovir, tipranavir/ritonavir, Campbell's wort  ¨ Blood pressure medicine  ¨ Cancer medicine  ¨ Medicine to treat seizures (including carbamazepine, oxcarbazepine, phenobarbital, phenytoin)  ¨ Steroid medicine  ¨ Stomach medicine (including famotidine, omeprazole)  · If you take an antacid that contains aluminum or magnesium, take it 4 hours before or after you take this medicine.   Warnings While Using This Medicine:   · Ribavirin may cause birth defects if either partner is using it during conception or pregnancy. Tell your doctor right away if you or your partner becomes pregnant. Follow all directions about birth control. · Tell your doctor if you are breastfeeding, or if you have kidney disease, other liver disease (including hepatitis B), or heart disease. Tell your doctor if you had a liver transplant. · Your doctor will do lab tests at regular visits to check on the effects of this medicine. Keep all appointments. · Keep all medicine out of the reach of children. Never share your medicine with anyone. Possible Side Effects While Using This Medicine:   Call your doctor right away if you notice any of these side effects:  · Allergic reaction: Itching or hives, swelling in your face or hands, swelling or tingling in your mouth or throat, chest tightness, trouble breathing  · Chest pain, slow heartbeat  · Lightheadedness or fainting  If you notice these less serious side effects, talk with your doctor:   · Diarrhea, nausea  · Headache, trouble sleeping  · Tiredness, weakness  If you notice other side effects that you think are caused by this medicine, tell your doctor. Call your doctor for medical advice about side effects. You may report side effects to FDA at 6-985-FDA-7731  © 2017 [address] Information is for End User's use only and may not be sold, redistributed or otherwise used for commercial purposes. The above information is an  only. It is not intended as medical advice for individual conditions or treatments. Talk to your doctor, nurse or pharmacist before following any medical regimen to see if it is safe and effective for you.

## 2018-01-19 LAB
BACTERIA SPEC CULT: ABNORMAL
BACTERIA SPEC CULT: ABNORMAL
SERVICE CMNT-IMP: ABNORMAL

## 2018-01-22 NOTE — PROGRESS NOTES
Called patient to give culture results. Patient understand that she was negative for UTI but has some yeast.  Patient will purchase Monistat 7 OTC today.

## 2018-01-22 NOTE — PROGRESS NOTES
Please notify the patient that her urine did not show any signs of infection but rather a yeast infection. Please advise her to purchase 7 day monistat OTC for treatment.

## 2018-01-23 ENCOUNTER — HOSPITAL ENCOUNTER (OUTPATIENT)
Dept: ULTRASOUND IMAGING | Age: 63
Discharge: HOME | End: 2018-01-23
Attending: NURSE PRACTITIONER
Payer: SUBSIDIZED

## 2018-01-23 DIAGNOSIS — B18.2 CHRONIC HEPATITIS C WITHOUT HEPATIC COMA (HCC): ICD-10-CM

## 2018-01-23 PROCEDURE — 76705 ECHO EXAM OF ABDOMEN: CPT

## 2018-01-24 ENCOUNTER — OFFICE VISIT (OUTPATIENT)
Dept: FAMILY MEDICINE CLINIC | Age: 63
End: 2018-01-24

## 2018-01-24 ENCOUNTER — TELEPHONE (OUTPATIENT)
Dept: FAMILY MEDICINE CLINIC | Age: 63
End: 2018-01-24

## 2018-01-24 DIAGNOSIS — Z71.9 HEALTH EDUCATION/COUNSELING: Primary | ICD-10-CM

## 2018-01-24 NOTE — TELEPHONE ENCOUNTER
Medication: Benicar HCT 40-25  mg, dose: 1 tab, how often: daily, current number of medication days provided: 90, refill per application. Lot # [id number], EXP.05/19. This medication was received and verified for the following 1. Correct Patient, 2. Correct Diagnosis, 3. Correct Drug, 4. Correct route, and no current allergy to medication. Please contact patient to come  their medications.      Will Obrien, MSN, RN, [facility]

## 2018-01-24 NOTE — PROGRESS NOTES
Marion Peraza is a 63 y.o. female is here for Nurse Navigator visit for orientation of Harvoni treatment to begin once Hepatitis vaccines are completed. Pt states she needs the Hep A vaccination. She has already had her U/S of liver. Discussed: importance of medication compliance (do not miss doses and do not have a break in treatment), possible side effects reviewed (HA, insomnia, n/v), necessity of periodic labs and NN visits to ascertain how treatment is progressing, no Tylenol, no alcohol, no illicit drug use, safe sex. Advised to return to NN for any problems - no appt needed. Ms. Stefany El was encouraged to call for further concerns or questions. She verbalizes understanding of instructions and with plan of care/treatment.

## 2018-01-25 DIAGNOSIS — B18.2 CHRONIC HEPATITIS C WITHOUT HEPATIC COMA (HCC): Primary | ICD-10-CM

## 2018-02-01 ENCOUNTER — TELEPHONE (OUTPATIENT)
Dept: FAMILY MEDICINE CLINIC | Age: 63
End: 2018-02-01

## 2018-02-01 NOTE — PROGRESS NOTES
Pt Liver ultrasound showed cirrhosis. Pt still needs to bring in pay stubs to qualify for harvoni, when she brings in those we can proceed with preliminary labs for treatment.

## 2018-02-14 ENCOUNTER — TELEPHONE (OUTPATIENT)
Dept: FAMILY MEDICINE CLINIC | Age: 63
End: 2018-02-14

## 2018-02-15 ENCOUNTER — DOCUMENTATION ONLY (OUTPATIENT)
Dept: FAMILY MEDICINE CLINIC | Age: 63
End: 2018-02-15

## 2018-02-15 NOTE — PROGRESS NOTES
Patient called and asked to come in to office for preliminary lab work pre Jose Antonio. Pt will states she will be in on Monday 2/22.

## 2018-02-15 NOTE — TELEPHONE ENCOUNTER
Medication: Travatan 0.004% , dose: 1 gtt OU, how often: Every evening , current number of medication days provided: 90, refill per application. Lot #: [id number], EXP.02/19. This medication was received and verified for the following 1. Correct Patient, 2. Correct Diagnosis, 3. Correct Drug, 4. Correct route, and no current allergy to medication. Medication: Harvoni 90/400  , dose: 1 tab, how often: qd , current number of medication days provided: 90, refill per application. Lot #: [id number], EXP.11/20. This medication was received and verified for the following 1. Correct Patient, 2. Correct Diagnosis, 3. Correct Drug, 4. Correct route, and no current allergy to medication.      Pt needs to come in to get prelim labs prior to treatment       Roxanna Metzger, MSN, RN, [facility]

## 2018-02-19 ENCOUNTER — HOSPITAL ENCOUNTER (OUTPATIENT)
Dept: LAB | Age: 63
Discharge: HOME | End: 2018-02-19

## 2018-02-19 ENCOUNTER — LAB ONLY (OUTPATIENT)
Dept: FAMILY MEDICINE CLINIC | Age: 63
End: 2018-02-19

## 2018-02-19 DIAGNOSIS — B18.2 CHRONIC HEPATITIS C WITHOUT HEPATIC COMA (HCC): ICD-10-CM

## 2018-02-19 DIAGNOSIS — B18.2 CHRONIC HEPATITIS C WITHOUT HEPATIC COMA (HCC): Primary | ICD-10-CM

## 2018-02-19 LAB
ALBUMIN SERPL-MCNC: 2.5 G/DL (ref 3.4–5)
ALBUMIN/GLOB SERPL: 0.6 {RATIO} (ref 0.8–1.7)
ALP SERPL-CCNC: 86 U/L (ref 45–117)
ALT SERPL-CCNC: 126 U/L (ref 13–56)
AMPHET UR QL SCN: NEGATIVE
ANION GAP SERPL CALC-SCNC: 7 MMOL/L (ref 3–18)
AST SERPL-CCNC: 143 U/L (ref 15–37)
BARBITURATES UR QL SCN: NEGATIVE
BASOPHILS # BLD: 0 K/UL (ref 0–0.06)
BASOPHILS NFR BLD: 0 % (ref 0–2)
BENZODIAZ UR QL: NEGATIVE
BILIRUB SERPL-MCNC: 0.6 MG/DL (ref 0.2–1)
BUN SERPL-MCNC: 11 MG/DL (ref 7–18)
BUN/CREAT SERPL: 15 (ref 12–20)
CALCIUM SERPL-MCNC: 7.9 MG/DL (ref 8.5–10.1)
CANNABINOIDS UR QL SCN: NEGATIVE
CHLORIDE SERPL-SCNC: 111 MMOL/L (ref 100–108)
CO2 SERPL-SCNC: 24 MMOL/L (ref 21–32)
COCAINE UR QL SCN: NEGATIVE
CREAT SERPL-MCNC: 0.71 MG/DL (ref 0.6–1.3)
DIFFERENTIAL METHOD BLD: ABNORMAL
EOSINOPHIL # BLD: 0.3 K/UL (ref 0–0.4)
EOSINOPHIL NFR BLD: 7 % (ref 0–5)
ERYTHROCYTE [DISTWIDTH] IN BLOOD BY AUTOMATED COUNT: 13 % (ref 11.6–14.5)
GLOBULIN SER CALC-MCNC: 3.9 G/DL (ref 2–4)
GLUCOSE SERPL-MCNC: 129 MG/DL (ref 74–99)
HBV SURFACE AB SER QL IA: POSITIVE
HBV SURFACE AB SERPL IA-ACNC: >1000 MIU/ML
HBV SURFACE AG SER QL: <0.1 INDEX
HBV SURFACE AG SER QL: NEGATIVE
HCT VFR BLD AUTO: 37.8 % (ref 35–45)
HDSCOM,HDSCOM: NORMAL
HEP BS AB COMMENT,HBSAC: NORMAL
HGB BLD-MCNC: 13 G/DL (ref 12–16)
INR PPP: 1.1 (ref 0.8–1.2)
LYMPHOCYTES # BLD: 1.5 K/UL (ref 0.9–3.6)
LYMPHOCYTES NFR BLD: 29 % (ref 21–52)
MCH RBC QN AUTO: 31.2 PG (ref 24–34)
MCHC RBC AUTO-ENTMCNC: 34.4 G/DL (ref 31–37)
MCV RBC AUTO: 90.6 FL (ref 74–97)
METHADONE UR QL: NEGATIVE
MONOCYTES # BLD: 0.4 K/UL (ref 0.05–1.2)
MONOCYTES NFR BLD: 8 % (ref 3–10)
NEUTS SEG # BLD: 2.9 K/UL (ref 1.8–8)
NEUTS SEG NFR BLD: 56 % (ref 40–73)
OPIATES UR QL: NEGATIVE
PCP UR QL: NEGATIVE
PLATELET # BLD AUTO: 155 K/UL (ref 135–420)
PMV BLD AUTO: 11.6 FL (ref 9.2–11.8)
POTASSIUM SERPL-SCNC: 3.9 MMOL/L (ref 3.5–5.5)
PROT SERPL-MCNC: 6.4 G/DL (ref 6.4–8.2)
PROTHROMBIN TIME: 14.1 SEC (ref 11.5–15.2)
RBC # BLD AUTO: 4.17 M/UL (ref 4.2–5.3)
SODIUM SERPL-SCNC: 142 MMOL/L (ref 136–145)
WBC # BLD AUTO: 5.2 K/UL (ref 4.6–13.2)

## 2018-02-19 PROCEDURE — 80307 DRUG TEST PRSMV CHEM ANLYZR: CPT | Performed by: NURSE PRACTITIONER

## 2018-02-19 PROCEDURE — 87522 HEPATITIS C REVRS TRNSCRPJ: CPT | Performed by: NURSE PRACTITIONER

## 2018-02-19 PROCEDURE — 87340 HEPATITIS B SURFACE AG IA: CPT | Performed by: NURSE PRACTITIONER

## 2018-02-19 PROCEDURE — 82172 ASSAY OF APOLIPOPROTEIN: CPT | Performed by: NURSE PRACTITIONER

## 2018-02-19 PROCEDURE — 85610 PROTHROMBIN TIME: CPT | Performed by: NURSE PRACTITIONER

## 2018-02-19 PROCEDURE — 86709 HEPATITIS A IGM ANTIBODY: CPT | Performed by: NURSE PRACTITIONER

## 2018-02-19 PROCEDURE — 85025 COMPLETE CBC W/AUTO DIFF WBC: CPT | Performed by: NURSE PRACTITIONER

## 2018-02-19 PROCEDURE — 86706 HEP B SURFACE ANTIBODY: CPT | Performed by: NURSE PRACTITIONER

## 2018-02-19 PROCEDURE — 80053 COMPREHEN METABOLIC PANEL: CPT | Performed by: NURSE PRACTITIONER

## 2018-02-19 NOTE — PROGRESS NOTES
Patient name, date of birth and orders verified before specimen collection. Rt  hand is dry and intact. 21g butterfly  was utilized to collect specimen. Pt tolerated procedure well.

## 2018-02-20 LAB
HAV AB SER QL IA: NEGATIVE
HAV IGM SERPL QL IA: NEGATIVE

## 2018-02-21 ENCOUNTER — TELEPHONE (OUTPATIENT)
Dept: FAMILY MEDICINE CLINIC | Age: 63
End: 2018-02-21

## 2018-02-21 NOTE — TELEPHONE ENCOUNTER
Medication: Advair 100-50 mcg, dose: 1 inhalation, how often: twice daily, current number of medication days provided: 90, refill per application. Lot #: [id number], EXP.05/19. This medication was received and verified for the following 1. Correct Patient, 2. Correct Diagnosis, 3. Correct Drug, 4. Correct route, and no current allergy to medication. Please contact patient to come  their medications.      Sandra Bustillos, MSN, RN, [facility]

## 2018-02-23 LAB
A2 MACROGLOB SERPL-MCNC: 282 MG/DL (ref 110–276)
ALT (SGPT) P5P, 001547: 127 IU/L (ref 0–40)
APO A-I SERPL-MCNC: 145 MG/DL (ref 116–209)
BILIRUB SERPL-MCNC: 0.3 MG/DL (ref 0–1.2)
COMMENT, 550127: ABNORMAL
FIBROSIS SCORE, 550102, HCVF1: 0.49 (ref 0–0.21)
FIBROSIS SCORING:, 550107: ABNORMAL
FIBROSIS STAGE, 550132: ABNORMAL
GGT SERPL-CCNC: 89 IU/L (ref 0–60)
HAPTOGLOB SERPL-MCNC: 59 MG/DL (ref 34–200)
HCV RNA SERPL NAA+PROBE-LOG IU: 5.7 HCV LOG 10IU/ML
HCV RNA SERPL PROBE AMP-ACNC: ABNORMAL HCVIU/ML
INTERPRETATION, 550106: ABNORMAL
LIMITATIONS, 550105: ABNORMAL
NECROINFLAMM ACTIVITY SCORING:, 550121: ABNORMAL
NECROINFLAMMAT ACTIVITY GRADE, 550133: ABNORMAL
NECROINFLAMMAT ACTIVITY SCORE, 550103: 0.73 (ref 0–0.17)

## 2018-02-27 ENCOUNTER — TELEPHONE (OUTPATIENT)
Dept: FAMILY MEDICINE CLINIC | Age: 63
End: 2018-02-27

## 2018-03-01 ENCOUNTER — OFFICE VISIT (OUTPATIENT)
Dept: FAMILY MEDICINE CLINIC | Age: 63
End: 2018-03-01

## 2018-03-01 VITALS
RESPIRATION RATE: 14 BRPM | DIASTOLIC BLOOD PRESSURE: 70 MMHG | HEART RATE: 99 BPM | SYSTOLIC BLOOD PRESSURE: 126 MMHG | TEMPERATURE: 98.4 F | OXYGEN SATURATION: 97 %

## 2018-03-01 DIAGNOSIS — Z71.89 ENCOUNTER FOR MEDICATION COUNSELING: Primary | ICD-10-CM

## 2018-03-01 DIAGNOSIS — Z79.899 ENCOUNTER FOR MEDICATION MANAGEMENT: ICD-10-CM

## 2018-03-01 NOTE — PROGRESS NOTES
Dominique Quigley is a 63 y.o. female is here for Nurse Navigator visit for initation of Harvoni treatment. Consent for treatment signed. She has not obtained Hep A vaccines but Ms. Genesis Antoine would like treatment started today. Pt states she will get Vaccine this coming week. Discussed: importance of medication compliance (do not miss doses and do not have a break in treatment), possibly side effects reviewed (HA, insomnia, n/v), necessity of periodic labs and NN visits to ascertain how treatment is progressing, no Tylenol, no alcohol, no illicit drug use, safe sex. Advised to return to NN for any problems - no appt needed. Ms. Miranda Gabriel was referred to lab and [facility]PC. Follow up appt was provided. Pt is aware to RTO in 3 weeks for next labs. Ms. Miranda Gabriel was encouraged to call for further concerns or questions. Patient verbalized understanding of all instructions and with plan of care/treatment.

## 2018-03-05 ENCOUNTER — TELEPHONE (OUTPATIENT)
Dept: FAMILY MEDICINE CLINIC | Age: 63
End: 2018-03-05

## 2018-03-06 NOTE — TELEPHONE ENCOUNTER
Medication: Nasonex, dose: 2 sprays both nostrils, how often: every day as needed for allergies, current number of medication days provided: 90, refill per application. Lot #: [id number], EXP.02/19. This medication was received and verified for the following 1. Correct Patient, 2. Correct Diagnosis, 3. Correct Drug, 4. Correct route, and no current allergy to medication. Please contact patient to come  their medications.      Marilee Hernández MSN, RN, [facility]

## 2018-03-22 ENCOUNTER — TELEPHONE (OUTPATIENT)
Dept: FAMILY MEDICINE CLINIC | Age: 63
End: 2018-03-22

## 2018-03-22 NOTE — TELEPHONE ENCOUNTER
Medication: Harvoni 90/400  , dose: 1 tab, how often: qd , current number of medication days provided: 90, refill per application. Lot #: [id number], EXP.08/20. This medication was received and verified for the following 1. Correct Patient, 2. Correct Diagnosis, 3. Correct Drug, 4. Correct route, and no current allergy to medication. Please contact patient to come  their medications.      Nam Jang, MSN, RN, FNP-C     [facility]

## 2018-03-26 ENCOUNTER — TELEPHONE (OUTPATIENT)
Dept: FAMILY MEDICINE CLINIC | Age: 63
End: 2018-03-26

## 2018-03-26 NOTE — TELEPHONE ENCOUNTER
Medication: Dexilant 30 mg  , dose: 1 tab, how often: every day as needed , current number of medication days provided: 90, refill per application. Lot #: [id number], EXP.03/19. This medication was received and verified for the following 1. Correct Patient, 2. Correct Diagnosis, 3. Correct Drug, 4. Correct route, and no current allergy to medication. Please contact patient to come  their medications.      Lianna Cross, MSN, RN, [facility]

## 2018-03-29 ENCOUNTER — DOCUMENTATION ONLY (OUTPATIENT)
Dept: FAMILY MEDICINE CLINIC | Age: 63
End: 2018-03-29

## 2018-03-29 ENCOUNTER — TELEPHONE (OUTPATIENT)
Dept: FAMILY MEDICINE CLINIC | Age: 63
End: 2018-03-29

## 2018-03-29 NOTE — PROGRESS NOTES
Patient called yesterday stating she has enough Harvoni until Monday. She was instructed to come to office and have labs done today so they can be resulted before she picks up next bottle of Harvoni.

## 2018-04-02 ENCOUNTER — TELEPHONE (OUTPATIENT)
Dept: FAMILY MEDICINE CLINIC | Age: 63
End: 2018-04-02

## 2018-04-02 ENCOUNTER — LAB ONLY (OUTPATIENT)
Dept: FAMILY MEDICINE CLINIC | Age: 63
End: 2018-04-02

## 2018-04-02 ENCOUNTER — HOSPITAL ENCOUNTER (OUTPATIENT)
Dept: LAB | Age: 63
Discharge: HOME | End: 2018-04-02

## 2018-04-02 DIAGNOSIS — B18.2 CHRONIC HEPATITIS C WITHOUT HEPATIC COMA (HCC): Primary | ICD-10-CM

## 2018-04-02 DIAGNOSIS — B18.2 CHRONIC HEPATITIS C WITHOUT HEPATIC COMA (HCC): ICD-10-CM

## 2018-04-02 LAB
ALBUMIN SERPL-MCNC: 2.7 G/DL (ref 3.4–5)
ALBUMIN/GLOB SERPL: 0.7 {RATIO} (ref 0.8–1.7)
ALP SERPL-CCNC: 77 U/L (ref 45–117)
ALT SERPL-CCNC: 57 U/L (ref 13–56)
ANION GAP SERPL CALC-SCNC: 6 MMOL/L (ref 3–18)
AST SERPL-CCNC: 61 U/L (ref 15–37)
BASOPHILS # BLD: 0 K/UL (ref 0–0.06)
BASOPHILS NFR BLD: 0 % (ref 0–2)
BILIRUB SERPL-MCNC: 0.5 MG/DL (ref 0.2–1)
BUN SERPL-MCNC: 15 MG/DL (ref 7–18)
BUN/CREAT SERPL: 19 (ref 12–20)
CALCIUM SERPL-MCNC: 8.5 MG/DL (ref 8.5–10.1)
CHLORIDE SERPL-SCNC: 109 MMOL/L (ref 100–108)
CO2 SERPL-SCNC: 28 MMOL/L (ref 21–32)
CREAT SERPL-MCNC: 0.77 MG/DL (ref 0.6–1.3)
DIFFERENTIAL METHOD BLD: ABNORMAL
EOSINOPHIL # BLD: 0.4 K/UL (ref 0–0.4)
EOSINOPHIL NFR BLD: 8 % (ref 0–5)
ERYTHROCYTE [DISTWIDTH] IN BLOOD BY AUTOMATED COUNT: 12.8 % (ref 11.6–14.5)
GLOBULIN SER CALC-MCNC: 3.8 G/DL (ref 2–4)
GLUCOSE SERPL-MCNC: 85 MG/DL (ref 74–99)
HCT VFR BLD AUTO: 38.4 % (ref 35–45)
HGB BLD-MCNC: 13.4 G/DL (ref 12–16)
INR PPP: 1.1 (ref 0.8–1.2)
LYMPHOCYTES # BLD: 2 K/UL (ref 0.9–3.6)
LYMPHOCYTES NFR BLD: 34 % (ref 21–52)
MCH RBC QN AUTO: 31.6 PG (ref 24–34)
MCHC RBC AUTO-ENTMCNC: 34.9 G/DL (ref 31–37)
MCV RBC AUTO: 90.6 FL (ref 74–97)
MONOCYTES # BLD: 0.4 K/UL (ref 0.05–1.2)
MONOCYTES NFR BLD: 6 % (ref 3–10)
NEUTS SEG # BLD: 3 K/UL (ref 1.8–8)
NEUTS SEG NFR BLD: 52 % (ref 40–73)
PLATELET # BLD AUTO: 150 K/UL (ref 135–420)
PMV BLD AUTO: 11.1 FL (ref 9.2–11.8)
POTASSIUM SERPL-SCNC: 3.8 MMOL/L (ref 3.5–5.5)
PROT SERPL-MCNC: 6.5 G/DL (ref 6.4–8.2)
PROTHROMBIN TIME: 13.6 SEC (ref 11.5–15.2)
RBC # BLD AUTO: 4.24 M/UL (ref 4.2–5.3)
SODIUM SERPL-SCNC: 143 MMOL/L (ref 136–145)
WBC # BLD AUTO: 5.7 K/UL (ref 4.6–13.2)

## 2018-04-02 PROCEDURE — 80053 COMPREHEN METABOLIC PANEL: CPT | Performed by: NURSE PRACTITIONER

## 2018-04-02 PROCEDURE — 85610 PROTHROMBIN TIME: CPT | Performed by: NURSE PRACTITIONER

## 2018-04-02 PROCEDURE — 87522 HEPATITIS C REVRS TRNSCRPJ: CPT | Performed by: NURSE PRACTITIONER

## 2018-04-02 PROCEDURE — 85025 COMPLETE CBC W/AUTO DIFF WBC: CPT | Performed by: NURSE PRACTITIONER

## 2018-04-02 NOTE — PROGRESS NOTES
Pt has 1 Harvoni tab left for tomorrow. All labs are good. PCR not available. Pt may have the [facility].

## 2018-04-02 NOTE — PROGRESS NOTES
Called [facility] and spoke with [facility] to cancel the HCV PCR Rt Quant and keep order for Hep C QT PCR with reflex Genotype per Hong Brown NP. Provider to go in chart and cancel the order.

## 2018-04-02 NOTE — PROGRESS NOTES
Pt came in today to have her Harvoni labs drawn (CBC,CMP,INR,PCR) in order to obtain her 2nd bottle of Durwin Slot. HCV RT PCR Quant was printed and collected in error. Order dc'd. Only labs needed today were harvoni standard labs and not the HCV RT PCR Quant. Dejon Barber called lab and requested they not run that test. Due to the EHR, it will appear I oked that lab when I only ok'd CBC,CMP,INR,PCR. Pt failed to come in last week as directed to do in order to have labs obtained and med verified. Labs were placed today and I spoke with pt and strongly encouraged her to come in at the 3rd week of each bottle to meet with NN in order to review lab results and verify med is available so to prevent break in therapy. Pt has 1 tab left for tomorrow. Will review labs when available and send to NN to review with patient. Will CC chart to ANJELICA Webster.

## 2018-04-03 ENCOUNTER — TELEPHONE (OUTPATIENT)
Dept: FAMILY MEDICINE CLINIC | Age: 63
End: 2018-04-03

## 2018-04-03 ENCOUNTER — OFFICE VISIT (OUTPATIENT)
Dept: FAMILY MEDICINE CLINIC | Age: 63
End: 2018-04-03

## 2018-04-03 VITALS
DIASTOLIC BLOOD PRESSURE: 79 MMHG | RESPIRATION RATE: 18 BRPM | SYSTOLIC BLOOD PRESSURE: 156 MMHG | OXYGEN SATURATION: 96 % | HEART RATE: 94 BPM | TEMPERATURE: 98.3 F

## 2018-04-03 DIAGNOSIS — B18.2 HEP C W/O COMA, CHRONIC (HCC): Primary | ICD-10-CM

## 2018-04-03 LAB
HCV GENOTYPE: NORMAL
HCV RNA SERPL NAA+PROBE-ACNC: NORMAL IU/ML
HCV RNA SERPL NAA+PROBE-LOG IU: NORMAL LOG10 IU/ML
TEST INFORMATION, 550045: NORMAL

## 2018-04-03 NOTE — TELEPHONE ENCOUNTER
Medication: Proair 90mcg , dose: 1-2 puffs, how often: 4 as needed , current number of medication days provided: 90, refill per application. Lot #: [id number], EXP.03/19. This medication was received and verified for the following 1. Correct Patient, 2. Correct Diagnosis, 3. Correct Drug, 4. Correct route, and no current allergy to medication. Please contact patient to come  their medications.      Tenzin Nielson, MSN,RN,[facility]

## 2018-04-03 NOTE — PROGRESS NOTES
Kenney Lindsay, it appears your pt is no longer Hep C detected. I have not notified anyone of this. I am letting you make the decision on whether patient should finish the Harvoni therapy or not.  Thank you

## 2018-04-03 NOTE — PROGRESS NOTES
Giovani Alonso is a 63 y.o. female is here for Nurse Navigator visit for re-check of Krystle Farrell treatment    Discussed: importance of medication compliance (do not miss doses and do not have a break in treatment), possibly side effects reviewed (HA, insomnia, n/v), necessity of periodic labs and NN visits to ascertain how treatment is progressing, no Tylenol, no alcohol, no illicit drug use, safe sex. Advised to return to NN for any problems - no appt needed. Pt has had only minimal sl headaches and takes one Naproxen and pain relieved. Pt blood pressure elevated but she has raced here before our closing time, her mother is in [facility]. Pt had labs done yesterday and given the ok for medication today by Ms. Lewis NP. Patient was referred to [facility]. Follow up appt was provided. She was encouraged to call for further concerns or questions. Ms. Kely Hermosillo verbalized understanding of all instructions and with plan of care/treatment.

## 2018-04-03 NOTE — TELEPHONE ENCOUNTER
Medication: Benicar HCT 40/25 mg, dose: 1 tab, how often: daily, current number of medication days provided: 90, refill per application. Lot # [id number], EXP.12/19. This medication was received and verified for the following 1. Correct Patient, 2. Correct Diagnosis, 3. Correct Drug, 4. Correct route, and no current allergy to medication. Please contact patient to come  their medications.      Bertram Saavedra, MSN,RN,[facility]

## 2018-04-16 ENCOUNTER — LAB ONLY (OUTPATIENT)
Dept: FAMILY MEDICINE CLINIC | Age: 63
End: 2018-04-16

## 2018-04-19 ENCOUNTER — TELEPHONE (OUTPATIENT)
Dept: FAMILY MEDICINE CLINIC | Age: 63
End: 2018-04-19

## 2018-04-19 NOTE — TELEPHONE ENCOUNTER
Medication: Harvoni 90/400  , dose: 1 tab, how often: qd , current number of medication days provided: 90, refill per application. Lot #: [id number], EXP.08/20. This medication was received and verified for the following 1. Correct Patient, 2. Correct Diagnosis, 3. Correct Drug, 4. Correct route, and no current allergy to medication. Please contact patient to come  their medications.      Rosalind Nayak, MSN, RN, [facility]

## 2018-04-23 ENCOUNTER — HOSPITAL ENCOUNTER (OUTPATIENT)
Dept: LAB | Age: 63
Discharge: HOME | End: 2018-04-23

## 2018-04-23 ENCOUNTER — OFFICE VISIT (OUTPATIENT)
Dept: FAMILY MEDICINE CLINIC | Age: 63
End: 2018-04-23

## 2018-04-23 DIAGNOSIS — B17.10 ACUTE HEPATITIS C VIRUS INFECTION WITHOUT HEPATIC COMA: Primary | ICD-10-CM

## 2018-04-23 DIAGNOSIS — B17.10 ACUTE HEPATITIS C VIRUS INFECTION WITHOUT HEPATIC COMA: ICD-10-CM

## 2018-04-23 LAB
ALBUMIN SERPL-MCNC: 2.9 G/DL (ref 3.4–5)
ALBUMIN/GLOB SERPL: 0.8 {RATIO} (ref 0.8–1.7)
ALP SERPL-CCNC: 76 U/L (ref 45–117)
ALT SERPL-CCNC: 62 U/L (ref 13–56)
ANION GAP SERPL CALC-SCNC: 6 MMOL/L (ref 3–18)
AST SERPL-CCNC: 69 U/L (ref 15–37)
BASOPHILS # BLD: 0 K/UL (ref 0–0.06)
BASOPHILS NFR BLD: 0 % (ref 0–2)
BILIRUB SERPL-MCNC: 0.4 MG/DL (ref 0.2–1)
BUN SERPL-MCNC: 17 MG/DL (ref 7–18)
BUN/CREAT SERPL: 20 (ref 12–20)
CALCIUM SERPL-MCNC: 8.5 MG/DL (ref 8.5–10.1)
CHLORIDE SERPL-SCNC: 111 MMOL/L (ref 100–108)
CO2 SERPL-SCNC: 26 MMOL/L (ref 21–32)
CREAT SERPL-MCNC: 0.84 MG/DL (ref 0.6–1.3)
DIFFERENTIAL METHOD BLD: ABNORMAL
EOSINOPHIL # BLD: 0.4 K/UL (ref 0–0.4)
EOSINOPHIL NFR BLD: 8 % (ref 0–5)
ERYTHROCYTE [DISTWIDTH] IN BLOOD BY AUTOMATED COUNT: 12.6 % (ref 11.6–14.5)
GLOBULIN SER CALC-MCNC: 3.8 G/DL (ref 2–4)
GLUCOSE SERPL-MCNC: 124 MG/DL (ref 74–99)
HCT VFR BLD AUTO: 36.4 % (ref 35–45)
HGB BLD-MCNC: 13 G/DL (ref 12–16)
INR PPP: 1.1 (ref 0.8–1.2)
LYMPHOCYTES # BLD: 1.6 K/UL (ref 0.9–3.6)
LYMPHOCYTES NFR BLD: 36 % (ref 21–52)
MCH RBC QN AUTO: 31.7 PG (ref 24–34)
MCHC RBC AUTO-ENTMCNC: 35.7 G/DL (ref 31–37)
MCV RBC AUTO: 88.8 FL (ref 74–97)
MONOCYTES # BLD: 0.4 K/UL (ref 0.05–1.2)
MONOCYTES NFR BLD: 9 % (ref 3–10)
NEUTS SEG # BLD: 2.1 K/UL (ref 1.8–8)
NEUTS SEG NFR BLD: 47 % (ref 40–73)
PLATELET # BLD AUTO: 136 K/UL (ref 135–420)
PMV BLD AUTO: 11.2 FL (ref 9.2–11.8)
POTASSIUM SERPL-SCNC: 3.9 MMOL/L (ref 3.5–5.5)
PROT SERPL-MCNC: 6.7 G/DL (ref 6.4–8.2)
PROTHROMBIN TIME: 14 SEC (ref 11.5–15.2)
RBC # BLD AUTO: 4.1 M/UL (ref 4.2–5.3)
SODIUM SERPL-SCNC: 143 MMOL/L (ref 136–145)
WBC # BLD AUTO: 4.5 K/UL (ref 4.6–13.2)

## 2018-04-23 PROCEDURE — 80053 COMPREHEN METABOLIC PANEL: CPT | Performed by: NURSE PRACTITIONER

## 2018-04-23 PROCEDURE — 85610 PROTHROMBIN TIME: CPT | Performed by: NURSE PRACTITIONER

## 2018-04-23 PROCEDURE — 85025 COMPLETE CBC W/AUTO DIFF WBC: CPT | Performed by: NURSE PRACTITIONER

## 2018-04-23 NOTE — PROGRESS NOTES
Labs drawn on first attempt in right upper arm. Three identifiers used for confirmation: name,  and last of SSN. Lab orders verified.

## 2018-04-24 ENCOUNTER — OFFICE VISIT (OUTPATIENT)
Dept: FAMILY MEDICINE CLINIC | Age: 63
End: 2018-04-24

## 2018-04-24 VITALS
SYSTOLIC BLOOD PRESSURE: 143 MMHG | OXYGEN SATURATION: 100 % | WEIGHT: 232.6 LBS | DIASTOLIC BLOOD PRESSURE: 87 MMHG | BODY MASS INDEX: 36.51 KG/M2 | HEART RATE: 94 BPM | RESPIRATION RATE: 16 BRPM | TEMPERATURE: 98.1 F | HEIGHT: 67 IN

## 2018-04-24 DIAGNOSIS — I10 ESSENTIAL HYPERTENSION WITH GOAL BLOOD PRESSURE LESS THAN 140/90: Primary | ICD-10-CM

## 2018-04-24 DIAGNOSIS — Z00.00 ROUTINE HEALTH MAINTENANCE: ICD-10-CM

## 2018-04-24 DIAGNOSIS — B18.2 CHRONIC HEPATITIS C WITHOUT HEPATIC COMA (HCC): ICD-10-CM

## 2018-04-24 DIAGNOSIS — M17.0 PRIMARY OSTEOARTHRITIS OF BOTH KNEES: ICD-10-CM

## 2018-04-24 RX ORDER — MELOXICAM 15 MG/1
15 TABLET ORAL DAILY
Qty: 30 TAB | Refills: 1 | Status: SHIPPED | OUTPATIENT
Start: 2018-04-24 | End: 2018-04-24 | Stop reason: SDUPTHER

## 2018-04-24 RX ORDER — OLMESARTAN MEDOXOMIL AND HYDROCHLOROTHIAZIDE 40/25 40; 25 MG/1; MG/1
1 TABLET ORAL DAILY
Qty: 30 TAB | Refills: 6 | Status: SHIPPED | OUTPATIENT
Start: 2018-04-24 | End: 2019-04-16 | Stop reason: SDUPTHER

## 2018-04-24 RX ORDER — MELOXICAM 15 MG/1
15 TABLET ORAL DAILY
Qty: 30 TAB | Refills: 1 | Status: SHIPPED | OUTPATIENT
Start: 2018-04-24 | End: 2021-03-22

## 2018-04-24 RX ORDER — VERAPAMIL HYDROCHLORIDE 120 MG/1
TABLET, FILM COATED ORAL
Qty: 60 TAB | Refills: 6 | Status: SHIPPED | OUTPATIENT
Start: 2018-04-24 | End: 2018-10-23 | Stop reason: SDUPTHER

## 2018-04-24 NOTE — LETTER
4/24/2018 [address] [facility] [address] [facility], 1955, is picking up the following medications ordered from the [facility]. 
 
HARVONI 90/400 MG-MG QUANTITY: [id number] [facility], [facility] Patient's Signature:_________________________________________________ Today's Date: 4/24/2018

## 2018-04-24 NOTE — PROGRESS NOTES
Subjective:     Alie Barrera is a 63 y.o. female who presents for follow up of hypertension. Diet and Lifestyle: generally follows a low fat low cholesterol diet  Home BP Monitoring: is not measured at home    Cardiovascular ROS: taking medications as instructed, no medication side effects noted, no TIA's, no chest pain on exertion, no dyspnea on exertion, no swelling of ankles. She also presents for follow up of chronic Hepatitis C. Since the last visit, following treatment regimen with good compliance, no reported side effects of regimen. Patient's current symptoms include No side symptoms. Patient denies abdominal pain, anorexia, dark urine, diarrhea, fatigue, headache, jaundice, malaise, pruritis, skin rash, vomiting and weight loss. New concerns: Pt reports her knee arthritis is acting up, she has not taken any medications as she feels it may interact with her Harvoni. Requesting Steroid injections as they have been helpful in the past.     Current Outpatient Prescriptions   Medication Sig Dispense Refill    olmesartan-hydroCHLOROthiazide (BENICAR HCT) 40-25 mg per tablet Take 1 Tab by mouth daily. Indications: hypertension 30 Tab 6    verapamil (CALAN) 120 mg tablet TAKE ONE TABLET BY MOUTH TWICE A DAY 60 Tab 6    meloxicam (MOBIC) 15 mg tablet Take 1 Tab by mouth daily. Indications: OSTEOARTHRITIS 30 Tab 1    HARVONI  mg tab Take 1 Tab by mouth daily. Indications: CHRONIC HEPATITIS C - GENOTYPE 1 30 Tab 3    VITAMIN D2 50,000 unit capsule TAKE ONE CAPSULE BY MOUTH EVERY 7 DAYS 4 Cap 8    fluticasone-salmeterol (ADVAIR) 100-50 mcg/dose diskus inhaler Take 1 Puff by inhalation every twelve (12) hours. 3 Inhaler 3    albuterol (PROAIR HFA) 90 mcg/actuation inhaler Take 1-2 Puffs by inhalation every four (4) hours as needed for Wheezing. 3 Inhaler 3    dexlansoprazole (DEXILANT) 30 mg capsule Take 1 Cap by mouth daily as needed.  Indications: GASTROESOPHAGEAL REFLUX 90 Cap 3    mometasone (NASONEX) 50 mcg/actuation nasal spray 2 Sprays by Both Nostrils route daily. 2 Container 0    magnesium oxide (MAG-OX) 400 mg tablet Take 1 Tab by mouth daily. 30 Tab 11    travoprost (TRAVATAN Z) 0.004 % ophthalmic solution Administer 1 Drop to both eyes every evening. Indications: OPEN ANGLE GLAUCOMA 5 mL 3    clobetasol (TEMOVATE) 0.05 % ointment Apply  to affected area two (2) times a day. Allergies   Allergen Reactions    Compazine [Prochlorperazine] Hives     Past Medical History:   Diagnosis Date    Arthritis of knee, left 2007    Chronic obstructive pulmonary disease ([facility] [address]     mild    Cirrhosis of liver ([facility] [address] 01/24/2018    Stage F2 fibrosis,compensated    Diverticulosis of sigmoid colon 10/2017    Mild diverticulosis    Erythrasma May 2014    beba feet, lower left leg    Glaucoma     H/O mammogram 03/01/2016    normal, f/u in 1 year    Hepatitis C 2002    genotype 1a, previous IV drug user, declined interferons     Hypertension 2002    Positive FIT (fecal immunochemical test) 07/26/2017    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 Moni Conde recommends your next colonoscopy in 5 years.     HX OTHER SURGICAL  2002    liver biopsy    HX TUBAL LIGATION       Family History   Problem Relation Age of Onset    Hypertension Mother     COPD Mother     Other Mother      Poly? nervous system    Cancer Father      rare form bone     Colon Polyps Sister     Colon Polyps Maternal Aunt      Social History   Substance Use Topics    Smoking status: Current Every Day Smoker     Packs/day: 0.50     Types: Cigarettes     Start date: 1/12/1974    Smokeless tobacco: Never Used    Alcohol use No        Lab Results  Component Value Date/Time   WBC 4.5 (L) 04/23/2018 09:49 AM   HGB 13.0 04/23/2018 09:49 AM   HCT 36.4 04/23/2018 09:49 AM   PLATELET 540 89/03/3864 09:49 AM   MCV 88.8 04/23/2018 09:49 AM     Lab Results  Component Value Date/Time   Hemoglobin A1c 4.9 03/24/2015 10:05 AM   Hemoglobin A1c 5.2 12/02/2014 07:52 AM   Glucose 124 (H) 04/23/2018 09:49 AM   LDL, calculated 47.2 12/11/2017 09:22 AM   Creatinine 0.84 04/23/2018 09:49 AM      Lab Results  Component Value Date/Time   Cholesterol, total 112 12/11/2017 09:22 AM   HDL Cholesterol 49 12/11/2017 09:22 AM   LDL, calculated 47.2 12/11/2017 09:22 AM   Triglyceride 79 12/11/2017 09:22 AM   CHOL/HDL Ratio 2.3 12/11/2017 09:22 AM     Lab Results   Component Value Date/Time    Sodium 143 04/23/2018 09:49 AM    Potassium 3.9 04/23/2018 09:49 AM    Chloride 111 (H) 04/23/2018 09:49 AM    CO2 26 04/23/2018 09:49 AM    Anion gap 6 04/23/2018 09:49 AM    Glucose 124 (H) 04/23/2018 09:49 AM    BUN 17 04/23/2018 09:49 AM    Creatinine 0.84 04/23/2018 09:49 AM    BUN/Creatinine ratio 20 04/23/2018 09:49 AM    GFR est AA >60 04/23/2018 09:49 AM    GFR est non-AA >60 04/23/2018 09:49 AM    Calcium 8.5 04/23/2018 09:49 AM    Bilirubin, total 0.4 04/23/2018 09:49 AM    ALT (SGPT) 62 (H) 04/23/2018 09:49 AM    AST (SGOT) 69 (H) 04/23/2018 09:49 AM    Alk. phosphatase 76 04/23/2018 09:49 AM    Protein, total 6.7 04/23/2018 09:49 AM    Albumin 2.9 (L) 04/23/2018 09:49 AM    Globulin 3.8 04/23/2018 09:49 AM    A-G Ratio 0.8 04/23/2018 09:49 AM         Review of Systems, additional:  Pertinent items are noted in HPI.     Objective:     Visit Vitals    /87 (BP 1 Location: Left arm, BP Patient Position: Sitting)    Pulse 94    Temp 98.1 °F (36.7 °C) (Oral)    Resp 16    Ht 5' 7\" (1.702 m)    Wt 232 lb 9.6 oz (105.5 kg)    SpO2 100%    BMI 36.43 kg/m2     Appearance: alert, well appearing, and in no distress. General exam: CVS exam BP noted to be well controlled today in office, S1, S2 normal, no gallop, no murmur, chest clear, no JVD, no HSM, no edema, peripheral vascular exam pedal pulses normal both DP's and PT's, color, temperature, sensation in feet normal, no lesions, neurological exam alert, oriented, normal speech, no focal findings or movement disorder noted. Lab review: labs are reviewed, up to date and normal.     Assessment/Plan:     hypertension stable. repeat labs ordered prior to next appointment  orders and follow up as documented in patient record  reviewed diet, exercise and weight control  recommended sodium restriction  cardiovascular risk and specific lipid/LDL goals reviewed  reviewed medications and side effects in detail. ICD-10-CM ICD-9-CM    1. Essential hypertension with goal blood pressure less than 140/90 I10 401.9 olmesartan-hydroCHLOROthiazide (BENICAR HCT) 40-25 mg per tablet      verapamil (CALAN) 120 mg tablet      LIPID PANEL      CBC W/O DIFF   2. Chronic hepatitis C without hepatic coma (HCC) P03.3 294.86 METABOLIC PANEL, COMPREHENSIVE      HCV RT-PCR, QUANT (NON-GRAPH)   3. Primary osteoarthritis of both knees M17.0 715.16 REFERRAL TO ORTHOPEDICS      meloxicam (MOBIC) 15 mg tablet      DISCONTINUED: meloxicam (MOBIC) 15 mg tablet   4. Routine health maintenance Z00.00 V70.0      Diagnoses and all orders for this visit:    1. Essential hypertension with goal blood pressure less than 140/90  -     olmesartan-hydroCHLOROthiazide (BENICAR HCT) 40-25 mg per tablet; Take 1 Tab by mouth daily. Indications: hypertension  -     verapamil (CALAN) 120 mg tablet; TAKE ONE TABLET BY MOUTH TWICE A DAY  -     LIPID PANEL; Future  -     CBC; Future  - Good Rx coupon given, pt to  at [facility]. 2. Chronic hepatitis C without hepatic coma (HCC)  -     COMP METABOLIC PANEL;  Future  -     HCV RT-PCR, QUANT (NON-GRAPH); Future  - Stable, continue current therapy     3. Primary osteoarthritis of both knees  -     REFERRAL TO ORTHOPEDICS  -     meloxicam (MOBIC) 15 mg tablet; Take 1 Tab by mouth daily. Indications: OSTEOARTHRITIS  - Pt referred to ortho for knee injections     4. Routine health maintenance  -Patient notified to schedule pap/pelvic exam 1 week prior to follow up visit along with labs. Follow-up Disposition:  Return in about 6 months (around 10/24/2018), or if symptoms worsen or fail to improve.

## 2018-04-30 ENCOUNTER — TELEPHONE (OUTPATIENT)
Dept: FAMILY MEDICINE CLINIC | Age: 63
End: 2018-04-30

## 2018-05-01 NOTE — TELEPHONE ENCOUNTER
Medication: Advair 100/50 mcg, dose: 1 inhalation, how often: twice daily, current number of medication days provided: 90, refill per application. Lot #: [id number], EXP.06/19. This medication was received and verified for the following 1. Correct Patient, 2. Correct Diagnosis, 3. Correct Drug, 4. Correct route, and no current allergy to medication. Please contact patient to come  their medications.      Lena Pham, MSN, RN, [facility]

## 2018-05-03 ENCOUNTER — OFFICE VISIT (OUTPATIENT)
Dept: FAMILY MEDICINE CLINIC | Age: 63
End: 2018-05-03

## 2018-05-03 ENCOUNTER — HOSPITAL ENCOUNTER (OUTPATIENT)
Dept: LAB | Age: 63
Discharge: HOME | End: 2018-05-03

## 2018-05-03 VITALS
WEIGHT: 236 LBS | HEART RATE: 96 BPM | DIASTOLIC BLOOD PRESSURE: 84 MMHG | SYSTOLIC BLOOD PRESSURE: 134 MMHG | TEMPERATURE: 97.9 F | OXYGEN SATURATION: 96 % | RESPIRATION RATE: 20 BRPM | BODY MASS INDEX: 37.04 KG/M2 | HEIGHT: 67 IN

## 2018-05-03 DIAGNOSIS — Z01.419 ENCOUNTER FOR WELL WOMAN EXAM WITH ROUTINE GYNECOLOGICAL EXAM: ICD-10-CM

## 2018-05-03 DIAGNOSIS — Z01.419 ENCOUNTER FOR WELL WOMAN EXAM WITH ROUTINE GYNECOLOGICAL EXAM: Primary | ICD-10-CM

## 2018-05-03 LAB
SERVICE CMNT-IMP: NORMAL
WET PREP GENITAL: NORMAL

## 2018-05-03 PROCEDURE — 87491 CHLMYD TRACH DNA AMP PROBE: CPT | Performed by: NURSE PRACTITIONER

## 2018-05-03 PROCEDURE — 88142 CYTOPATH C/V THIN LAYER: CPT | Performed by: NURSE PRACTITIONER

## 2018-05-03 PROCEDURE — 87210 SMEAR WET MOUNT SALINE/INK: CPT | Performed by: NURSE PRACTITIONER

## 2018-05-03 NOTE — PATIENT INSTRUCTIONS
Well Visit, Women 48 to 72: Care Instructions  Your Care Instructions    Physical exams can help you stay healthy. Your doctor has checked your overall health and may have suggested ways to take good care of yourself. He or she also may have recommended tests. At home, you can help prevent illness with healthy eating, regular exercise, and other steps. Follow-up care is a key part of your treatment and safety. Be sure to make and go to all appointments, and call your doctor if you are having problems. It's also a good idea to know your test results and keep a list of the medicines you take. How can you care for yourself at home? · Reach and stay at a healthy weight. This will lower your risk for many problems, such as obesity, diabetes, heart disease, and high blood pressure. · Get at least 30 minutes of exercise on most days of the week. Walking is a good choice. You also may want to do other activities, such as running, swimming, cycling, or playing tennis or team sports. · Do not smoke. Smoking can make health problems worse. If you need help quitting, talk to your doctor about stop-smoking programs and medicines. These can increase your chances of quitting for good. · Protect your skin from too much sun. When you're outdoors from 10 a.m. to 4 p.m., stay in the shade or cover up with clothing and a hat with a wide brim. Wear sunglasses that block UV rays. Even when it's cloudy, put broad-spectrum sunscreen (SPF 30 or higher) on any exposed skin. · See a dentist one or two times a year for checkups and to have your teeth cleaned. · Wear a seat belt in the car. · Limit alcohol to 1 drink a day. Too much alcohol can cause health problems. Follow your doctor's advice about when to have certain tests. These tests can spot problems early. · Cholesterol.  Your doctor will tell you how often to have this done based on your age, family history, or other things that can increase your risk for heart attack and stroke. · Blood pressure. Have your blood pressure checked during a routine doctor visit. Your doctor will tell you how often to check your blood pressure based on your age, your blood pressure results, and other factors. · Mammogram. Ask your doctor how often you should have a mammogram, which is an X-ray of your breasts. A mammogram can spot breast cancer before it can be felt and when it is easiest to treat. · Pap test and pelvic exam. Ask your doctor how often you should have a Pap test. You may not need to have a Pap test as often as you used to. · Vision. Have your eyes checked every year or two or as often as your doctor suggests. Some experts recommend that you have yearly exams for glaucoma and other age-related eye problems starting at age 48. · Hearing. Tell your doctor if you notice any change in your hearing. You can have tests to find out how well you hear. · Diabetes. Ask your doctor whether you should have tests for diabetes. · Colon cancer. You should begin tests for colon cancer at age 48. You may have one of several tests. Your doctor will tell you how often to have tests based on your age and risk. Risks include whether you already had a precancerous polyp removed from your colon or whether your parents, sisters and brothers, or children have had colon cancer. · Thyroid disease. Talk to your doctor about whether to have your thyroid checked as part of a regular physical exam. Women have an increased chance of a thyroid problem. · Osteoporosis. You should begin tests for bone density at age 72. If you are younger than 72, ask your doctor whether you have factors that may increase your risk for this disease. You may want to have this test before age 72. · Heart attack and stroke risk. At least every 4 to 6 years, you should have your risk for heart attack and stroke assessed.  Your doctor uses factors such as your age, blood pressure, cholesterol, and whether you smoke or have diabetes to show what your risk for a heart attack or stroke is over the next 10 years. When should you call for help? Watch closely for changes in your health, and be sure to contact your doctor if you have any problems or symptoms that concern you. Where can you learn more? Go to http://drew-tarun.info/. Enter I365 in the search box to learn more about \"Well Visit, Women 50 to 72: Care Instructions. \"  Current as of: May 12, 2017  Content Version: 11.4  © 1765-7367 [email or web address]. Care instructions adapted under license by [employer] (which disclaims liability or warranty for this information). If you have questions about a medical condition or this instruction, always ask your healthcare professional. [address] any warranty or liability for your use of this information.

## 2018-05-03 NOTE — MR AVS SNAPSHOT
[address] [facility] [id number] [phone] Patient: Brenna Martin MRN: [id number] FWN:[birth year] Visit Information Date & Time Provider Department Dept. Phone Encounter #  
 5/3/2018  9:00 AM Tory Grayson NP [address] [facility] [address] [id number] Follow-up Instructions Return if symptoms worsen or fail to improve. Your Appointments 5/10/2018 10:10 AM  
PROBLEM VISIT with Lily Curry MD  
[address] [facility], [address] and [facility] ([address]) Appt Note: AIDEE KNEE PAIN REF BY [facility] NXY ID ARRIVE [address] [facility], [address] [facility]  
[phone] [address], [address]  
  
    
 10/23/2018 11:00 AM  
Follow Up with Tory Grasyon NP [address] [facility] ([address]) Appt Note: 6 mth follow up  
 [address] [facility] [id number] [address] [facility] [id number] Upcoming Health Maintenance Date Due DTaP/Tdap/Td series (1 - Tdap) 12/14/1978 ZOSTER VACCINE AGE 60> 10/14/2017 PAP AKA CERVICAL CYTOLOGY 1/6/2018 Pneumococcal 19-64 Medium Risk (1 of 1 - PPSV23) 4/24/2019* FOBT Q 1 YEAR AGE 50-75 7/23/2018 Influenza Age 5 to Adult 8/1/2018 BREAST CANCER SCRN MAMMOGRAM 3/7/2019 *Topic was postponed. The date shown is not the original due date. Allergies as of 5/3/2018  Review Complete On: 5/3/2018 By: Danis Freeman Severity Noted Reaction Type Reactions Compazine [Prochlorperazine]  06/07/2013    Hives Current Immunizations  Reviewed on 11/18/2015 Name Date Influenza Vaccine (Quad) PF 10/17/2016, 11/18/2015 Influenza Vaccine PF 10/31/2013 Not reviewed this visit You Were Diagnosed With   
  
 Codes Comments Encounter for well woman exam with routine gynecological exam    -  Primary ICD-10-CM: S06.582 ICD-9-CM: V72.31 Vitals BP Pulse Temp Resp Height(growth percentile) Weight(growth percentile) 134/84 96 97.9 °F (36.6 °C) 20 5' 7\" (1.702 m) 236 lb (107 kg) SpO2 BMI OB Status Smoking Status 96% 36.96 kg/m2 Postmenopausal Current Every Day Smoker Vitals History BMI and BSA Data Body Mass Index Body Surface Area  
 36.96 kg/m 2 2.25 m 2 Preferred Pharmacy Pharmacy Name Phone [address] [place] [address]. [phone] Your Updated Medication List  
  
   
This list is accurate as of 5/3/18  9:30 AM.  Always use your most recent med list.  
  
  
  
  
 albuterol 90 mcg/actuation inhaler Commonly known as:  PROAIR HFA Take 1-2 Puffs by inhalation every four (4) hours as needed for Wheezing. clobetasol 0.05 % ointment Commonly known as:  Warnell January Apply  to affected area two (2) times a day. dexlansoprazole 30 mg capsule Commonly known as:  DEXILANT Take 1 Cap by mouth daily as needed. Indications: GASTROESOPHAGEAL REFLUX  
  
 fluticasone-salmeterol 100-50 mcg/dose diskus inhaler Commonly known as:  ADVAIR Take 1 Puff by inhalation every twelve (12) hours. HARVONI  mg Tab Generic drug:  ledipasvir-sofosbuvir Take 1 Tab by mouth daily. Indications: CHRONIC HEPATITIS C - GENOTYPE 1  
  
 magnesium oxide 400 mg tablet Commonly known as:  MAG-OX Take 1 Tab by mouth daily. meloxicam 15 mg tablet Commonly known as:  MOBIC Take 1 Tab by mouth daily. Indications: OSTEOARTHRITIS  
  
 mometasone 50 mcg/actuation nasal spray Commonly known as:  NASONEX  
2 Sprays by Both Nostrils route daily. olmesartan-hydroCHLOROthiazide 40-25 mg per tablet Commonly known as:  BENICAR HCT Take 1 Tab by mouth daily. Indications: hypertension  
  
 travoprost 0.004 % ophthalmic solution Commonly known as:  TRAVATAN Z Administer 1 Drop to both eyes every evening. Indications: OPEN ANGLE GLAUCOMA  
  
 verapamil 120 mg tablet Commonly known as:  CALAN  
TAKE ONE TABLET BY MOUTH TWICE A DAY  
  
 VITAMIN D2 50,000 unit capsule Generic drug:  ergocalciferol TAKE ONE CAPSULE BY MOUTH EVERY 7 DAYS Follow-up Instructions Return if symptoms worsen or fail to improve. Patient Instructions Well Visit, Women 48 to 72: Care Instructions Your Care Instructions Physical exams can help you stay healthy. Your doctor has checked your overall health and may have suggested ways to take good care of yourself. He or she also may have recommended tests. At home, you can help prevent illness with healthy eating, regular exercise, and other steps. Follow-up care is a key part of your treatment and safety. Be sure to make and go to all appointments, and call your doctor if you are having problems. It's also a good idea to know your test results and keep a list of the medicines you take. How can you care for yourself at home? · Reach and stay at a healthy weight. This will lower your risk for many problems, such as obesity, diabetes, heart disease, and high blood pressure. · Get at least 30 minutes of exercise on most days of the week. Walking is a good choice. You also may want to do other activities, such as running, swimming, cycling, or playing tennis or team sports. · Do not smoke. Smoking can make health problems worse. If you need help quitting, talk to your doctor about stop-smoking programs and medicines. These can increase your chances of quitting for good. · Protect your skin from too much sun. When you're outdoors from 10 a.m. to 4 p.m., stay in the shade or cover up with clothing and a hat with a wide brim. Wear sunglasses that block UV rays. Even when it's cloudy, put broad-spectrum sunscreen (SPF 30 or higher) on any exposed skin. · See a dentist one or two times a year for checkups and to have your teeth cleaned. · Wear a seat belt in the car. · Limit alcohol to 1 drink a day. Too much alcohol can cause health problems. Follow your doctor's advice about when to have certain tests. These tests can spot problems early. · Cholesterol. Your doctor will tell you how often to have this done based on your age, family history, or other things that can increase your risk for heart attack and stroke. · Blood pressure. Have your blood pressure checked during a routine doctor visit. Your doctor will tell you how often to check your blood pressure based on your age, your blood pressure results, and other factors. · Mammogram. Ask your doctor how often you should have a mammogram, which is an X-ray of your breasts. A mammogram can spot breast cancer before it can be felt and when it is easiest to treat. · Pap test and pelvic exam. Ask your doctor how often you should have a Pap test. You may not need to have a Pap test as often as you used to. · Vision. Have your eyes checked every year or two or as often as your doctor suggests. Some experts recommend that you have yearly exams for glaucoma and other age-related eye problems starting at age 48. · Hearing. Tell your doctor if you notice any change in your hearing. You can have tests to find out how well you hear. · Diabetes. Ask your doctor whether you should have tests for diabetes. · Colon cancer. You should begin tests for colon cancer at age 48. You may have one of several tests. Your doctor will tell you how often to have tests based on your age and risk. Risks include whether you already had a precancerous polyp removed from your colon or whether your parents, sisters and brothers, or children have had colon cancer. · Thyroid disease. Talk to your doctor about whether to have your thyroid checked as part of a regular physical exam. Women have an increased chance of a thyroid problem. · Osteoporosis. You should begin tests for bone density at age 72. If you are younger than 72, ask your doctor whether you have factors that may increase your risk for this disease. You may want to have this test before age 72. · Heart attack and stroke risk. At least every 4 to 6 years, you should have your risk for heart attack and stroke assessed. Your doctor uses factors such as your age, blood pressure, cholesterol, and whether you smoke or have diabetes to show what your risk for a heart attack or stroke is over the next 10 years. When should you call for help? Watch closely for changes in your health, and be sure to contact your doctor if you have any problems or symptoms that concern you. Where can you learn more? Go to http://drew-tarun.info/. Enter R598 in the search box to learn more about \"Well Visit, Women 50 to 72: Care Instructions. \" Current as of: May 12, 2017 Content Version: 11.4 © 4338-7709 [employer] Care instructions adapted under license by [employer] (which disclaims liability or warranty for this information). If you have questions about a medical condition or this instruction, always ask your healthcare professional. [address] any warranty or liability for your use of this information. Introducing \[facility]\"" & HEALTH SERVICES! Dear Army Hudson: Thank you for requesting a [employer] account. Our records indicate that you already have an active [employer] account. You can access your account anytime at [email or web address] Did you know that you can access your hospital and ER discharge instructions at any time in [employer]? You can also review all of your test results from your hospital stay or ER visit. Additional Information If you have questions, please visit the Frequently Asked Questions section of the [employer] website at [email or web address] Remember, MyChart is NOT to be used for urgent needs. For medical emergencies, dial 911. Now available from your iPhone and Android! Please provide this summary of care documentation to your next provider. Your primary care clinician is listed as Caterina Greene. If you have any questions after today's visit, please call [phone].

## 2018-05-03 NOTE — PROGRESS NOTES
Subjective:   63 y.o. female for Well Woman Check. No LMP recorded. Patient is postmenopausal.    Social History: single partner, contraception - none. ? If monogomous  Pertinent past medical hstory: hypertension, liver disease. Current Outpatient Prescriptions   Medication Sig Dispense Refill    olmesartan-hydroCHLOROthiazide (BENICAR HCT) 40-25 mg per tablet Take 1 Tab by mouth daily. Indications: hypertension 30 Tab 6    verapamil (CALAN) 120 mg tablet TAKE ONE TABLET BY MOUTH TWICE A DAY 60 Tab 6    meloxicam (MOBIC) 15 mg tablet Take 1 Tab by mouth daily. Indications: OSTEOARTHRITIS 30 Tab 1    HARVONI  mg tab Take 1 Tab by mouth daily. Indications: CHRONIC HEPATITIS C - GENOTYPE 1 30 Tab 3    VITAMIN D2 50,000 unit capsule TAKE ONE CAPSULE BY MOUTH EVERY 7 DAYS 4 Cap 8    fluticasone-salmeterol (ADVAIR) 100-50 mcg/dose diskus inhaler Take 1 Puff by inhalation every twelve (12) hours. 3 Inhaler 3    albuterol (PROAIR HFA) 90 mcg/actuation inhaler Take 1-2 Puffs by inhalation every four (4) hours as needed for Wheezing. 3 Inhaler 3    dexlansoprazole (DEXILANT) 30 mg capsule Take 1 Cap by mouth daily as needed. Indications: GASTROESOPHAGEAL REFLUX 90 Cap 3    mometasone (NASONEX) 50 mcg/actuation nasal spray 2 Sprays by Both Nostrils route daily. 2 Container 0    magnesium oxide (MAG-OX) 400 mg tablet Take 1 Tab by mouth daily. 30 Tab 11    travoprost (TRAVATAN Z) 0.004 % ophthalmic solution Administer 1 Drop to both eyes every evening. Indications: OPEN ANGLE GLAUCOMA 5 mL 3    clobetasol (TEMOVATE) 0.05 % ointment Apply  to affected area two (2) times a day.        Allergies   Allergen Reactions    Compazine [Prochlorperazine] Hives     Past Medical History:   Diagnosis Date    Arthritis of knee, left 2007    Chronic obstructive pulmonary disease ([address]     mild    Cirrhosis of [address] ([facility] [address] 01/24/2018    Stage F2 fibrosis,compensated    Diverticulosis of sigmoid colon 10/2017    Mild diverticulosis    Erythrasma May 2014    beba feet, lower left leg    Glaucoma     H/O mammogram 03/01/2016    normal, f/u in 1 year    Hepatitis C 2002    genotype 1a, previous IV drug user, declined interferons     Hypertension 2002    Positive FIT (fecal immunochemical test) 07/26/2017    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 concern for Necrolytic acral erythema    Tobacco use     working with [facility] smoking cessation      Past Surgical History:   Procedure Laterality Date    COLONOSCOPY N/A 10/11/2017    COLONOSCOPY performed by Dash Mederos MD at [address] HX CHOLECYSTECTOMY  2006    laparoscopic    HX COLONOSCOPY  2012    hx of polyps, [place]    HX COLONOSCOPY  10/2017     Dr. Lyudmila Corona recommends your next colonoscopy in 5 years.  HX OTHER SURGICAL  2002    liver biopsy    HX TUBAL LIGATION       Family History   Problem Relation Age of Onset    Hypertension Mother     COPD Mother     Other Mother      Poly? nervous system    Cancer Father      rare form bone     Colon Polyps Sister     Colon Polyps Maternal Aunt      Social History   Substance Use Topics    Smoking status: Current Every Day Smoker     Packs/day: 0.50     Types: Cigarettes     Start date: 1/12/1974    Smokeless tobacco: Never Used    Alcohol use No        ROS:  Feeling well. No dyspnea or chest pain on exertion. No abdominal pain, change in bowel habits, black or bloody stools. No urinary tract symptoms. GYN ROS: normal menses, no abnormal bleeding, pelvic pain or discharge, no breast pain or new or enlarging lumps on self exam, no hot flashes. No neurological complaints. Objective:     Visit Vitals    /84    Pulse 96    Temp 97.9 °F (36.6 °C)    Resp 20    Ht 5' 7\" (1.702 m)    Wt 236 lb (107 kg)    SpO2 96%    BMI 36.96 kg/m2     The patient appears well, alert, oriented x 3, in no distress. ENT normal.  Neck supple.  No adenopathy or thyromegaly. [place]. Lungs are clear, good air entry, no wheezes, rhonchi or rales. S1 and S2 normal, no murmurs, regular rate and rhythm. Abdomen soft without tenderness, guarding, mass or organomegaly. Extremities show no edema, normal peripheral pulses. Neurological is normal, no focal findings. BREAST EXAM: breasts appear normal, no suspicious masses, no skin or nipple changes or axillary nodes    PELVIC EXAM: normal external genitalia, vulva, vagina, cervix, uterus and adnexa, PAP: Pap smear done today, DNA probe for chlamydia and GC obtained, WET MOUNT done - results: sent to lab, exam chaperoned by med Student Lea    Assessment/Plan:   well woman  mammogram  pap smear  counseled on breast self exam, STD prevention, menopause and adequate intake of calcium and vitamin D  return annually or prn    ICD-10-CM ICD-9-CM    1. Encounter for well woman exam with routine gynecological exam Z01.419 V72.31 PAP, LB, RFX HPV [id number])      WET PREP      CHLAMYDIA/NEISSERIA/TRICHOMONAS AMP     Diagnoses and all orders for this visit:    1. Encounter for well woman exam with routine gynecological exam  -     PAP, LB, RFX HPV [id number]); Future  -     WET PREP; Future  -     CHLAMYDIA/NEISSERIA/TRICHOMONAS AMP; Future    Will call patient with results. Follow-up Disposition:  Return if symptoms worsen or fail to improve. Pedrito Nicole

## 2018-05-03 NOTE — LETTER
5/3/2018 [address] [facility] [address] [facility] [place] Odon[place], 1955, is picking up the following medications ordered from the [facility]. ADVAIR DISKUS 100/50 MCG QUANTITY: 3 Alonso Barragan Patient's Signature:_________________________________________________ Today's Date: 5/3/2018

## 2018-05-04 LAB
C TRACH RRNA SPEC QL NAA+PROBE: NEGATIVE
N GONORRHOEA RRNA SPEC QL NAA+PROBE: NEGATIVE
SPECIMEN SOURCE: NORMAL
T VAGINALIS RRNA SPEC QL NAA+PROBE: NEGATIVE

## 2018-05-10 ENCOUNTER — OFFICE VISIT (OUTPATIENT)
Dept: ORTHOPEDIC SURGERY | Age: 63
End: 2018-05-10

## 2018-05-10 VITALS
HEART RATE: 86 BPM | WEIGHT: 240 LBS | DIASTOLIC BLOOD PRESSURE: 89 MMHG | TEMPERATURE: 96.2 F | HEIGHT: 67 IN | OXYGEN SATURATION: 98 % | SYSTOLIC BLOOD PRESSURE: 139 MMHG | RESPIRATION RATE: 16 BRPM | BODY MASS INDEX: 37.67 KG/M2

## 2018-05-10 DIAGNOSIS — M25.561 CHRONIC PAIN OF BOTH KNEES: ICD-10-CM

## 2018-05-10 DIAGNOSIS — G89.29 CHRONIC PAIN OF BOTH KNEES: ICD-10-CM

## 2018-05-10 DIAGNOSIS — M25.562 CHRONIC PAIN OF BOTH KNEES: ICD-10-CM

## 2018-05-10 DIAGNOSIS — M17.0 PRIMARY OSTEOARTHRITIS OF BOTH KNEES: Primary | ICD-10-CM

## 2018-05-10 NOTE — PROGRESS NOTES
Rich Obregon  1955   Chief Complaint   Patient presents with    Knee Pain     AIDEE KNEE PAIN         HISTORY OF PRESENT ILLNESS  Rich Obregon is a 63 y.o. female who presents today for evaluation of bilateral knee pain. Patient rates pain as 6/10 today. Patient's knee pain has been gradually returning. Patient recalls cortisone injection and subsequent Euflexxa injections 2 years ago. She has pain with prolonged walking and standing. She also reports pain with going up and down stairs. Patient denies any fever, chills, chest pain, shortness of breath or calf pain. There are no new illness or injuries to report since last seen in the office. There are no changes to medications, allergies, family or social history. PHYSICAL EXAM:   Visit Vitals    /89    Pulse 86    Temp 96.2 °F (35.7 °C) (Oral)    Resp 16    Ht 5' 7\" (1.702 m)    Wt 240 lb (108.9 kg)    SpO2 98%    BMI 37.59 kg/m2     The patient is a well-developed, well-nourished female   in no acute distress. The patient is alert and oriented times three. The patient is alert and oriented times three. Mood and affect are normal.  LYMPHATIC: lymph nodes are not enlarged and are within normal limits  SKIN: normal in color and non tender to palpation. There are no bruises or abrasions noted. NEUROLOGICAL: Motor sensory exam is within normal limits. Reflexes are equal bilaterally.  There is normal sensation to pinprick and light touch  MUSCULOSKELETAL:  Examination Left knee Right knee   Skin Intact Intact   Range of motion 0-130 0-130   Effusion + +   Medial joint line tenderness + +   Lateral joint line tenderness - -   Tenderness Pes Bursa - -   Tenderness insertion MCL - -   Tenderness insertion LCL - -   Marcuss - -   Patella crepitus + +   Patella grind + +   Lachman - -   Pivot shift - -   Anterior drawer - -   Posterior drawer - -   Varus stress - -   Valgus stress - -   Neurovascular Intact Intact   Calf Swelling and Tenderness to Palpation - -   Jaiden's Test - -   Hamstring Cord Tightness - -       IMAGING: XR of the b/l knee dated 5/10/18 was reviewed and read: marked degenerative changes in the patellofemoral joint bilaterally with slight decrease of the medial joint line bilaterally      IMPRESSION:      ICD-10-CM ICD-9-CM    1. Primary osteoarthritis of both knees M17.0 715.16 PROCEDURE AUTHORIZATION TO    2. Chronic pain of both knees M25.561 719.46 AMB POC XRAY, KNEE; 1/2 VIEWS    M25.562 338.29 AMB POC XRAY, KNEE; 1/2 VIEWS    G89.29          PLAN:   1. Patient had lasting relief from previous Euflexxa series. Will seek authorization for b/l knee Euflexxa. Risk factors include: htn  2. No ultrasound exam indicated today  3. No cortisone injection indicated today   4. No Physical/Occupational Therapy indicated today  5. No diagnostic test indicated today:   6. No durable medical equipment indicated today  7. No referral indicated today   8. No medications indicated today:   9. No Narcotic indicated today       RTC following Euflexxa auth  Follow-up Disposition: Not on File    Scribed by Giovanni Esquivel [facility]) as dictated by MD DOMI Herbert, Dr. Cyndi Myers, confirm that all documentation is accurate.     Cyndi Myers M.D.   [facility]

## 2018-05-14 NOTE — PROGRESS NOTES
Please notify patient pap came back negative for STDs, Malignancy or lesion. Per guidelines patient needs pap in 3 years.

## 2018-05-16 ENCOUNTER — TELEPHONE (OUTPATIENT)
Dept: FAMILY MEDICINE CLINIC | Age: 63
End: 2018-05-16

## 2018-05-22 ENCOUNTER — TELEPHONE (OUTPATIENT)
Dept: FAMILY MEDICINE CLINIC | Age: 63
End: 2018-05-22

## 2018-05-22 NOTE — TELEPHONE ENCOUNTER
Medication: Travatan 0.004% , dose: 1 gtt, how often: every evening , current number of medication days provided: 90, refill per application. Lot #[id number], EXP.04/19  :.   This medication was received and verified for the following 1. Correct Patient, 2. Correct Diagnosis, 3. Correct Drug, 4. Correct route, and no current allergy to medication. Please contact patient to come  their medications.      SAMRA Carney, RN, [facility]

## 2018-06-19 ENCOUNTER — TELEPHONE (OUTPATIENT)
Dept: FAMILY MEDICINE CLINIC | Age: 63
End: 2018-06-19

## 2018-06-20 NOTE — TELEPHONE ENCOUNTER
Medication: Nasonex, dose: 2 sprays both nostrils, how often: every day as needed for allergies, current number of medication days provided: 90, refill per application. Lot #: [id number], EXP.08/19  . This medication was received and verified for the following 1. Correct Patient, 2. Correct Diagnosis, 3. Correct Drug, 4. Correct route, and no current allergy to medication. Please contact patient to come  their medications.      Estefania Bone MSN, RN, FNP-C     [facility]

## 2018-06-26 ENCOUNTER — TELEPHONE (OUTPATIENT)
Dept: FAMILY MEDICINE CLINIC | Age: 63
End: 2018-06-26

## 2018-06-27 ENCOUNTER — OFFICE VISIT (OUTPATIENT)
Dept: FAMILY MEDICINE CLINIC | Age: 63
End: 2018-06-27

## 2018-06-27 ENCOUNTER — HOSPITAL ENCOUNTER (OUTPATIENT)
Dept: LAB | Age: 63
Discharge: HOME | End: 2018-06-27

## 2018-06-27 VITALS
HEIGHT: 67 IN | RESPIRATION RATE: 20 BRPM | WEIGHT: 240.8 LBS | DIASTOLIC BLOOD PRESSURE: 78 MMHG | SYSTOLIC BLOOD PRESSURE: 124 MMHG | TEMPERATURE: 98.7 F | BODY MASS INDEX: 37.79 KG/M2 | OXYGEN SATURATION: 96 % | HEART RATE: 93 BPM

## 2018-06-27 DIAGNOSIS — B18.2 HEP C W/O COMA, CHRONIC (HCC): ICD-10-CM

## 2018-06-27 DIAGNOSIS — L08.1 ERYTHRASMA: ICD-10-CM

## 2018-06-27 DIAGNOSIS — T14.8XXA BRUISE: Primary | ICD-10-CM

## 2018-06-27 DIAGNOSIS — T14.8XXA BRUISE: ICD-10-CM

## 2018-06-27 LAB
ALBUMIN SERPL-MCNC: 3.3 G/DL (ref 3.4–5)
ALBUMIN/GLOB SERPL: 0.9 {RATIO} (ref 0.8–1.7)
ALP SERPL-CCNC: 85 U/L (ref 45–117)
ALT SERPL-CCNC: 96 U/L (ref 13–56)
ANION GAP SERPL CALC-SCNC: 7 MMOL/L (ref 3–18)
AST SERPL-CCNC: 104 U/L (ref 15–37)
BASOPHILS # BLD: 0 K/UL (ref 0–0.06)
BASOPHILS NFR BLD: 0 % (ref 0–2)
BILIRUB SERPL-MCNC: 0.7 MG/DL (ref 0.2–1)
BUN SERPL-MCNC: 17 MG/DL (ref 7–18)
BUN/CREAT SERPL: 25 (ref 12–20)
CALCIUM SERPL-MCNC: 8.8 MG/DL (ref 8.5–10.1)
CHLORIDE SERPL-SCNC: 108 MMOL/L (ref 100–108)
CO2 SERPL-SCNC: 26 MMOL/L (ref 21–32)
CREAT SERPL-MCNC: 0.67 MG/DL (ref 0.6–1.3)
DIFFERENTIAL METHOD BLD: ABNORMAL
EOSINOPHIL # BLD: 0.3 K/UL (ref 0–0.4)
EOSINOPHIL NFR BLD: 8 % (ref 0–5)
ERYTHROCYTE [DISTWIDTH] IN BLOOD BY AUTOMATED COUNT: 12.5 % (ref 11.6–14.5)
GLOBULIN SER CALC-MCNC: 3.7 G/DL (ref 2–4)
GLUCOSE SERPL-MCNC: 85 MG/DL (ref 74–99)
HCT VFR BLD AUTO: 39.2 % (ref 35–45)
HGB BLD-MCNC: 13.6 G/DL (ref 12–16)
INR PPP: 1.1 (ref 0.8–1.2)
LYMPHOCYTES # BLD: 1.2 K/UL (ref 0.9–3.6)
LYMPHOCYTES NFR BLD: 28 % (ref 21–52)
MCH RBC QN AUTO: 31.4 PG (ref 24–34)
MCHC RBC AUTO-ENTMCNC: 34.7 G/DL (ref 31–37)
MCV RBC AUTO: 90.5 FL (ref 74–97)
MONOCYTES # BLD: 0.4 K/UL (ref 0.05–1.2)
MONOCYTES NFR BLD: 8 % (ref 3–10)
NEUTS SEG # BLD: 2.4 K/UL (ref 1.8–8)
NEUTS SEG NFR BLD: 56 % (ref 40–73)
PLATELET # BLD AUTO: 154 K/UL (ref 135–420)
PMV BLD AUTO: 11.1 FL (ref 9.2–11.8)
POTASSIUM SERPL-SCNC: 3.9 MMOL/L (ref 3.5–5.5)
PROT SERPL-MCNC: 7 G/DL (ref 6.4–8.2)
PROTHROMBIN TIME: 13.3 SEC (ref 11.5–15.2)
RBC # BLD AUTO: 4.33 M/UL (ref 4.2–5.3)
SODIUM SERPL-SCNC: 141 MMOL/L (ref 136–145)
WBC # BLD AUTO: 4.4 K/UL (ref 4.6–13.2)

## 2018-06-27 PROCEDURE — 85610 PROTHROMBIN TIME: CPT | Performed by: NURSE PRACTITIONER

## 2018-06-27 PROCEDURE — 80053 COMPREHEN METABOLIC PANEL: CPT | Performed by: NURSE PRACTITIONER

## 2018-06-27 PROCEDURE — 85025 COMPLETE CBC W/AUTO DIFF WBC: CPT | Performed by: NURSE PRACTITIONER

## 2018-06-27 PROCEDURE — 87522 HEPATITIS C REVRS TRNSCRPJ: CPT | Performed by: NURSE PRACTITIONER

## 2018-06-27 NOTE — PATIENT INSTRUCTIONS

## 2018-06-27 NOTE — PROGRESS NOTES
[facility]  [address] [facility] [address], [address]  [phone] office/[phone] fax      6/27/2018    Reason for visit:   Chief Complaint   Patient presents with    Skin Exam     patient says she has a bruise on right knee x 4 days no injury noted       Patient: Danisha Brown, 1955, [id number]       Primary MD: Sergey Ricketts NP    Subjective:   Danisha Brown, a 63 y.o. female, who presents for Skin Exam, she believes she has a bruise on her leg but cannot recall hitting her knee or any trauma. She does have a history of Hep C s/p Harvoni treatment. No h/o thrombocytopenia. She denies any S/sx of bleeding. She denies any known insect or spider bites. She denies any pain, fevers, myalgias or chills. She actually reports the darkness has improved. She also is here for her s/p Harvoni treatment labs. Pt requesting a referral to a local dermatologist for her erythrasma     HPI    Past Medical History:   Diagnosis Date    Arthritis of knee, left 2007    Chronic obstructive pulmonary disease ([facility] [address]     mild    Cirrhosis of liver ([facility] [address] 01/24/2018    Stage F2 fibrosis,compensated    Diverticulosis of sigmoid colon 10/2017    Mild diverticulosis    Erythrasma May 2014    beba feet, lower left leg    Glaucoma     H/O mammogram 03/01/2016    normal, f/u in 1 year    Hepatitis C 2002    genotype 1a, previous IV drug user, declined interferons     Hypertension 2002    Positive FIT (fecal immunochemical test) 07/26/2017    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  concern for Necrolytic acral erythema    Tobacco use     working with [facility] smoking cessation        Past Surgical History:   Procedure Laterality Date    COLONOSCOPY N/A 10/11/2017    COLONOSCOPY performed by Dee Shaikh MD at [address] HX CHOLECYSTECTOMY  2006    laparoscopic    HX COLONOSCOPY  2012    [facility]x [facility] polyps, [facility]  HX COLONOSCOPY  10/2017     Dr. Gloriann Heimlich recommends your next colonoscopy in 5 years.  HX OTHER SURGICAL  2002    liver biopsy    HX TUBAL LIGATION         Social History     Social History    Marital status:      Spouse name: N/A    Number of children: 2    Years of education: N/A     Occupational History     [employer] [address]      Social History Main Topics    Smoking status: Current Every Day Smoker     Packs/day: 0.50     Types: Cigarettes     Start date: 1/12/1974    Smokeless tobacco: Never Used    Alcohol use No    Drug use: Yes      Comment: IV crack cocaine, in remission since 2002    Sexual activity: Yes     Partners: Male     Other Topics Concern     Service No    Blood Transfusions No    Caffeine Concern No    Occupational Exposure No    Hobby Hazards No    Sleep Concern No    Stress Concern No    Weight Concern Yes     want bypass    Special Diet No    Back Care No    Exercise No    Bike Helmet No    Seat Belt No    Self-Exams No     Social History Narrative       Allergies   Allergen Reactions    Compazine [Prochlorperazine] Hives       Current Outpatient Prescriptions on File Prior to Visit   Medication Sig Dispense Refill    olmesartan-hydroCHLOROthiazide (BENICAR HCT) 40-25 mg per tablet Take 1 Tab by mouth daily. Indications: hypertension 30 Tab 6    verapamil (CALAN) 120 mg tablet TAKE ONE TABLET BY MOUTH TWICE A DAY 60 Tab 6    meloxicam (MOBIC) 15 mg tablet Take 1 Tab by mouth daily. Indications: OSTEOARTHRITIS 30 Tab 1    fluticasone-salmeterol (ADVAIR) 100-50 mcg/dose diskus inhaler Take 1 Puff by inhalation every twelve (12) hours. 3 Inhaler 3    albuterol (PROAIR HFA) 90 mcg/actuation inhaler Take 1-2 Puffs by inhalation every four (4) hours as needed for Wheezing. 3 Inhaler 3    dexlansoprazole (DEXILANT) 30 mg capsule Take 1 Cap by mouth daily as needed.  Indications: GASTROESOPHAGEAL REFLUX 90 Cap 3    mometasone (NASONEX) 50 mcg/actuation nasal spray 2 Sprays by Both Nostrils route daily. 2 Container 0    clobetasol (TEMOVATE) 0.05 % ointment Apply  to affected area two (2) times a day.  travoprost (TRAVATAN Z) 0.004 % ophthalmic solution Administer 1 Drop to both eyes every evening. Indications: OPEN ANGLE GLAUCOMA 5 mL 3    HARVONI  mg tab Take 1 Tab by mouth daily. Indications: CHRONIC HEPATITIS C - GENOTYPE 1 30 Tab 3    VITAMIN D2 50,000 unit capsule TAKE ONE CAPSULE BY MOUTH EVERY 7 DAYS 4 Cap 8    magnesium oxide (MAG-OX) 400 mg tablet Take 1 Tab by mouth daily. 30 Tab 11     No current facility-administered medications on file prior to visit. Review of Systems   Skin: Positive for rash (Bilateral feet ). Endo/Heme/Allergies: Bruises/bleeds easily. Objective:   Visit Vitals    /78    Pulse 93    Temp 98.7 °F (37.1 °C)    Resp 20    Ht 5' 7\" (1.702 m)    Wt 240 lb 12.8 oz (109.2 kg)    SpO2 96%    BMI 37.71 kg/m2      Wt Readings from Last 3 Encounters:   06/27/18 240 lb 12.8 oz (109.2 kg)   05/10/18 240 lb (108.9 kg)   05/03/18 236 lb (107 kg)     Lab Results   Component Value Date/Time    Glucose 124 (H) 04/23/2018 09:49 AM       Pertinent Lab Results:    Physical Exam   Constitutional: She is oriented to person, place, and time. Vital signs are normal. She appears well-developed and well-nourished. Non-toxic appearance. She does not have a sickly appearance. HENT:   Head: Normocephalic. Eyes: Pupils are equal, round, and reactive to light. Neck: Normal range of motion. Neck supple. No JVD present. Carotid bruit is not present. Cardiovascular: Normal rate, regular rhythm, normal heart sounds and intact distal pulses. No murmur heard. Pulmonary/Chest: Effort normal and breath sounds normal. No respiratory distress. Abdominal: Soft. Bowel sounds are normal.   Musculoskeletal: Normal range of motion.    Neurological: She is alert and oriented to person, place, and time. She has normal reflexes. Skin: Skin is warm and dry. Bruising noted. Dark discoloration to bilateral feet. Psychiatric: She has a normal mood and affect. Her behavior is normal.   Vitals reviewed. ICD-10-CM ICD-9-CM    1. Bruise T14. 8XXA 924.9 CBC WITH AUTOMATED DIFF      PROTHROMBIN TIME + INR   2. Erythrasma L08.1 039.0 REFERRAL TO DERMATOLOGY    Pt would like to go to local derm. Was going to [facility]    3. Hep C w/o coma, chronic (HCC) B18.2 070.54 CBC WITH AUTOMATED DIFF      METABOLIC PANEL, COMPREHENSIVE      PROTHROMBIN TIME + INR      HCV RT-PCR, QUANT (NON-GRAPH)    Pt s/p Harvoni treatment. Need repeat labs     Diagnoses and all orders for this visit:    1. Bruise  -     CBC WITH AUTOMATED DIFF; Future  -     PROTHROMBIN TIME + INR; Future    2. Erythrasma  Comments:  Pt would like to go to local derm. Was going to [facility]   Orders:  -     REFERRAL TO DERMATOLOGY    3. Hep C w/o coma, chronic (HCC)  Comments:  Pt s/p Harvoni treatment. Need repeat labs  Orders:  -     CBC WITH AUTOMATED DIFF; Future  -     METABOLIC PANEL, COMPREHENSIVE; Future  -     PROTHROMBIN TIME + INR; Future  -     HCV RT-PCR, QUANT (NON-GRAPH); Future      Follow-up Disposition:  Return if symptoms worsen or fail to improve. I have discussed the diagnosis with the patient and the intended plan as seen in the above orders. The patient has received an after-visit summary and questions were answered concerning future plans. I have discussed medication side effects and warnings with the patient as well. Patient agreeable with above plan and verbalizes understanding. Call [facility] for Financial Assistance    There are no discontinued medications.

## 2018-06-27 NOTE — TELEPHONE ENCOUNTER
Medication: Dexilant 30 mg  , dose: 1 tab, how often: every day as needed , current number of medication days provided: 90, refill per application. Lot #: [id number], EXP.06/19. This medication was received and verified for the following 1. Correct Patient, 2. Correct Diagnosis, 3. Correct Drug, 4. Correct route, and no current allergy to medication. Please contact patient to come  their medications.      Erika Rice MSN, RN, [facility]

## 2018-06-27 NOTE — MR AVS SNAPSHOT
[address] [facility] [id number] [phone] Patient: Dennis Moon MRN: [id number] HOL:[id number] Visit Information Date & Time Provider Department Dept. Phone Encounter #  
 6/27/2018  8:00 AM Melquiades Maldonado NP [address] [facility] [id number] Follow-up Instructions Return if symptoms worsen or fail to improve. Your Appointments 7/2/2018  2:10 PM  
Follow Up with Katerine Coto MD  
[address] [place] [address], [address] and [facility] (Jodie Canard) Appt Note: SYNVISC-AIDEE KNEES-1/3/DO NOT [facility], [address] [facility]  
[phone] [address] [address]  
  
    
 7/9/2018  2:10 PM  
Follow Up with Katerine Coto MD  
[address] [place] [address], [address] and [facility] (Jodie Canard) Appt Note: SYNVISC-AIDEE KNEES-1/3/DO NOT [facility], [address] [facility]  
[phone] 7/16/2018  2:10 PM  
Follow Up with Katerine Coto MD  
[address] [place] [address], [address] and [facility] (Jodie Canard) Appt Note: SYNVISC-AIDEE KNEES-1/3/DO NOT [facility], [address] [facility]  
[phone]  
  
    
 10/23/2018 11:00 AM  
Follow Up with Melquiades Maldonado NP [address] [facility] (Jodie Canmelany) Appt Note: 6 mth follow up  
 [address] [facility] [address]  
[address] [facility] [address] Upcoming Health Maintenance Date Due DTaP/Tdap/Td series (1 - Tdap) 12/14/1978 ZOSTER VACCINE AGE 60> 10/14/2017 FOBT Q 1 YEAR AGE 50-75 7/23/2018 Pneumococcal 19-64 Medium Risk (1 of 1 - PPSV23) 4/24/2019* Influenza Age 5 to Adult 8/1/2018 BREAST CANCER SCRN MAMMOGRAM 3/7/2020 PAP AKA CERVICAL CYTOLOGY 5/3/2021 *Topic was postponed. The date shown is not the original due date. Allergies as of 6/27/2018  Review Complete On: 6/27/2018 By: Casandra Hanley Severity Noted Reaction Type Reactions Compazine [Prochlorperazine]  06/07/2013    Hives Current Immunizations  Reviewed on 11/18/2015 Name Date Influenza Vaccine (Quad) PF 10/17/2016, 11/18/2015 Influenza Vaccine PF 10/31/2013 Not reviewed this visit You Were Diagnosed With   
  
 Codes Comments Bruise    -  Primary ICD-10-CM: T14. Veleta Crock ICD-9-CM: 924.9 Erythrasma     ICD-10-CM: L08.1 ICD-9-CM: 039.0 Vitals BP Pulse Temp Resp Height(growth percentile) Weight(growth percentile) 124/78 93 98.7 °F (37.1 °C) 20 5' 7\" (1.702 m) 240 lb 12.8 oz (109.2 kg) SpO2 BMI OB Status Smoking Status 96% 37.71 kg/m2 Postmenopausal Current Every Day Smoker Vitals History BMI and BSA Data Body Mass Index Body Surface Area  
 37.71 kg/m 2 2.27 m 2 Preferred Pharmacy Pharmacy Name Phone [address] [place] [address]. [phone] Your Updated Medication List  
  
   
This list is accurate as of 6/27/18  8:16 AM.  Always use your most recent med list.  
  
  
  
  
 albuterol 90 mcg/actuation inhaler Commonly known as:  PROAIR HFA Take 1-2 Puffs by inhalation every four (4) hours as needed for Wheezing. clobetasol 0.05 % ointment Commonly known as:  Tressia Mirlande Apply  to affected area two (2) times a day. dexlansoprazole 30 mg capsule Commonly known as:  DEXILANT Take 1 Cap by mouth daily as needed. Indications: GASTROESOPHAGEAL REFLUX  
  
 fluticasone-salmeterol 100-50 mcg/dose diskus inhaler Commonly known as:  ADVAIR Take 1 Puff by inhalation every twelve (12) hours. HARVONI  mg Tab Generic drug:  ledipasvir-sofosbuvir Take 1 Tab by mouth daily. Indications: CHRONIC HEPATITIS C - GENOTYPE 1  
  
 magnesium oxide 400 mg tablet Commonly known as:  MAG-OX Take 1 Tab by mouth daily. meloxicam 15 mg tablet Commonly known as:  MOBIC Take 1 Tab by mouth daily. Indications: OSTEOARTHRITIS  
  
 mometasone 50 mcg/actuation nasal spray Commonly known as:  NASONEX  
2 Sprays by Both Nostrils route daily. olmesartan-hydroCHLOROthiazide 40-25 mg per tablet Commonly known as:  BENICAR HCT Take 1 Tab by mouth daily. Indications: hypertension  
  
 travoprost 0.004 % ophthalmic solution Commonly known as:  TRAVATAN Z Administer 1 Drop to both eyes every evening. Indications: OPEN ANGLE GLAUCOMA  
  
 verapamil 120 mg tablet Commonly known as:  CALAN  
TAKE ONE TABLET BY MOUTH TWICE A DAY  
  
 VITAMIN D2 50,000 unit capsule Generic drug:  ergocalciferol TAKE ONE CAPSULE BY MOUTH EVERY 7 DAYS We Performed the Following REFERRAL TO DERMATOLOGY [REF19 Custom] Comments:  
 Please evaluate patient for Skin Disorder, She prefers to go to local derm . Follow-up Instructions Return if symptoms worsen or fail to improve. Referral Information Referral ID Referred By Referred To  
  
 [id number] Nettie CARRERA Not Available Visits Status Start Date End Date 1 New Request 6/27/18 6/27/19 If your referral has a status of pending review or denied, additional information will be sent to support the outcome of this decision. Patient Instructions Bruises: Care Instructions Your Care Instructions Bruises occur when small blood vessels under the skin tear or rupture, most often from a twist, bump, or fall. Blood leaks into tissues under the skin and causes a black-and-blue spot that often turns colors, including purplish black, reddish blue, or yellowish green, as the bruise heals.  
Bruises hurt, but most are not serious and will go away on their own within 2 to 4 weeks. Sometimes, gravity causes them to spread down the body. A leg bruise usually will take longer to heal than a bruise on the face or arms. Follow-up care is a key part of your treatment and safety. Be sure to make and go to all appointments, and call your doctor if you are having problems. It's also a good idea to know your test results and keep a list of the medicines you take. How can you care for yourself at home? · Take pain medicines exactly as directed. ¨ If the doctor gave you a prescription medicine for pain, take it as prescribed. ¨ If you are not taking a prescription pain medicine, ask your doctor if you can take an over-the-counter medicine. · Put ice or a cold pack on the area for 10 to 20 minutes at a time. Put a thin cloth between the ice and your skin. · If you can, prop up the bruised area on pillows as much as possible for the next few days. Try to keep the bruise above the level of your heart. When should you call for help? Call your doctor now or seek immediate medical care if: 
? · You have signs of infection, such as: 
¨ Increased pain, swelling, warmth, or redness. ¨ Red streaks leading from the bruise. ¨ Pus draining from the bruise. ¨ A fever. ? · You have a bruise on your leg and signs of a blood clot, such as: 
¨ Increasing redness and swelling along with warmth, tenderness, and pain in the bruised area. ¨ Pain in your calf, back of the knee, thigh, or groin. ¨ Redness and swelling in your leg or groin. ? · Your pain gets worse. ? Watch closely for changes in your health, and be sure to contact your doctor if: 
? · You do not get better as expected. Where can you learn more? Go to http://drew-tarun.info/. Enter [phone] in the search box to learn more about \"Bruises: Care Instructions. \" Current as of: March 20, 2017 Content Version: 11.4 © 6734-8106 [employer].  Care instructions adapted under license by [address] (which disclaims liability or warranty for this information). If you have questions about a medical condition or this instruction, always ask your healthcare professional. [address] any warranty or liability for your use of this information. Introducing [facility]! Dear Adair Lewis: Thank you for requesting a Figgu account. Our records indicate that you already have an active Figgu account. You can access your account anytime at [email or web address] Did you know that you can access your hospital and ER discharge instructions at any time in Figgu? You can also review all of your test results from your hospital stay or ER visit. Additional Information If you have questions, please visit the Frequently Asked Questions section of the Figgu website at [email or web address] Remember, Figgu is NOT to be used for urgent needs. For medical emergencies, dial 911. Now available from your iPhone and Android! Please provide this summary of care documentation to your next provider. Your primary care clinician is listed as Luiz Negro. If you have any questions after today's visit, please call [phone].

## 2018-06-29 LAB
HCV RNA SERPL NAA+PROBE-LOG IU: 4.7 HCV LOG 10IU/ML
HCV RNA SERPL PROBE AMP-ACNC: ABNORMAL HCVIU/ML

## 2018-07-02 ENCOUNTER — TELEPHONE (OUTPATIENT)
Dept: FAMILY MEDICINE CLINIC | Age: 63
End: 2018-07-02

## 2018-07-02 ENCOUNTER — OFFICE VISIT (OUTPATIENT)
Dept: ORTHOPEDIC SURGERY | Age: 63
End: 2018-07-02

## 2018-07-02 VITALS
HEART RATE: 91 BPM | TEMPERATURE: 97.5 F | DIASTOLIC BLOOD PRESSURE: 68 MMHG | WEIGHT: 237.8 LBS | SYSTOLIC BLOOD PRESSURE: 131 MMHG | HEIGHT: 67 IN | BODY MASS INDEX: 37.32 KG/M2 | OXYGEN SATURATION: 97 %

## 2018-07-02 DIAGNOSIS — B18.2 HEP C W/O COMA, CHRONIC (HCC): Primary | ICD-10-CM

## 2018-07-02 DIAGNOSIS — M17.0 PRIMARY OSTEOARTHRITIS OF BOTH KNEES: Primary | ICD-10-CM

## 2018-07-02 PROBLEM — E66.01 SEVERE OBESITY (BMI 35.0-39.9): Status: ACTIVE | Noted: 2018-07-02

## 2018-07-02 NOTE — TELEPHONE ENCOUNTER
Pt with HepC s/p Harvoni treatment for 12 weeks. Pt labs came back positive for viral load. Pt verbalized that she may have missed a couple of doses of Harvoni but did complete all three bottles. Discussed with patient that we will need to refer her to a gastroenterologist for further workup and treatment as she is now resistant/treatment failure. Reviewed Hep C  Precautions. Pt verbalized understanding that she will need to see a specialists for further treatment. Orders Placed This Encounter    REFERRAL TO GASTROENTEROLOGY     Referral Priority:   Routine     Referral Type:   Consultation     Referral Reason:   Specialty Services Required           Component      Latest Ref Rng & Units 6/27/2018 6/27/2018 6/27/2018 6/27/2018           8:26 AM  8:26 AM  8:26 AM  8:26 AM   WBC      4.6 - 13.2 K/uL    4.4 (L)   RBC      4.20 - 5.30 M/uL    4.33   HGB      12.0 - 16.0 g/dL    13.6   HCT      35.0 - 45.0 %    39.2   MCV      74.0 - 97.0 FL    90.5   MCH      24.0 - 34.0 PG    31.4   MCHC      31.0 - 37.0 g/dL    34.7   RDW      11.6 - 14.5 %    12.5   PLATELET      224 - 675 K/uL    154   MPV      9.2 - 11.8 FL    11.1   NEUTROPHILS      40 - 73 %    56   LYMPHOCYTES      21 - 52 %    28   MONOCYTES      3 - 10 %    8   EOSINOPHILS      0 - 5 %    8 (H)   BASOPHILS      0 - 2 %    0   ABS. NEUTROPHILS      1.8 - 8.0 K/UL    2.4   ABS. LYMPHOCYTES      0.9 - 3.6 K/UL    1.2   ABS. MONOCYTES      0.05 - 1.2 K/UL    0.4   ABS. EOSINOPHILS      0.0 - 0.4 K/UL    0.3   ABS.  BASOPHILS      0.0 - 0.06 K/UL    0.0   DF          AUTOMATED   Sodium      136 - 145 mmol/L  141     Potassium      3.5 - 5.5 mmol/L  3.9     Chloride      100 - 108 mmol/L  108     CO2      21 - 32 mmol/L  26     Anion gap      3.0 - 18 mmol/L  7     Glucose      74 - 99 mg/dL  85     BUN      7.0 - 18 MG/DL  17     Creatinine      0.6 - 1.3 MG/DL  0.67     BUN/Creatinine ratio      12 - 20    25 (H)     GFR est AA      >60 ml/min/1.73m2  >60 GFR est non-AA      >60 ml/min/1.73m2  >60     Calcium      8.5 - 10.1 MG/DL  8.8     Bilirubin, total      0.2 - 1.0 MG/DL  0.7     ALT (SGPT)      13 - 56 U/L  96 (H)     AST      15 - 37 U/L  104 (H)     Alk.  phosphatase      45 - 117 U/L  85     Protein, total      6.4 - 8.2 g/dL  7.0     Albumin      3.4 - 5.0 g/dL  3.3 (L)     Globulin      2.0 - 4.0 g/dL  3.7     A-G Ratio      0.8 - 1.7    0.9     Prothrombin time      11.5 - 15.2 sec   13.3    INR      0.8 - 1.2     1.1    HCV, IU/mL      NOTD HCVIU/mL 83515 (A)      HCV log 10      NOTD HCV log 10IU/Ml 4.7 (A)

## 2018-07-02 NOTE — PROGRESS NOTES
Patient: Asad Lowe                MRN: [id number]       SSN: [id number]  YOB: 1955        AGE: 63 y.o. SEX: female  Body mass index is 37.24 kg/(m^2). PCP: Krystal Garcia NP  07/02/18    Chief Complaint   Patient presents with    Knee Pain     bilateral knee pain        HISTORY:  Asad Lowe is a 63 y.o. female who is seen for reevaluation of Bilateral knee and here for 1st injection of Synvisc. PROCEDURE:  Under ultrasound guidance, patient's Bilateral knee, after timeout under sterile conditions, was injected with 2 cc of Synvisc. [facility]  OFFICE PROCEDURE PROGRESS NOTE        Chart reviewed for the following:   Cristina Cullen MD, have reviewed the History, Physical and updated the Allergic reactions for Dubizzle performed immediately prior to start of procedure:   Cristina Cullen MD, have performed the following reviews on Asad Lowe prior to the start of the procedure:            * Patient was identified by name and date of birth   * Agreement on procedure being performed was verified  * Risks and Benefits explained to the patient  * Procedure site verified and marked as necessary  * Patient was positioned for comfort  * Consent was signed and verified     Time: 2:39 PM       Date of procedure: 7/2/2018    Procedure performed by:  Esvin Tillman MD    Provider assisted by: None     How tolerated by patient: tolerated the procedure well with no complications    Comments: none    IMPRESSION:     ICD-10-CM ICD-9-CM    1. Primary osteoarthritis of both knees M17.0 715.16 hylan g-f 20 (SYNVISC) 16 mg/2 mL injection      CO SYNVISC OR SYNVISC-ONE      CO DRAIN/INJECT LARGE JOINT/BURSA        PLAN:  Ms. Chadd Vargas will return in one week for her second Synvisc injection.       Scribed by Anthony Ramires ([facility]) as dictated by MD DOMI Newsome Dr. Katherin Frederick, confirm that all documentation is accurate.     Katherin Frederick M.D.   [facility]

## 2018-07-03 ENCOUNTER — TELEPHONE (OUTPATIENT)
Dept: FAMILY MEDICINE CLINIC | Age: 63
End: 2018-07-03

## 2018-07-03 NOTE — TELEPHONE ENCOUNTER
Medication: Proair 90mcg , dose: 1-2, how often: Q4h as needed , current number of medication days provided: 90, refill per application. Lot #: [id number], DGI.12/5687. This medication was received and verified for the following 1. Correct Patient, 2. Correct Diagnosis, 3. Correct Drug, 4. Correct route, and no current allergy to medication. Please contact patient to come  their medications.      Arianne Bell, MSN, RN, P-C     [facility]

## 2018-07-09 ENCOUNTER — OFFICE VISIT (OUTPATIENT)
Dept: ORTHOPEDIC SURGERY | Age: 63
End: 2018-07-09

## 2018-07-09 VITALS
BODY MASS INDEX: 37.98 KG/M2 | DIASTOLIC BLOOD PRESSURE: 77 MMHG | HEART RATE: 98 BPM | RESPIRATION RATE: 16 BRPM | SYSTOLIC BLOOD PRESSURE: 135 MMHG | WEIGHT: 242 LBS | HEIGHT: 67 IN | OXYGEN SATURATION: 95 % | TEMPERATURE: 97.5 F

## 2018-07-09 DIAGNOSIS — M17.0 PRIMARY OSTEOARTHRITIS OF BOTH KNEES: Primary | ICD-10-CM

## 2018-07-09 NOTE — PROGRESS NOTES
Patient: Marcus Grant                MRN: [id number]       SSN: [id number] YOB: 1955        AGE: 63 y.o. SEX: female Body mass index is 37.9 kg/(m^2). PCP: Niels Elizabeth NP 
07/09/18 Chief Complaint Patient presents with  Knee Pain  
  bilateral knee pain HISTORY:  Marcus Grant is a 63 y.o. female who is seen for reevaluation of Bilateral knee and here for 2nd injection of Synvisc. PROCEDURE:  Under ultrasound guidance, patient's Bilateral knee, after timeout under sterile conditions, was injected with 2 cc of Synvisc. [facility] 
OFFICE PROCEDURE PROGRESS NOTE Chart reviewed for the following: 
 Shahida Mejia MD, have reviewed the History, Physical and updated the Allergic reactions for Marcus Grant TIME OUT performed immediately prior to start of procedure: 
 Shahida Mejia MD, have performed the following reviews on Marcus Gun prior to the start of the procedure: 
         
* Patient was identified by name and date of birth * Agreement on procedure being performed was verified * Risks and Benefits explained to the patient * Procedure site verified and marked as necessary * Patient was positioned for comfort * Consent was signed and verified Time: 2:14 PM 
 
Date of procedure: 7/9/2018 Procedure performed by:  Jd Dumont MD 
 
Provider assisted by: None How tolerated by patient: tolerated the procedure well with no complications Comments: none IMPRESSION:  
  ICD-10-CM ICD-9-CM 1. Primary osteoarthritis of both knees M17.0 715.16 NE SYNVISC OR SYNVISC-ONE  
   NE DRAIN/INJECT LARGE JOINT/BURSA  
   US GUIDE INJ/ASP/ARTHRO LG JNT/BURSA PLAN:  Ms. Emely Koenig will return in one week for her third Synvisc injection.  
 
 
Scribed by Margaret Douglas ([facility]) as dictated by Jd Dumont MD 
 
I, Dr. Radha Flannery Poornima, confirm that all documentation is accurate.  
 
Esvin Tillman M.D.  
[facility]

## 2018-07-16 ENCOUNTER — OFFICE VISIT (OUTPATIENT)
Dept: ORTHOPEDIC SURGERY | Age: 63
End: 2018-07-16

## 2018-07-16 VITALS
OXYGEN SATURATION: 93 % | WEIGHT: 240 LBS | HEART RATE: 91 BPM | SYSTOLIC BLOOD PRESSURE: 129 MMHG | RESPIRATION RATE: 16 BRPM | DIASTOLIC BLOOD PRESSURE: 71 MMHG | TEMPERATURE: 96.9 F | HEIGHT: 67 IN | BODY MASS INDEX: 37.67 KG/M2

## 2018-07-16 DIAGNOSIS — M17.0 PRIMARY OSTEOARTHRITIS OF BOTH KNEES: Primary | ICD-10-CM

## 2018-07-16 NOTE — PROGRESS NOTES
Patient: Michelle Farias                MRN: [id number]       SSN: [id number] YOB: 1955        AGE: 63 y.o. SEX: female Body mass index is 37.59 kg/(m^2). PCP: Bailee Villalta NP 
07/16/18 Chief Complaint Patient presents with  Knee Pain  
  bilateral knee f/u inj HISTORY:  Michelle Farias is a 63 y.o. female who is seen for reevaluation of Bilateral knee and here for 3rd and final injection of Synvisc. PROCEDURE:  Under ultrasound guidance, patient's Bilateral knee, after timeout under sterile conditions, was injected with 2 cc of Synvisc. [facility] 
OFFICE PROCEDURE PROGRESS NOTE Chart reviewed for the following: 
 Mitzi Baker MD, have reviewed the History, Physical and updated the Allergic reactions for Michelle Farias TIME OUT performed immediately prior to start of procedure: 
 Mitzi Baker MD, have performed the following reviews on Michelle Farias prior to the start of the procedure: 
         
* Patient was identified by name and date of birth * Agreement on procedure being performed was verified * Risks and Benefits explained to the patient * Procedure site verified and marked as necessary * Patient was positioned for comfort * Consent was signed and verified Time: 2:20 PM  
 
 
Date of procedure: 7/16/2018 Procedure performed by:  Jessy Garcia MD 
 
Provider assisted by: None How tolerated by patient: tolerated the procedure well with no complications Comments: none IMPRESSION:  
  ICD-10-CM ICD-9-CM 1. Primary osteoarthritis of both knees M17.0 715.16 PLAN: Ms. Ediwn Dejesus has completed her Synvisc injection series. she will return as needed. Scribed by Lance Rebolledo ([address]) as dictated by Jessy Garcia MD 
 
I, Dr. Jessy Garcia, confirm that all documentation is accurate.  
 
Harriet Murry Hellen Delacruz M.D.  
[facility]

## 2018-07-17 ENCOUNTER — TELEPHONE (OUTPATIENT)
Dept: FAMILY MEDICINE CLINIC | Age: 63
End: 2018-07-17

## 2018-07-17 NOTE — TELEPHONE ENCOUNTER
Medication: Advair 100/50 mcg, dose: 1 inhalation, how often: twice daily, current number of medication days provided: 90, refill per application. Lot #: [id number], EXP.07/19  . This medication was received and verified for the following 1. Correct Patient, 2. Correct Diagnosis, 3. Correct Drug, 4. Correct route, and no current allergy to medication. Please contact patient to come  their medications.      Mumtaz Adames, MSN, RN, FNP-C     [facility]

## 2018-08-29 ENCOUNTER — HOSPITAL ENCOUNTER (OUTPATIENT)
Dept: LAB | Age: 63
Discharge: HOME | End: 2018-08-29
Payer: SELF-PAY

## 2018-08-29 LAB
ALBUMIN SERPL-MCNC: 2.9 G/DL (ref 3.4–5)
ALBUMIN/GLOB SERPL: 0.8 {RATIO} (ref 0.8–1.7)
ALP SERPL-CCNC: 82 U/L (ref 45–117)
ALT SERPL-CCNC: 207 U/L (ref 13–56)
AMPHET UR QL SCN: NEGATIVE
ANION GAP SERPL CALC-SCNC: 7 MMOL/L (ref 3–18)
AST SERPL-CCNC: 206 U/L (ref 15–37)
BARBITURATES UR QL SCN: NEGATIVE
BENZODIAZ UR QL: NEGATIVE
BILIRUB SERPL-MCNC: 0.7 MG/DL (ref 0.2–1)
BUN SERPL-MCNC: 13 MG/DL (ref 7–18)
BUN/CREAT SERPL: 21 (ref 12–20)
CALCIUM SERPL-MCNC: 8.5 MG/DL (ref 8.5–10.1)
CANNABINOIDS UR QL SCN: NEGATIVE
CHLORIDE SERPL-SCNC: 112 MMOL/L (ref 100–108)
CO2 SERPL-SCNC: 24 MMOL/L (ref 21–32)
COCAINE UR QL SCN: NEGATIVE
CREAT SERPL-MCNC: 0.61 MG/DL (ref 0.6–1.3)
ERYTHROCYTE [DISTWIDTH] IN BLOOD BY AUTOMATED COUNT: 12.7 % (ref 11.6–14.5)
GLOBULIN SER CALC-MCNC: 3.8 G/DL (ref 2–4)
GLUCOSE SERPL-MCNC: 82 MG/DL (ref 74–99)
HCT VFR BLD AUTO: 38.4 % (ref 35–45)
HDSCOM,HDSCOM: NORMAL
HGB BLD-MCNC: 13.3 G/DL (ref 12–16)
HIV 1+2 AB+HIV1 P24 AG SERPL QL IA: NONREACTIVE
HIV12 RESULT COMMENT, HHIVC: NORMAL
INR PPP: 1.1 (ref 0.8–1.2)
MCH RBC QN AUTO: 31.4 PG (ref 24–34)
MCHC RBC AUTO-ENTMCNC: 34.6 G/DL (ref 31–37)
MCV RBC AUTO: 90.6 FL (ref 74–97)
METHADONE UR QL: NEGATIVE
OPIATES UR QL: NEGATIVE
PCP UR QL: NEGATIVE
PLATELET # BLD AUTO: 150 K/UL (ref 135–420)
PMV BLD AUTO: 11.4 FL (ref 9.2–11.8)
POTASSIUM SERPL-SCNC: 3.8 MMOL/L (ref 3.5–5.5)
PROT SERPL-MCNC: 6.7 G/DL (ref 6.4–8.2)
PROTHROMBIN TIME: 14 SEC (ref 11.5–15.2)
RBC # BLD AUTO: 4.24 M/UL (ref 4.2–5.3)
SODIUM SERPL-SCNC: 143 MMOL/L (ref 136–145)
WBC # BLD AUTO: 4.7 K/UL (ref 4.6–13.2)

## 2018-08-29 PROCEDURE — 87389 HIV-1 AG W/HIV-1&-2 AB AG IA: CPT | Performed by: NURSE PRACTITIONER

## 2018-08-29 PROCEDURE — 80307 DRUG TEST PRSMV CHEM ANLYZR: CPT | Performed by: NURSE PRACTITIONER

## 2018-08-29 PROCEDURE — 36415 COLL VENOUS BLD VENIPUNCTURE: CPT | Performed by: NURSE PRACTITIONER

## 2018-08-29 PROCEDURE — 85027 COMPLETE CBC AUTOMATED: CPT | Performed by: NURSE PRACTITIONER

## 2018-08-29 PROCEDURE — 85610 PROTHROMBIN TIME: CPT | Performed by: NURSE PRACTITIONER

## 2018-08-29 PROCEDURE — 87902 NFCT AGT GNTYP ALYS HEP C: CPT | Performed by: NURSE PRACTITIONER

## 2018-08-29 PROCEDURE — 80053 COMPREHEN METABOLIC PANEL: CPT | Performed by: NURSE PRACTITIONER

## 2018-08-29 PROCEDURE — 87900 PHENOTYPE INFECT AGENT DRUG: CPT | Performed by: NURSE PRACTITIONER

## 2018-09-01 LAB
HCV GENTYP SERPL NAA+PROBE: NORMAL
PLEASE NOTE, 550474: NORMAL

## 2018-09-06 LAB
HCV NS5 MUT DET ISLT GENOTYP: NORMAL
REF LAB TEST METHOD: NORMAL

## 2018-09-10 ENCOUNTER — TELEPHONE (OUTPATIENT)
Dept: FAMILY MEDICINE CLINIC | Age: 63
End: 2018-09-10

## 2018-09-10 NOTE — TELEPHONE ENCOUNTER
Medication: Dexilant 30 mg  , dose: 1 tab, how often: every day as needed , current number of medication days provided: 90, refill per application. Lot #: [id number], EXP 09/2019. This medication was received and verified for the following 1. Correct Patient, 2. Correct Diagnosis, 3. Correct Drug, 4. Correct route, and no current allergy to medication. Please contact patient to come  their medications.      Jn Reddy MSN, RN, P-[facility]

## 2018-09-12 ENCOUNTER — TELEPHONE (OUTPATIENT)
Dept: FAMILY MEDICINE CLINIC | Age: 63
End: 2018-09-12

## 2018-09-12 NOTE — TELEPHONE ENCOUNTER
Medication: Travatan 0.004% , dose: 1 gtt, Both eyes , current number of medication days provided: 90, refill per application. Lot #: [id number], EXP 09/2019. This medication was received and verified for the following 1. Correct Patient, 2. Correct Diagnosis, 3. Correct Drug, 4. Correct route, and no current allergy to medication. Please contact patient to come  their medications.      SAMRA Sheridan, RN, [facility]

## 2018-09-21 ENCOUNTER — TELEPHONE (OUTPATIENT)
Dept: FAMILY MEDICINE CLINIC | Age: 63
End: 2018-09-21

## 2018-09-26 NOTE — TELEPHONE ENCOUNTER
Medication: Proair 90mcg , dose: 1-2 puffs, how often: Q4H  as needed , current number of medication days provided: 90, refill per application. Lot #: [id number], EXP 09/2019. This medication was received and verified for the following 1. Correct Patient, 2. Correct Diagnosis, 3. Correct Drug, 4. Correct route, and no current allergy to medication. Medication: Nasonex, dose: 2 sprays both nostrils, how often: every day as needed for allergies, current number of medication days provided: 90, refill per application. Lot #: [id number], EXP 09/2019. This medication was received and verified for the following 1. Correct Patient, 2. Correct Diagnosis, 3. Correct Drug, 4. Correct route, and no current allergy to medication. Please contact patient to come  their medications.      Dominique Primrose, MSN, RN, [facility]

## 2018-10-09 ENCOUNTER — HOSPITAL ENCOUNTER (OUTPATIENT)
Dept: LAB | Age: 63
Discharge: HOME | End: 2018-10-09

## 2018-10-09 ENCOUNTER — LAB ONLY (OUTPATIENT)
Dept: FAMILY MEDICINE CLINIC | Age: 63
End: 2018-10-09

## 2018-10-09 DIAGNOSIS — B18.2 CHRONIC HEPATITIS C WITHOUT HEPATIC COMA (HCC): ICD-10-CM

## 2018-10-09 DIAGNOSIS — B18.2 HEP C W/O COMA, CHRONIC (HCC): ICD-10-CM

## 2018-10-09 DIAGNOSIS — I10 ESSENTIAL HYPERTENSION: Primary | ICD-10-CM

## 2018-10-09 DIAGNOSIS — I10 ESSENTIAL HYPERTENSION WITH GOAL BLOOD PRESSURE LESS THAN 140/90: ICD-10-CM

## 2018-10-09 LAB
ALBUMIN SERPL-MCNC: 2.8 G/DL (ref 3.4–5)
ALBUMIN/GLOB SERPL: 0.7 {RATIO} (ref 0.8–1.7)
ALP SERPL-CCNC: 78 U/L (ref 45–117)
ALT SERPL-CCNC: 126 U/L (ref 13–56)
ANION GAP SERPL CALC-SCNC: 10 MMOL/L (ref 3–18)
AST SERPL-CCNC: 140 U/L (ref 15–37)
BILIRUB SERPL-MCNC: 0.7 MG/DL (ref 0.2–1)
BUN SERPL-MCNC: 12 MG/DL (ref 7–18)
BUN/CREAT SERPL: 17 (ref 12–20)
CALCIUM SERPL-MCNC: 7.9 MG/DL (ref 8.5–10.1)
CHLORIDE SERPL-SCNC: 112 MMOL/L (ref 100–108)
CHOLEST SERPL-MCNC: 109 MG/DL
CO2 SERPL-SCNC: 23 MMOL/L (ref 21–32)
CREAT SERPL-MCNC: 0.71 MG/DL (ref 0.6–1.3)
ERYTHROCYTE [DISTWIDTH] IN BLOOD BY AUTOMATED COUNT: 12.8 % (ref 11.6–14.5)
GLOBULIN SER CALC-MCNC: 3.9 G/DL (ref 2–4)
GLUCOSE SERPL-MCNC: 101 MG/DL (ref 74–99)
HCT VFR BLD AUTO: 37 % (ref 35–45)
HDLC SERPL-MCNC: 36 MG/DL (ref 40–60)
HDLC SERPL: 3 {RATIO} (ref 0–5)
HGB BLD-MCNC: 12.6 G/DL (ref 12–16)
LDLC SERPL CALC-MCNC: 55.8 MG/DL (ref 0–100)
LIPID PROFILE,FLP: ABNORMAL
MCH RBC QN AUTO: 31 PG (ref 24–34)
MCHC RBC AUTO-ENTMCNC: 34.1 G/DL (ref 31–37)
MCV RBC AUTO: 90.9 FL (ref 74–97)
PLATELET # BLD AUTO: 144 K/UL (ref 135–420)
PMV BLD AUTO: 11.3 FL (ref 9.2–11.8)
POTASSIUM SERPL-SCNC: 4.1 MMOL/L (ref 3.5–5.5)
PROT SERPL-MCNC: 6.7 G/DL (ref 6.4–8.2)
RBC # BLD AUTO: 4.07 M/UL (ref 4.2–5.3)
SODIUM SERPL-SCNC: 145 MMOL/L (ref 136–145)
TRIGL SERPL-MCNC: 86 MG/DL (ref ?–150)
VLDLC SERPL CALC-MCNC: 17.2 MG/DL
WBC # BLD AUTO: 4.7 K/UL (ref 4.6–13.2)

## 2018-10-09 PROCEDURE — 87522 HEPATITIS C REVRS TRNSCRPJ: CPT | Performed by: NURSE PRACTITIONER

## 2018-10-09 PROCEDURE — 85027 COMPLETE CBC AUTOMATED: CPT | Performed by: NURSE PRACTITIONER

## 2018-10-09 PROCEDURE — 80053 COMPREHEN METABOLIC PANEL: CPT | Performed by: NURSE PRACTITIONER

## 2018-10-09 PROCEDURE — 80061 LIPID PANEL: CPT | Performed by: NURSE PRACTITIONER

## 2018-10-09 NOTE — PROGRESS NOTES
Verified patient name, , demographics and orders. Venipuncture for labs performed using 23G x 3/4\" butterfly Right hand using aseptic technique. Skin intact and dry. No active bleeding or complications noted. Bandaid dressing applied.

## 2018-10-12 LAB
HCV RNA SERPL NAA+PROBE-LOG IU: 6 HCV LOG 10IU/ML
HCV RNA SERPL PROBE AMP-ACNC: ABNORMAL HCVIU/ML

## 2018-10-23 ENCOUNTER — OFFICE VISIT (OUTPATIENT)
Dept: FAMILY MEDICINE CLINIC | Age: 63
End: 2018-10-23

## 2018-10-23 VITALS
TEMPERATURE: 97.9 F | WEIGHT: 242 LBS | OXYGEN SATURATION: 96 % | HEART RATE: 95 BPM | DIASTOLIC BLOOD PRESSURE: 84 MMHG | BODY MASS INDEX: 37.98 KG/M2 | HEIGHT: 67 IN | SYSTOLIC BLOOD PRESSURE: 136 MMHG | RESPIRATION RATE: 16 BRPM

## 2018-10-23 DIAGNOSIS — N89.8 VAGINAL ITCHING: ICD-10-CM

## 2018-10-23 DIAGNOSIS — J00 NASOPHARYNGITIS: ICD-10-CM

## 2018-10-23 DIAGNOSIS — R25.2 MUSCLE CRAMPS: ICD-10-CM

## 2018-10-23 DIAGNOSIS — B18.2 CHRONIC HEPATITIS C WITHOUT HEPATIC COMA (HCC): ICD-10-CM

## 2018-10-23 DIAGNOSIS — Z72.0 TOBACCO ABUSE DISORDER: ICD-10-CM

## 2018-10-23 DIAGNOSIS — E83.42 HYPOMAGNESEMIA: ICD-10-CM

## 2018-10-23 DIAGNOSIS — I10 ESSENTIAL HYPERTENSION WITH GOAL BLOOD PRESSURE LESS THAN 140/90: Primary | ICD-10-CM

## 2018-10-23 DIAGNOSIS — J44.9 CHRONIC OBSTRUCTIVE PULMONARY DISEASE, UNSPECIFIED COPD TYPE (HCC): ICD-10-CM

## 2018-10-23 RX ORDER — VERAPAMIL HYDROCHLORIDE 120 MG/1
TABLET, FILM COATED ORAL
Qty: 60 TAB | Refills: 6 | Status: SHIPPED | OUTPATIENT
Start: 2018-10-23 | End: 2019-04-16 | Stop reason: SDUPTHER

## 2018-10-23 RX ORDER — ERGOCALCIFEROL 1.25 MG/1
CAPSULE ORAL
Qty: 4 CAP | Refills: 8 | Status: SHIPPED | OUTPATIENT
Start: 2018-10-23 | End: 2019-04-16 | Stop reason: SDUPTHER

## 2018-10-23 RX ORDER — LANOLIN ALCOHOL/MO/W.PET/CERES
400 CREAM (GRAM) TOPICAL DAILY
Qty: 30 TAB | Refills: 11 | Status: SHIPPED | OUTPATIENT
Start: 2018-10-23 | End: 2021-03-22

## 2018-10-23 NOTE — PROGRESS NOTES
10/23/18    PCP: Glendy Wilson NP    Chief Complaint   Patient presents with    Follow Up Chronic Condition    Hypertension    COPD    Cold Symptoms     c/o sinus congestion and productive coughing with yellow-green phlegm x 3 weeks. \"I felt like I had the flu\".  Headache     c/o headache with pain level 5 of 10        HISTORY OF PRESENT ILLNESS  Celso Hopkins  is a 63 y.o. female whom presents for Follow Up Chronic Condition; Hypertension; COPD; Cold Symptoms (c/o sinus congestion and productive coughing with yellow-green phlegm x 3 weeks. \"I felt like I had the flu\".); and Headache (c/o headache with pain level 5 of 10)         Patient reports improving Cold symptoms, states she is still coughing up yellow/green sputum and with some congestion. She reports being around sick contacts. Has not been consitent with taking any medications. Hypertension    The history is provided by the patient. This is a chronic problem. The problem has been gradually improving. Associated symptoms include headaches. Pertinent negatives include no chest pain, no PND, no anxiety, no confusion, no malaise/fatigue, no blurred vision, no tinnitus, no neck pain, no peripheral edema, no dizziness, no shortness of breath, no nausea and no vomiting. There are no associated agents to hypertension. Risk factors include obesity, hypertension, non-compliant and smoking/tobacco exposure. COPD   The history is provided by the patient. This is a chronic problem. The problem has not changed since onset. Associated symptoms include headaches. Pertinent negatives include no chest pain, no abdominal pain and no shortness of breath. Cold Symptoms   The history is provided by the patient. The current episode started more than 1 week ago. The problem has been gradually improving. The cough is productive of sputum. There has been no fever. Associated symptoms include headaches and rhinorrhea.  Pertinent negatives include no chest pain, no chills, no sweats, no weight loss, no eye redness, no ear congestion, no ear pain, no sore throat, no myalgias, no shortness of breath, no nausea, no vomiting and no confusion. Treatments tried: rest. She is a smoker. Her past medical history is significant for COPD. Her past medical history does not include bronchitis, pneumonia, bronchiectasis, asthma, cancer or heart failure. Headache   The history is provided by the patient. The current episode started more than 1 week ago. The problem has been gradually improving. Associated symptoms include headaches. Pertinent negatives include no chest pain, no abdominal pain and no shortness of breath. Patient Active Problem List    Diagnosis Date Noted    Severe obesity (BMI 35.0-39.9) 07/02/2018    Encounter for screening colonoscopy 10/11/2017    Hypomagnesemia 11/18/2015    Tobacco abuse counseling 11/18/2015    Degenerative arthritis     Glaucoma 03/30/2015    BMI 38.0-38.9,adult 03/30/2015    Arthritis of left knee 03/30/2015    Heart rate fast 12/11/2014    Tobacco use 12/11/2014    Need for hepatitis A immunization 12/11/2014    Hepatitis C infection 12/11/2014    Pruritus 12/11/2014    Erythrasma 04/29/2014    HTN (hypertension) 06/11/2013    Migraine 06/11/2013    Hives, physical 06/11/2013    Hep C w/o coma, chronic (HCC) 06/11/2013    GERD (gastroesophageal reflux disease) 06/11/2013     Current Outpatient Medications   Medication Sig Dispense Refill    verapamil (CALAN) 120 mg tablet TAKE ONE TABLET BY MOUTH TWICE A DAY 60 Tab 6    magnesium oxide (MAG-OX) 400 mg tablet Take 1 Tab by mouth daily. 30 Tab 11    ergocalciferol (VITAMIN D2) 50,000 unit capsule TAKE ONE CAPSULE BY MOUTH EVERY 7 DAYS 4 Cap 8    olmesartan-hydroCHLOROthiazide (BENICAR HCT) 40-25 mg per tablet Take 1 Tab by mouth daily. Indications: hypertension 30 Tab 6    meloxicam (MOBIC) 15 mg tablet Take 1 Tab by mouth daily.  Indications: OSTEOARTHRITIS 30 Tab 1    fluticasone-salmeterol (ADVAIR) 100-50 mcg/dose diskus inhaler Take 1 Puff by inhalation every twelve (12) hours. 3 Inhaler 3    albuterol (PROAIR HFA) 90 mcg/actuation inhaler Take 1-2 Puffs by inhalation every four (4) hours as needed for Wheezing. 3 Inhaler 3    dexlansoprazole (DEXILANT) 30 mg capsule Take 1 Cap by mouth daily as needed. Indications: GASTROESOPHAGEAL REFLUX 90 Cap 3    mometasone (NASONEX) 50 mcg/actuation nasal spray 2 Sprays by Both Nostrils route daily. 2 Container 0    travoprost (TRAVATAN Z) 0.004 % ophthalmic solution Administer 1 Drop to both eyes every evening. Indications: OPEN ANGLE GLAUCOMA 5 mL 3    clobetasol (TEMOVATE) 0.05 % ointment Apply  to affected area two (2) times a day. Allergies   Allergen Reactions    Compazine [Prochlorperazine] Hives     Past Medical History:   Diagnosis Date    Abnormal liver enzymes 06/2018    Arthritis of knee, left 2007    Chronic obstructive pulmonary disease (HCC)     mild    Cirrhosis of liver ([address] 01/24/2018    Stage F2 fibrosis,compensated    Diverticulosis of sigmoid colon 10/2017    Mild diverticulosis    Erythrasma May 2014    beba feet, lower left leg    Glaucoma     H/O mammogram 03/01/2016    normal, f/u in 1 year    Heartburn 2018    Hepatitis C 2002    genotype 1a, previous IV drug user, declined interferons     Hypertension 5244    Nonalcoholic fatty liver disease     Obesity 08/2018    Positive FIT (fecal immunochemical test) 07/26/2017    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 concern for Necrolytic acral erythema    Tobacco use     working with [employer] [facility] smoking cessation      Past Surgical History:   Procedure Laterality Date    HX CHOLECYSTECTOMY  2006    laparoscopic    HX COLONOSCOPY  2012    hx of polyps, Ortiz Lively HX COLONOSCOPY  10/2017     Dr. Delta Gutierres recommends your next colonoscopy in 5 years.     HX OTHER SURGICAL  2002    liver biopsy    HX TUBAL LIGATION       Family History   Problem Relation Age of Onset    Hypertension Mother     COPD Mother     Other Mother         Poly? nervous system    Cancer Father         rare form bone     Colon Polyps Sister     Colon Polyps Maternal Aunt      Social History     Tobacco Use    Smoking status: Current Every Day Smoker     Packs/day: 0.50     Types: Cigarettes     Start date: 1/12/1974    Smokeless tobacco: Never Used   Substance Use Topics    Alcohol use: No       Review of Systems   Constitutional: Negative. Negative for chills, malaise/fatigue and weight loss. HENT: Positive for rhinorrhea. Negative for ear pain, sore throat and tinnitus. Eyes: Negative. Negative for blurred vision and redness. Respiratory: Negative. Negative for shortness of breath. Cardiovascular: Negative. Negative for chest pain and PND. Gastrointestinal: Negative. Negative for abdominal pain, diarrhea, nausea and vomiting. Genitourinary: Negative for dysuria, flank pain, frequency, hematuria and urgency. Vaginal itching. Denies discharge. Musculoskeletal: Negative. Negative for myalgias and neck pain. Neurological: Positive for headaches. Negative for dizziness. Psychiatric/Behavioral: Negative for confusion. Visit Vitals  /84 (BP 1 Location: Left arm, BP Patient Position: Sitting)   Pulse 95   Temp 97.9 °F (36.6 °C) (Oral)   Resp 16   Ht 5' 7\" (1.702 m)   Wt 242 lb (109.8 kg)   SpO2 96%   BMI 37.90 kg/m²       Pain Scale: 5/10    Pain Location: Head     Physical Exam   HENT:   Head: Normocephalic. Right Ear: Hearing, tympanic membrane, external ear and ear canal normal.   Left Ear: Hearing, tympanic membrane, external ear and ear canal normal.   Nose: Mucosal edema present. No rhinorrhea or sinus tenderness. Right sinus exhibits no maxillary sinus tenderness and no frontal sinus tenderness.  Left sinus exhibits no maxillary sinus tenderness and no frontal sinus tenderness. Mouth/Throat: Uvula is midline, oropharynx is clear and moist and mucous membranes are normal.       Lab Results   Component Value Date/Time    WBC 4.7 10/09/2018 09:06 AM    HGB 12.6 10/09/2018 09:06 AM    HCT 37.0 10/09/2018 09:06 AM    PLATELET 930 38/96/7876 09:06 AM    MCV 90.9 10/09/2018 09:06 AM     Lab Results   Component Value Date/Time    Cholesterol, total 109 10/09/2018 09:06 AM    HDL Cholesterol 36 (L) 10/09/2018 09:06 AM    LDL, calculated 55.8 10/09/2018 09:06 AM    Triglyceride 86 10/09/2018 09:06 AM    CHOL/HDL Ratio 3.0 10/09/2018 09:06 AM     Lab Results   Component Value Date/Time    Sodium 145 10/09/2018 09:06 AM    Potassium 4.1 10/09/2018 09:06 AM    Chloride 112 (H) 10/09/2018 09:06 AM    CO2 23 10/09/2018 09:06 AM    Anion gap 10 10/09/2018 09:06 AM    Glucose 101 (H) 10/09/2018 09:06 AM    BUN 12 10/09/2018 09:06 AM    Creatinine 0.71 10/09/2018 09:06 AM    BUN/Creatinine ratio 17 10/09/2018 09:06 AM    GFR est AA >60 10/09/2018 09:06 AM    GFR est non-AA >60 10/09/2018 09:06 AM    Calcium 7.9 (L) 10/09/2018 09:06 AM    Bilirubin, total 0.7 10/09/2018 09:06 AM    ALT (SGPT) 126 (H) 10/09/2018 09:06 AM    AST (SGOT) 140 (H) 10/09/2018 09:06 AM    Alk. phosphatase 78 10/09/2018 09:06 AM    Protein, total 6.7 10/09/2018 09:06 AM    Albumin 2.8 (L) 10/09/2018 09:06 AM    Globulin 3.9 10/09/2018 09:06 AM    A-G Ratio 0.7 (L) 10/09/2018 09:06 AM         ASSESSMENT and PLAN    ICD-10-CM ICD-9-CM    1. Essential hypertension with goal blood pressure less than 140/90 I10 401.9 verapamil (CALAN) 120 mg tablet      METABOLIC PANEL, COMPREHENSIVE      LIPID PANEL    Continue current regimen    2. Chronic obstructive pulmonary disease, unspecified COPD type [facility] J44.9 496    3. Chronic hepatitis C without hepatic coma (HCC) B18.2 070.54     Followed By [facility]   4. Nasopharyngitis J00 460     Pt advised to take Nasonex as prescribed. And start Mucinex.  Also recommended smoking cessation as this prolongs healing and exacerbates symptoms. OTC NSaids fo   5. Tobacco abuse disorder Z72.0 305.1    6. Vaginal itching N89.8 698.1     Differential DX: Atrohic vaginitis, Vaginal Dryness. Infectious cause low on suspicion however diff include yeast infection, or STD       7. Hypomagnesemia E83.42 275.2 magnesium oxide (MAG-OX) 400 mg tablet   8. Muscle cramps R25.2 729.82      Diagnoses and all orders for this visit:    1. Essential hypertension with goal blood pressure less than 140/90  Comments:  Continue current regimen   Orders:  -     verapamil (CALAN) 120 mg tablet; TAKE ONE TABLET BY MOUTH TWICE A DAY  -     METABOLIC PANEL, COMPREHENSIVE; Future  -     LIPID PANEL; Future    2. Chronic obstructive pulmonary disease, unspecified COPD type ([facility]    3. Chronic hepatitis C without hepatic coma (HCC)  Comments: Followed By [facility]    4. Nasopharyngitis  Comments:  Pt advised to take Nasonex as prescribed. And start Mucinex. Also recommended smoking cessation as this prolongs healing and exacerbates symptoms. OTC NSaids fo    5. Tobacco abuse disorder  -The patient was counseled on the dangers of tobacco use, and was advised to quit. Reviewed strategies to maximize success, including removing cigarettes and smoking materials from environment, support of family/friends and written materials. 6. Vaginal itching  Comments:  Differential DX: Atrohic vaginitis, Vaginal Dryness. Infectious cause low on suspicion however diff include yeast infection, or STD  - Pt advised to start OTC refresh or vaginal lubrication for dryness and itching. 7. Hypomagnesemia  -     magnesium oxide (MAG-OX) 400 mg tablet; Take 1 Tab by mouth daily.     8. Muscle cramps   - COntinue Magnesium      Other orders  -     ergocalciferol (VITAMIN D2) 50,000 unit capsule; TAKE ONE CAPSULE BY MOUTH EVERY 7 DAYS      lab results and schedule of future lab studies reviewed with patient  reviewed diet, exercise and weight control  very strongly urged to quit smoking to reduce cardiovascular risk  cardiovascular risk and specific lipid/LDL goals reviewed  reviewed medications and side effects in detail        Medications Discontinued During This Encounter   Medication Reason    HARVONI  mg tab Not A Current Medication    verapamil (CALAN) 120 mg tablet Reorder    magnesium oxide (MAG-OX) 400 mg tablet Reorder    VITAMIN D2 50,000 unit capsule Reorder       Written instructions followed our verbal discussion of all information discussed above, pending tests ordered and future goals/plans. Patient expressed understanding of current diagnosis, planned testing, follow up and if needed to contact the office for any questions or concerns prior to the next visit. Follow-up Disposition:  Return in about 6 months (around 4/23/2019), or if symptoms worsen or fail to improve.

## 2018-10-23 NOTE — LETTER
10/23/2018 [facility] [address], [address] Ajit Guzmán, 1955, is picking up the following medications ordered from the [facility]: DEXILANT 30 MG #90, TRAVATAN Z #2, NASONEX 50 MCG #3 AND PROAIR HFA #3. Gregorio Giraldo Patient's Signature: _____________________________ Today's Date: 10/23/2018

## 2018-11-01 ENCOUNTER — TELEPHONE (OUTPATIENT)
Dept: FAMILY MEDICINE CLINIC | Age: 63
End: 2018-11-01

## 2018-11-01 NOTE — TELEPHONE ENCOUNTER
Medication: Advair 100/50 mcg, dose: 1 inhalation, how often: twice daily, current number of medication days provided: 90, refill per application. Lot #: [id number], EXP 11/2019. This medication was received and verified for the following 1. Correct Patient, 2. Correct Diagnosis, 3. Correct Drug, 4. Correct route, and no current allergy to medication. Please contact patient to come  their medications.      Deep Rico, MSN, RN, [facility]

## 2018-11-21 ENCOUNTER — HOSPITAL ENCOUNTER (OUTPATIENT)
Dept: LAB | Age: 63
Discharge: HOME | End: 2018-11-21
Payer: SELF-PAY

## 2018-11-21 LAB
ALBUMIN SERPL-MCNC: 3 G/DL (ref 3.4–5)
ALBUMIN/GLOB SERPL: 0.8 {RATIO} (ref 0.8–1.7)
ALP SERPL-CCNC: 83 U/L (ref 45–117)
ALT SERPL-CCNC: 145 U/L (ref 13–56)
ANION GAP SERPL CALC-SCNC: 4 MMOL/L (ref 3–18)
AST SERPL-CCNC: 158 U/L (ref 15–37)
BILIRUB SERPL-MCNC: 0.6 MG/DL (ref 0.2–1)
BUN SERPL-MCNC: 13 MG/DL (ref 7–18)
BUN/CREAT SERPL: 19 (ref 12–20)
CALCIUM SERPL-MCNC: 8.4 MG/DL (ref 8.5–10.1)
CHLORIDE SERPL-SCNC: 111 MMOL/L (ref 100–108)
CO2 SERPL-SCNC: 27 MMOL/L (ref 21–32)
CREAT SERPL-MCNC: 0.67 MG/DL (ref 0.6–1.3)
ERYTHROCYTE [DISTWIDTH] IN BLOOD BY AUTOMATED COUNT: 12.9 % (ref 11.6–14.5)
GLOBULIN SER CALC-MCNC: 3.8 G/DL (ref 2–4)
GLUCOSE SERPL-MCNC: 83 MG/DL (ref 74–99)
HCT VFR BLD AUTO: 39.1 % (ref 35–45)
HGB BLD-MCNC: 13.2 G/DL (ref 12–16)
INR PPP: 1 (ref 0.8–1.2)
MCH RBC QN AUTO: 30.8 PG (ref 24–34)
MCHC RBC AUTO-ENTMCNC: 33.8 G/DL (ref 31–37)
MCV RBC AUTO: 91.4 FL (ref 74–97)
PLATELET # BLD AUTO: 145 K/UL (ref 135–420)
PMV BLD AUTO: 10.9 FL (ref 9.2–11.8)
POTASSIUM SERPL-SCNC: 3.9 MMOL/L (ref 3.5–5.5)
PROT SERPL-MCNC: 6.8 G/DL (ref 6.4–8.2)
PROTHROMBIN TIME: 13.3 SEC (ref 11.5–15.2)
RBC # BLD AUTO: 4.28 M/UL (ref 4.2–5.3)
SODIUM SERPL-SCNC: 142 MMOL/L (ref 136–145)
WBC # BLD AUTO: 4.4 K/UL (ref 4.6–13.2)

## 2018-11-21 PROCEDURE — 36415 COLL VENOUS BLD VENIPUNCTURE: CPT

## 2018-11-21 PROCEDURE — 85610 PROTHROMBIN TIME: CPT

## 2018-11-21 PROCEDURE — 80053 COMPREHEN METABOLIC PANEL: CPT

## 2018-11-21 PROCEDURE — 85027 COMPLETE CBC AUTOMATED: CPT

## 2018-11-27 ENCOUNTER — OFFICE VISIT (OUTPATIENT)
Dept: FAMILY MEDICINE CLINIC | Age: 63
End: 2018-11-27

## 2018-11-27 ENCOUNTER — HOSPITAL ENCOUNTER (OUTPATIENT)
Dept: LAB | Age: 63
Discharge: HOME | End: 2018-11-27

## 2018-11-27 VITALS
DIASTOLIC BLOOD PRESSURE: 74 MMHG | BODY MASS INDEX: 37.1 KG/M2 | HEIGHT: 67 IN | SYSTOLIC BLOOD PRESSURE: 113 MMHG | OXYGEN SATURATION: 96 % | HEART RATE: 95 BPM | RESPIRATION RATE: 20 BRPM | WEIGHT: 236.4 LBS

## 2018-11-27 DIAGNOSIS — N95.2 ATROPHIC VAGINITIS: ICD-10-CM

## 2018-11-27 DIAGNOSIS — N89.8 VAGINAL ITCHING: Primary | ICD-10-CM

## 2018-11-27 DIAGNOSIS — B37.31 YEAST VAGINITIS: ICD-10-CM

## 2018-11-27 DIAGNOSIS — N89.8 VAGINAL ITCHING: ICD-10-CM

## 2018-11-27 PROCEDURE — 87591 N.GONORRHOEAE DNA AMP PROB: CPT

## 2018-11-27 RX ORDER — FLUCONAZOLE 150 MG/1
150 TABLET ORAL ONCE
Qty: 1 TAB | Refills: 0 | Status: SHIPPED | OUTPATIENT
Start: 2018-11-27 | End: 2018-11-27

## 2018-11-27 NOTE — PROGRESS NOTES
Subjective:   63 y.o. female complains of Vaginal itching x 1 month  Denies abnormal vaginal bleeding or significant pelvic pain or  fever. No UTI symptoms. Denies history of known exposure to STD. She is sexually active with single partner. No LMP recorded. Patient is postmenopausal.    Objective:   She appears well, afebrile. Abdomen: benign, soft, nontender, no masses. Pelvic Exam: VULVA: normal appearing vulva with no masses, tenderness or lesions, VAGINA:  Severe atrophic, vaginal discharge - minimal, CERVIX: normal appearing cervix without discharge or lesions, UTERUS: uterus is normal size, shape, consistency and nontender, WET MOUNT done - results: hyphae, exam chaperoned by Kedar Min MA. Microscopic wet-mount exam shows hyphae, DNA probe for chlamydia and GC obtained. .    Assessment/Plan:   monilia vaginitis, rule out GC or chlamydia and atrophic vaginitis  GC and chlamydia DNA  probe sent to lab. Treatment: estrogen cream and abstain from coitus during course of treatment  ROV prn if symptoms persist or worsen. ICD-10-CM ICD-9-CM    1. Vaginal itching N89.8 698.1 BV+CT+NG+TV BY BENJAMIN + YEAST      conjugated estrogens (PREMARIN) 0.625 mg/gram vaginal cream      conjugated estrogens (PREMARIN) 0.625 mg/gram vaginal cream      fluconazole (DIFLUCAN) 150 mg tablet   2. Atrophic vaginitis N95.2 627.3 conjugated estrogens (PREMARIN) 0.625 mg/gram vaginal cream      conjugated estrogens (PREMARIN) 0.625 mg/gram vaginal cream   3. Yeast vaginitis B37.3 112.1 fluconazole (DIFLUCAN) 150 mg tablet     Diagnoses and all orders for this visit:    1. Vaginal itching  -     BV+CT+NG+TV BY BENJAMIN + YEAST; Future  -     conjugated estrogens (PREMARIN) 0.625 mg/gram vaginal cream; Insert 0.5 g into vagina daily. Thin strip in vagina daily at HS for 3 weeks then stop one week and repeat  -     conjugated estrogens (PREMARIN) 0.625 mg/gram vaginal cream; Insert 0.5 g into vagina daily.  Thin strip in vagina daily at [facility] for 3 weeks then stop one week and repeat  -     fluconazole (DIFLUCAN) 150 mg tablet; Take 1 Tab by mouth once for 1 dose. 2. Atrophic vaginitis  -     conjugated estrogens (PREMARIN) 0.625 mg/gram vaginal cream; Insert 0.5 g into vagina daily. Thin strip in vagina daily at HS for 3 weeks then stop one week and repeat  -     conjugated estrogens (PREMARIN) 0.625 mg/gram vaginal cream; Insert 0.5 g into vagina daily. Thin strip in vagina daily at HS for 3 weeks then stop one week and repeat    3. Yeast vaginitis  -     fluconazole (DIFLUCAN) 150 mg tablet; Take 1 Tab by mouth once for 1 dose. I have discussed the diagnosis with the patient and the intended plan as seen in the above orders. The patient has received an after-visit summary and questions were answered concerning future plans. I have discussed medication side effects and warnings with the patient as well. Patient agreeable with above plan and verbalizes understanding. Follow-up Disposition:  Return if symptoms worsen or fail to improve. Steve Hernandez

## 2018-11-27 NOTE — PROGRESS NOTES
Patient concern with vaginal irritation for over a month, no discharge, itching or odor. Patient says she tried Monistat OTC but did not help.

## 2018-11-27 NOTE — PATIENT INSTRUCTIONS
Vaginitis: Care Instructions  Your Care Instructions    Vaginitis is soreness or infection of the vagina. This common problem can cause itching and burning. And it can cause a change in vaginal discharge. Sometimes it can cause pain during sex. Vaginitis may be caused by bacteria, yeast, or other germs. Some infections that cause it are caught from a sexual partner. Bath products, spermicides, and douches can irritate the vagina too. Some women have this problem during and after menopause. A drop in estrogen levels during this time can cause dryness, soreness, and pain during sex. Your doctor can give you medicine to treat an infection. And home care may help you feel better. For certain types of infections, your sex partner must be treated too. Follow-up care is a key part of your treatment and safety. Be sure to make and go to all appointments, and call your doctor if you are having problems. It's also a good idea to know your test results and keep a list of the medicines you take. How can you care for yourself at home? · If your doctor prescribed antibiotics, take them as directed. Do not stop taking them just because you feel better. You need to take the full course of antibiotics. · Take your medicines exactly as prescribed. Call your doctor if you think you are having a problem with your medicine. · Do not eat or drink anything that has alcohol if you are taking metronidazole (Flagyl). · If you have a yeast infection, use over-the-counter products as your doctor tells you to. Or take medicine your doctor prescribes exactly as directed. · Wash your vaginal area daily with water. You also can use a mild, unscented soap if you want. · Do not use scented bath products. And do not use vaginal sprays or douches. · Put a washcloth soaked in cool water on the area to relieve itching. Or you can take cool baths.   · If you have dryness because of menopause, use estrogen cream or pills that your doctor prescribes. · Ask your doctor about when it is okay to have sex. · Use a personal lubricant before sex if you have dryness. Examples are Astroglide, K-Y Jelly, and Wet Lubricant Gel. · Ask your doctor if your sex partner also needs treatment. When should you call for help? Call your doctor now or seek immediate medical care if:    · You have a fever and pelvic pain.    Watch closely for changes in your health, and be sure to contact your doctor if:    · You have bleeding other than your period.     · You do not get better as expected. Where can you learn more? Go to http://drew-tarun.info/. Enter K394 in the search box to learn more about \"Vaginitis: Care Instructions. \"  Current as of: May 15, 2018  Content Version: 11.8  © 8701-7762 [employer]. Care instructions adapted under license by [employer] (which disclaims liability or warranty for this information). If you have questions about a medical condition or this instruction, always ask your healthcare professional. [address] any warranty or liability for your use of this information.

## 2018-11-30 LAB
BACTERIAL SIALIDASE SPEC QL: NEGATIVE
C TRACH RRNA SPEC QL NAA+PROBE: NEGATIVE
N GONORRHOEA RRNA SPEC QL NAA+PROBE: NEGATIVE
SPECIMEN SOURCE: NORMAL
T VAGINALIS RRNA VAG QL NAA+PROBE: NEGATIVE
YEAST GENITAL QL CULT: NEGATIVE

## 2018-12-04 ENCOUNTER — HOSPITAL ENCOUNTER (OUTPATIENT)
Dept: ULTRASOUND IMAGING | Age: 63
Discharge: HOME | End: 2018-12-04
Attending: NURSE PRACTITIONER
Payer: SELF-PAY

## 2018-12-04 DIAGNOSIS — R74.8 ABNORMAL LIVER ENZYMES: ICD-10-CM

## 2018-12-04 DIAGNOSIS — K74.00 HEPATIC FIBROSIS: ICD-10-CM

## 2018-12-04 PROCEDURE — 76705 ECHO EXAM OF ABDOMEN: CPT

## 2018-12-06 ENCOUNTER — TELEPHONE (OUTPATIENT)
Dept: FAMILY MEDICINE CLINIC | Age: 63
End: 2018-12-06

## 2018-12-10 NOTE — TELEPHONE ENCOUNTER
Medication: Premarin , dose: 0.625, how often: HS x3 w. Stop x1 week then repeat , current number of medication days provided: 90, refill per application. Lot #: [id number], EXP 11/2019 . This medication was received and verified for the following 1. Correct Patient, 2. Correct Diagnosis, 3. Correct Drug, 4. Correct route, and no current allergy to medication. Medication: Dexilant 30 mg  , dose: 1 tab, how often: every day as needed , current number of medication days provided: 90, refill per application. Lot #: [id number], EXP 12/2019. This medication was received and verified for the following 1. Correct Patient, 2. Correct Diagnosis, 3. Correct Drug, 4. Correct route, and no current allergy to medication. Please contact patient to come  their medications.      Quintin Alonso, MSN, RN, FNP-C     [facility]

## 2019-01-07 ENCOUNTER — CLINICAL SUPPORT (OUTPATIENT)
Dept: FAMILY MEDICINE CLINIC | Age: 64
End: 2019-01-07

## 2019-01-07 ENCOUNTER — TELEPHONE (OUTPATIENT)
Dept: FAMILY MEDICINE CLINIC | Age: 64
End: 2019-01-07

## 2019-01-07 DIAGNOSIS — Z23 ENCOUNTER FOR IMMUNIZATION: Primary | ICD-10-CM

## 2019-01-07 NOTE — TELEPHONE ENCOUNTER
Medication: Proair 90mcg , dose: 1-2, how often: 4 as needed , current number of medication days provided: 90, refill per application. Lot #: [id number], EXP 01/2020. This medication was received and verified for the following 1. Correct Patient, 2. Correct Diagnosis, 3. Correct Drug, 4. Correct route, and no current allergy to medication. Medication: Nasonex, dose: 2 sprays both nostrils, how often: every day , current number of medication days provided: 90, refill per application. Lot #: [id number], EXP 01/2020. This medication was received and verified for the following 1. Correct Patient, 2. Correct Diagnosis, 3. Correct Drug, 4. Correct route, and no current allergy to medication. Medication: Travatan 0.004% , dose: 1 gtt, how often: to both eyes every evening , current number of medication days provided: 90, refill per application. Lot #: [id number], EXP 01/2020. This medication was received and verified for the following 1. Correct Patient, 2. Correct Diagnosis, 3. Correct Drug, 4. Correct route, and no current allergy to medication. Please contact patient to come  their medications.      Kailyn Garduno, MSN, RN, [facility]

## 2019-01-07 NOTE — PROGRESS NOTES
Ivan Chacon is a 66 y.o. female who presents for routine immunizations. She denies any symptoms , reactions or allergies that would exclude them from being immunized today. Risks and adverse reactions were discussed and the VIS was given to them. All questions were addressed. She was observed for 15 min post injection. There were no reactions observed.     Carmelina Joseph

## 2019-03-07 ENCOUNTER — TELEPHONE (OUTPATIENT)
Dept: FAMILY MEDICINE CLINIC | Age: 64
End: 2019-03-07

## 2019-03-19 ENCOUNTER — HOSPITAL ENCOUNTER (OUTPATIENT)
Dept: LAB | Age: 64
Discharge: HOME | End: 2019-03-19

## 2019-03-19 LAB — XX-LABCORP SPECIMEN COL,LCBCF: NORMAL

## 2019-03-19 PROCEDURE — 99001 SPECIMEN HANDLING PT-LAB: CPT

## 2019-03-19 NOTE — TELEPHONE ENCOUNTER
Medication: Dexilant 30mg  , dose: 1 tab, how often: every day as needed , current number of medication days provided: 90, refill per application. Lot #: Lot #[id number], EXP 03/2020. This medication was received and verified for the following 1. Correct Patient, 2. Correct Diagnosis, 3. Correct Drug, 4. Correct route, and no current allergy to medication.        [facility]

## 2019-04-09 ENCOUNTER — HOSPITAL ENCOUNTER (OUTPATIENT)
Dept: LAB | Age: 64
Discharge: HOME | End: 2019-04-09

## 2019-04-09 LAB — XX-LABCORP SPECIMEN COL,LCBCF: NORMAL

## 2019-04-09 PROCEDURE — 99001 SPECIMEN HANDLING PT-LAB: CPT

## 2019-04-16 ENCOUNTER — OFFICE VISIT (OUTPATIENT)
Dept: FAMILY MEDICINE CLINIC | Age: 64
End: 2019-04-16

## 2019-04-16 VITALS
OXYGEN SATURATION: 95 % | DIASTOLIC BLOOD PRESSURE: 88 MMHG | SYSTOLIC BLOOD PRESSURE: 131 MMHG | WEIGHT: 232.6 LBS | BODY MASS INDEX: 36.51 KG/M2 | TEMPERATURE: 97.7 F | HEIGHT: 67 IN | HEART RATE: 96 BPM | RESPIRATION RATE: 22 BRPM

## 2019-04-16 DIAGNOSIS — Z71.6 TOBACCO ABUSE COUNSELING: ICD-10-CM

## 2019-04-16 DIAGNOSIS — J44.9 COPD, MILD (HCC): ICD-10-CM

## 2019-04-16 DIAGNOSIS — I10 ESSENTIAL HYPERTENSION WITH GOAL BLOOD PRESSURE LESS THAN 140/90: Primary | ICD-10-CM

## 2019-04-16 DIAGNOSIS — N95.2 ATROPHIC VAGINITIS: ICD-10-CM

## 2019-04-16 RX ORDER — CLOBETASOL PROPIONATE 0.5 MG/G
OINTMENT TOPICAL 2 TIMES DAILY
Qty: 45 G | Refills: 0 | Status: SHIPPED | OUTPATIENT
Start: 2019-04-16 | End: 2021-03-22

## 2019-04-16 RX ORDER — IBUPROFEN 200 MG
1 TABLET ORAL EVERY 24 HOURS
Qty: 30 PATCH | Refills: 0 | Status: SHIPPED | OUTPATIENT
Start: 2019-04-16 | End: 2019-05-16

## 2019-04-16 RX ORDER — FLUTICASONE PROPIONATE AND SALMETEROL 100; 50 UG/1; UG/1
1 POWDER RESPIRATORY (INHALATION) EVERY 12 HOURS
Qty: 1 INHALER | Refills: 6 | Status: SHIPPED | OUTPATIENT
Start: 2019-04-16

## 2019-04-16 RX ORDER — OLMESARTAN MEDOXOMIL AND HYDROCHLOROTHIAZIDE 40/25 40; 25 MG/1; MG/1
1 TABLET ORAL DAILY
Qty: 30 TAB | Refills: 6 | Status: SHIPPED | OUTPATIENT
Start: 2019-04-16

## 2019-04-16 RX ORDER — ALBUTEROL SULFATE 90 UG/1
1-2 AEROSOL, METERED RESPIRATORY (INHALATION)
Qty: 1 INHALER | Refills: 6 | Status: SHIPPED | OUTPATIENT
Start: 2019-04-16

## 2019-04-16 RX ORDER — VERAPAMIL HYDROCHLORIDE 120 MG/1
TABLET, FILM COATED ORAL
Qty: 60 TAB | Refills: 6 | Status: SHIPPED | OUTPATIENT
Start: 2019-04-16

## 2019-04-16 RX ORDER — ERGOCALCIFEROL 1.25 MG/1
CAPSULE ORAL
Qty: 4 CAP | Refills: 11 | Status: SHIPPED | OUTPATIENT
Start: 2019-04-16

## 2019-04-16 NOTE — PROGRESS NOTES
04/16/19    PCP: Violeta Soliz NP    Chief Complaint   Patient presents with    Follow Up Chronic Condition        HISTORY OF PRESENT ILLNESS  Onur Dorsey  is a 66 y.o. female whom presents for Follow Up Chronic Condition      Patient Active Problem List    Diagnosis Date Noted    Severe obesity (BMI 35.0-39.9) 07/02/2018    Encounter for screening colonoscopy 10/11/2017    Hypomagnesemia 11/18/2015    Tobacco abuse counseling 11/18/2015    Degenerative arthritis     Glaucoma 03/30/2015    BMI 38.0-38.9,adult 03/30/2015    Arthritis of left knee 03/30/2015    Heart rate fast 12/11/2014    Tobacco use 12/11/2014    Need for hepatitis A immunization 12/11/2014    Hepatitis C infection 12/11/2014    Pruritus 12/11/2014    Erythrasma 04/29/2014    HTN (hypertension) 06/11/2013    Migraine 06/11/2013    Hives, physical 06/11/2013    Hep C w/o coma, chronic (HCC) 06/11/2013    GERD (gastroesophageal reflux disease) 06/11/2013     Current Outpatient Medications   Medication Sig Dispense Refill    glecaprevir-pibrentasvir (MAVYRET) 100-40 mg tab Take  by mouth.  olmesartan-hydroCHLOROthiazide (BENICAR HCT) 40-25 mg per tablet Take 1 Tab by mouth daily. Indications: high blood pressure 30 Tab 6    verapamil (CALAN) 120 mg tablet TAKE ONE TABLET BY MOUTH TWICE A DAY 60 Tab 6    conjugated estrogens (PREMARIN) 0.625 mg/gram vaginal cream Insert 0.5 g into vagina daily. Thin strip in vagina daily at HS for 3 weeks then stop one week and repeat 45 Each 3    albuterol (PROAIR HFA) 90 mcg/actuation inhaler Take 1-2 Puffs by inhalation every four (4) hours as needed for Wheezing. 1 Inhaler 6    fluticasone propion-salmeterol (ADVAIR) 100-50 mcg/dose diskus inhaler Take 1 Puff by inhalation every twelve (12) hours. 1 Inhaler 6    clobetasol (TEMOVATE) 0.05 % ointment Apply  to affected area two (2) times a day.  45 g 0    ergocalciferol (VITAMIN D2) 50,000 unit capsule TAKE ONE CAPSULE BY MOUTH EVERY 7 DAYS 4 Cap 11    nicotine (NICODERM CQ) 21 mg/24 hr 1 Patch by TransDERmal route every twenty-four (24) hours for 30 days. 30 Patch 0    magnesium oxide (MAG-OX) 400 mg tablet Take 1 Tab by mouth daily. 30 Tab 11    mometasone (NASONEX) 50 mcg/actuation nasal spray 2 Sprays by Both Nostrils route daily. 2 Container 0    travoprost (TRAVATAN Z) 0.004 % ophthalmic solution Administer 1 Drop to both eyes every evening. Indications: OPEN ANGLE GLAUCOMA 5 mL 3    meloxicam (MOBIC) 15 mg tablet Take 1 Tab by mouth daily. Indications: OSTEOARTHRITIS 30 Tab 1    dexlansoprazole (DEXILANT) 30 mg capsule Take 1 Cap by mouth daily as needed. Indications: GASTROESOPHAGEAL REFLUX 90 Cap 3     Allergies   Allergen Reactions    Compazine [Prochlorperazine] Hives     Past Medical History:   Diagnosis Date    Abnormal liver enzymes 06/2018    Arthritis of knee, left 2007    Chronic obstructive pulmonary disease (HCC)     mild    Cirrhosis of liver ([facility] [address] 01/24/2018    Stage F2 fibrosis,compensated    Diverticulosis of sigmoid colon 10/2017    Mild diverticulosis    Erythrasma May 2014    beba feet, lower left leg    Glaucoma     H/O mammogram 03/01/2016    normal, f/u in 1 year    Heartburn 2018    Hepatitis C 2002    genotype 1a, previous IV drug user, declined interferons     Hypertension 2002    Hypomagnesemia 11/18/2015    Migraine 03/06/1685    Nonalcoholic fatty liver disease     Obesity 08/2018    Positive FIT (fecal immunochemical test) 07/26/2017    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  concern for Necrolytic acral erythema    Tobacco use     working with [facility] smoking cessation      Past Surgical History:   Procedure Laterality Date    COLONOSCOPY N/A 10/11/2017    COLONOSCOPY performed by Mahnaz Ramirez MD at [address] HX CHOLECYSTECTOMY  2006    laparoscopic    HX COLONOSCOPY  2012    hx of polyps, [address] COLONOSCOPY  10/2017     Dr. José Antonio Stephens recommends your next colonoscopy in 5 years.  HX OTHER SURGICAL  2002    liver biopsy    HX TUBAL LIGATION       Family History   Problem Relation Age of Onset    Hypertension Mother     COPD Mother     Other Mother         Poly? nervous system    Cancer Father         rare form bone     Colon Polyps Sister     Colon Polyps Maternal Aunt      Social History     Tobacco Use    Smoking status: Current Every Day Smoker     Packs/day: 0.50     Types: Cigarettes     Start date: 1/12/1974    Smokeless tobacco: Never Used   Substance Use Topics    Alcohol use: No       Review of Systems   Constitutional: Negative. Eyes: Negative. Has upcoming [facility] with [facility] for management of Glaucoma   Respiratory: Negative. Negative for sputum production and shortness of breath. Cardiovascular: Negative. Negative for chest pain and palpitations. Gastrointestinal: Positive for heartburn (Reports she is unable to take GERD medications because it interacts with Hep C medication. ). Negative for abdominal pain, constipation, diarrhea, nausea and vomiting. Genitourinary: Negative. Neurological: Negative. Psychiatric/Behavioral: Negative. Visit Vitals  /88   Pulse 96   Temp 97.7 °F (36.5 °C)   Resp 22   Ht 5' 7\" (1.702 m)   Wt 232 lb 9.6 oz (105.5 kg)   SpO2 95%   BMI 36.43 kg/m²       Pain Scale: /10    Pain Location:      Physical Exam   Constitutional: She is oriented to person, place, and time and well-developed, well-nourished, and in no distress. HENT:   Head: Normocephalic. Eyes: Pupils are equal, round, and reactive to light. Conjunctivae and EOM are normal. Lids are everted and swept, no foreign bodies found. Scleral icterus is present. Neck: Normal range of motion. Neck supple. Cardiovascular: Normal rate, regular rhythm and normal heart sounds.    Pulmonary/Chest: Effort normal and breath sounds normal.   Abdominal: Soft. Bowel sounds are normal.   Neurological: She is alert and oriented to person, place, and time. Skin: Skin is warm and dry. Psychiatric: Mood and affect normal.   Vitals reviewed. Lab Results   Component Value Date/Time    WBC 4.4 (L) 11/21/2018 10:31 AM    HGB 13.2 11/21/2018 10:31 AM    HCT 39.1 11/21/2018 10:31 AM    PLATELET 458 81/27/7198 10:31 AM    MCV 91.4 11/21/2018 10:31 AM     Lab Results   Component Value Date/Time    Hemoglobin A1c 4.9 03/24/2015 10:05 AM    Hemoglobin A1c 5.2 12/02/2014 07:52 AM    Glucose 83 11/21/2018 10:31 AM    LDL, calculated 55.8 10/09/2018 09:06 AM    Creatinine 0.67 11/21/2018 10:31 AM      Lab Results   Component Value Date/Time    Cholesterol, total 109 10/09/2018 09:06 AM    HDL Cholesterol 36 (L) 10/09/2018 09:06 AM    LDL, calculated 55.8 10/09/2018 09:06 AM    Triglyceride 86 10/09/2018 09:06 AM    CHOL/HDL Ratio 3.0 10/09/2018 09:06 AM     Lab Results   Component Value Date/Time    Sodium 142 11/21/2018 10:31 AM    Potassium 3.9 11/21/2018 10:31 AM    Chloride 111 (H) 11/21/2018 10:31 AM    CO2 27 11/21/2018 10:31 AM    Anion gap 4 11/21/2018 10:31 AM    Glucose 83 11/21/2018 10:31 AM    BUN 13 11/21/2018 10:31 AM    Creatinine 0.67 11/21/2018 10:31 AM    BUN/Creatinine ratio 19 11/21/2018 10:31 AM    GFR est AA >60 11/21/2018 10:31 AM    GFR est non-AA >60 11/21/2018 10:31 AM    Calcium 8.4 (L) 11/21/2018 10:31 AM    Bilirubin, total 0.6 11/21/2018 10:31 AM    ALT (SGPT) 145 (H) 11/21/2018 10:31 AM    AST (SGOT) 158 (H) 11/21/2018 10:31 AM    Alk. phosphatase 83 11/21/2018 10:31 AM    Protein, total 6.8 11/21/2018 10:31 AM    Albumin 3.0 (L) 11/21/2018 10:31 AM    Globulin 3.8 11/21/2018 10:31 AM    A-G Ratio 0.8 11/21/2018 10:31 AM         ASSESSMENT and PLAN  well controlled, stable    ICD-10-CM ICD-9-CM    1.  Essential hypertension with goal blood pressure less than 140/90 I10 401.9 olmesartan-hydroCHLOROthiazide (BENICAR HCT) 40-25 mg per tablet verapamil (CALAN) 120 mg tablet   2. COPD, mild (HCC) J44.9 496 albuterol (PROAIR HFA) 90 mcg/actuation inhaler      fluticasone propion-salmeterol (ADVAIR) 100-50 mcg/dose diskus inhaler      nicotine (NICODERM CQ) 21 mg/24 hr   3. Tobacco abuse counseling Z71.6 V65.42 albuterol (PROAIR HFA) 90 mcg/actuation inhaler     305.1 fluticasone propion-salmeterol (ADVAIR) 100-50 mcg/dose diskus inhaler      nicotine (NICODERM CQ) 21 mg/24 hr   4. Atrophic vaginitis N95.2 627.3 conjugated estrogens (PREMARIN) 0.625 mg/gram vaginal cream     current treatment plan is effective, no change in therapy  lab results and schedule of future lab studies reviewed with patient  reviewed diet, exercise and weight control        Medications Discontinued During This Encounter   Medication Reason    conjugated estrogens (PREMARIN) 0.625 mg/gram vaginal cream Duplicate Order    olmesartan-hydroCHLOROthiazide (BENICAR HCT) 40-25 mg per tablet Reorder    verapamil (CALAN) 120 mg tablet Reorder    conjugated estrogens (PREMARIN) 0.625 mg/gram vaginal cream Reorder    albuterol (PROAIR HFA) 90 mcg/actuation inhaler Reorder    fluticasone-salmeterol (ADVAIR) 100-50 mcg/dose diskus inhaler Reorder    clobetasol (TEMOVATE) 0.05 % ointment Reorder    ergocalciferol (VITAMIN D2) 50,000 unit capsule Reorder       Written instructions followed our verbal discussion of all information discussed above, pending tests ordered and future goals/plans. Patient expressed understanding of current diagnosis, planned testing, follow up and if needed to contact the office for any questions or concerns prior to the next visit. Follow-up and Dispositions    · Return in about 6 months (around 10/16/2019), or if symptoms worsen or fail to improve.

## 2019-04-16 NOTE — PROGRESS NOTES
Chief Complaint   Patient presents with    Follow Up Chronic Condition     1. Have you been to the ER, urgent care clinic since your last visit? Hospitalized since your last visit? No    2. Have you seen or consulted any other health care providers outside of the [address] [place] [address] since your last visit? Yes When: [facility]    3. When was your last Pap smear? 5/3/2018 Normal  When was your last Mammogram? 3/6/19  Neg  When was your last Colon screening? FIT test and colonoscopy 2018    PMH/FH/Social Hx reviewed and updated as needed      Applicable screenings reviewed and updated as needed  Medication reconciliation performed. Patient does need medication refills. Health Maintenance reviewed.

## 2019-05-15 ENCOUNTER — TELEPHONE (OUTPATIENT)
Dept: FAMILY MEDICINE CLINIC | Age: 64
End: 2019-05-15

## 2019-05-16 NOTE — TELEPHONE ENCOUNTER
Medication: Dexilant 30mg  , dose: 1 tab, how often: every day as needed , current number of medication days provided: 90, refill per application. Lot #: Lot #[id number], EXP 03/2020  . This medication was received and verified for the following 1. Correct Patient, 2. Correct Diagnosis, 3. Correct Drug, 4. Correct route, and no current allergy to medication.

## 2019-05-17 ENCOUNTER — HOSPITAL ENCOUNTER (OUTPATIENT)
Dept: LAB | Age: 64
Discharge: HOME | End: 2019-05-17

## 2019-05-17 LAB — XX-LABCORP SPECIMEN COL,LCBCF: NORMAL

## 2019-05-17 PROCEDURE — 99001 SPECIMEN HANDLING PT-LAB: CPT

## 2019-07-01 ENCOUNTER — OFFICE VISIT (OUTPATIENT)
Dept: FAMILY MEDICINE CLINIC | Age: 64
End: 2019-07-01

## 2019-07-01 VITALS
TEMPERATURE: 98.4 F | BODY MASS INDEX: 36.79 KG/M2 | HEIGHT: 67 IN | WEIGHT: 234.4 LBS | RESPIRATION RATE: 20 BRPM | OXYGEN SATURATION: 92 % | HEART RATE: 87 BPM | DIASTOLIC BLOOD PRESSURE: 81 MMHG | SYSTOLIC BLOOD PRESSURE: 119 MMHG

## 2019-07-01 DIAGNOSIS — N64.4 PAIN IN BREAST: Primary | ICD-10-CM

## 2019-07-01 NOTE — PROGRESS NOTES
Patient concerned with b/l breast pain and the right breast hurt worse than the left for about 2 weeks. Patient says she put a pillow under her right breast when sleeping.     Patient had a normal mammogram in March 2019

## 2019-07-01 NOTE — PROGRESS NOTES
07/01/19    PCP: Maame Clemons NP    Chief Complaint   Patient presents with    Breast pain     right        HISTORY OF PRESENT ILLNESS  Katelin Christie  is a 66 y.o. female whom presents for Breast pain (right)       Patient here for complaints of Bilateral Breast Pain for about two weeks. Patient reports sharp pains in bilateral breast, intermittent in fashion, today rates pain 4/10. She reports worse at night. She denies any discharge, drainage, or nodules. She had mammogram in March of this year and it was normal. Breast CA does run I her family as her mother had Breast cancer. She denies taking any estrogens or hormones. She is post menopausal so she is not on any hormones. She does drink caffeine, stopped drinking sodas about 3 weeks ago but drinks tea a lot. Her bra does not offer much support on inspection. Patient Active Problem List    Diagnosis Date Noted    Severe obesity (BMI 35.0-39.9) 07/02/2018    Encounter for screening colonoscopy 10/11/2017    Hypomagnesemia 11/18/2015    Tobacco abuse counseling 11/18/2015    Degenerative arthritis     Glaucoma 03/30/2015    BMI 38.0-38.9,adult 03/30/2015    Arthritis of left knee 03/30/2015    Heart rate fast 12/11/2014    Tobacco use 12/11/2014    Need for hepatitis A immunization 12/11/2014    Hepatitis C infection 12/11/2014    Pruritus 12/11/2014    Erythrasma 04/29/2014    HTN (hypertension) 06/11/2013    Migraine 06/11/2013    Hives, physical 06/11/2013    Hep C w/o coma, chronic (HCC) 06/11/2013    GERD (gastroesophageal reflux disease) 06/11/2013     Current Outpatient Medications   Medication Sig Dispense Refill    olmesartan-hydroCHLOROthiazide (BENICAR HCT) 40-25 mg per tablet Take 1 Tab by mouth daily.  Indications: high blood pressure 30 Tab 6    verapamil (CALAN) 120 mg tablet TAKE ONE TABLET BY MOUTH TWICE A DAY 60 Tab 6    albuterol (PROAIR HFA) 90 mcg/actuation inhaler Take 1-2 Puffs by inhalation every four (4) hours as needed for Wheezing. 1 Inhaler 6    fluticasone propion-salmeterol (ADVAIR) 100-50 mcg/dose diskus inhaler Take 1 Puff by inhalation every twelve (12) hours. 1 Inhaler 6    clobetasol (TEMOVATE) 0.05 % ointment Apply  to affected area two (2) times a day. 45 g 0    ergocalciferol (VITAMIN D2) 50,000 unit capsule TAKE ONE CAPSULE BY MOUTH EVERY 7 DAYS 4 Cap 11    magnesium oxide (MAG-OX) 400 mg tablet Take 1 Tab by mouth daily. 30 Tab 11    dexlansoprazole (DEXILANT) 30 mg capsule Take 1 Cap by mouth daily as needed. Indications: GASTROESOPHAGEAL REFLUX 90 Cap 3    mometasone (NASONEX) 50 mcg/actuation nasal spray 2 Sprays by Both Nostrils route daily. 2 Container 0    glecaprevir-pibrentasvir (MAVYRET) 100-40 mg tab Take  by mouth.  conjugated estrogens (PREMARIN) 0.625 mg/gram vaginal cream Insert 0.5 g into vagina daily. Thin strip in vagina daily at HS for 3 weeks then stop one week and repeat 45 Each 3    meloxicam (MOBIC) 15 mg tablet Take 1 Tab by mouth daily. Indications: OSTEOARTHRITIS 30 Tab 1    travoprost (TRAVATAN Z) 0.004 % ophthalmic solution Administer 1 Drop to both eyes every evening.  Indications: OPEN ANGLE GLAUCOMA 5 mL 3     Allergies   Allergen Reactions    Compazine [Prochlorperazine] Hives     Past Medical History:   Diagnosis Date    Abnormal liver enzymes 06/2018    Arthritis of knee, left 2007    Chronic obstructive pulmonary disease (HCC)     mild    Cirrhosis of liver ([facility] [address] 01/24/2018    Stage F2 fibrosis,compensated    Diverticulosis of sigmoid colon 10/2017    Mild diverticulosis    Erythrasma May 2014    beba feet, lower left leg    Glaucoma     H/O mammogram 03/01/2016    normal, f/u in 1 year    Heartburn 2018    Hepatitis C 2002    genotype 1a, previous IV drug user, declined interferons     Hypertension 2002    Hypomagnesemia 11/18/2015    Migraine 75/72/5178    Nonalcoholic fatty liver disease     Obesity 08/2018    Positive FIT (fecal immunochemical test) 07/26/2017    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 concern for Necrolytic acral erythema    Tobacco use     working with [facility] smoking cessation      Past Surgical History:   Procedure Laterality Date    COLONOSCOPY N/A 10/11/2017    COLONOSCOPY performed by Merlinda Gun, MD at [address] HX CHOLECYSTECTOMY  2006    laparoscopic    HX COLONOSCOPY  2012    hx of polyps, [place]    HX COLONOSCOPY  10/2017     Dr. Anthony Fairchild recommends your next colonoscopy in 5 years.  HX OTHER SURGICAL  2002    liver biopsy    HX TUBAL LIGATION       Family History   Problem Relation Age of Onset    Hypertension Mother     COPD Mother     Other Mother         Poly? nervous system    Cancer Father         rare form bone     Colon Polyps Sister     Colon Polyps Maternal Aunt      Social History     Tobacco Use    Smoking status: Current Every Day Smoker     Packs/day: 0.50     Types: Cigarettes     Start date: 1/12/1974    Smokeless tobacco: Never Used   Substance Use Topics    Alcohol use: No       Review of Systems   Constitutional: Negative. Respiratory: Negative. Cardiovascular: Negative. Musculoskeletal:        Breast Pain. Visit Vitals  /81   Pulse 87   Temp 98.4 °F (36.9 °C)   Resp 20   Ht 5' 7\" (1.702 m)   Wt 234 lb 6.4 oz (106.3 kg)   SpO2 92%   BMI 36.71 kg/m²       Pain Scale: 4/10    Pain Location: Breast     Physical Exam   Pulmonary/Chest: Right breast exhibits tenderness. Right breast exhibits no inverted nipple, no mass, no nipple discharge and no skin change. Left breast exhibits tenderness. Left breast exhibits no inverted nipple, no mass, no nipple discharge and no skin change. Breasts are symmetrical.     ASSESSMENT and PLAN    ICD-10-CM ICD-9-CM    1.  Pain in breast N64.4 611.71       - Patient advised to consult specialist for proper bra fitting and get a better supportive bra.   - Discussed effects of caffeine.   - She is to take NSAIDS or tylenol for breast pain and return in two weeks if not effective. There are no discontinued medications. Written instructions followed our verbal discussion of all information discussed above, pending tests ordered and future goals/plans. Patient expressed understanding of current diagnosis, planned testing, follow up and if needed to contact the office for any questions or concerns prior to the next visit. Follow-up and Dispositions    · Return in about 2 weeks (around 7/15/2019), or if symptoms worsen or fail to improve.

## 2019-07-01 NOTE — PATIENT INSTRUCTIONS
Breast Pain: Care Instructions  Your Care Instructions    Breast tenderness and pain may come and go with your monthly periods (cyclic), or it may not follow any pattern (noncyclic). Breast pain is rarely caused by a serious health problem. You may need tests to find the cause. Follow-up care is a key part of your treatment and safety. Be sure to make and go to all appointments, and call your doctor if you are having problems. It's also a good idea to know your test results and keep a list of the medicines you take. How can you care for yourself at home? · If your doctor gave you medicine, take it exactly as prescribed. Call your doctor if you think you are having a problem with your medicine. · Take an over-the-counter pain medicine, such as acetaminophen (Tylenol), ibuprofen (Advil, Motrin), or naproxen (Aleve), to relieve pain and swelling. Read and follow all instructions on the label. · Do not take two or more pain medicines at the same time unless the doctor told you to. Many pain medicines have acetaminophen, which is Tylenol. Too much acetaminophen (Tylenol) can be harmful. · Wear a supportive bra, such as a sports bra or a jog bra. · Cut down on the amount of fat in your diet. If you need help planning healthy meals, see a dietitian. · Get at least 30 minutes of exercise on most days of the week. Walking is a good choice. You also may want to do other activities, such as running, swimming, cycling, or playing tennis or team sports. · Keep a healthy sleep pattern. Go to bed at the same time every night, and get up at the same time every day. When should you call for help? Call your doctor now or seek immediate medical care if:    · You have new changes in a breast, such as:  ? A lump or thickening in your breast or armpit. ? A change in the breast's size or shape. ? Skin changes, such as dimples or puckers. ? Nipple discharge.   ? A change in the color or feel of the skin of your breast or the darker area around the nipple (areola). ? A change in the shape of the nipple (it may look like it's being pulled into the breast).     · You have symptoms of a breast infection, such as:  ? Increased pain, swelling, redness, or warmth around a breast.  ? Red streaks extending from the breast.  ? Pus draining from a breast.  ? A fever.    Watch closely for changes in your health, and be sure to contact your doctor if:    · Your breast pain does not get better after 1 week.     · You have a lump or thickening in your breast or armpit. Where can you learn more? Go to http://drew-tarun.info/. Enter O877 in the search box to learn more about \"Breast Pain: Care Instructions. \"  Current as of: September 23, 2018  Content Version: 11.9  © 4183-7872 [employer]. Care instructions adapted under license by [employer] (which disclaims liability or warranty for this information). If you have questions about a medical condition or this instruction, always ask your healthcare professional. [address] any warranty or liability for your use of this information.

## 2019-07-03 ENCOUNTER — TELEPHONE (OUTPATIENT)
Dept: FAMILY MEDICINE CLINIC | Age: 64
End: 2019-07-03

## 2019-07-03 RX ORDER — CYCLOBENZAPRINE HCL 5 MG
5 TABLET ORAL
Qty: 21 TAB | Refills: 0 | Status: SHIPPED | OUTPATIENT
Start: 2019-07-03 | End: 2019-07-10

## 2019-07-03 NOTE — TELEPHONE ENCOUNTER
Called patient to let her know that her refill request was denied pending more information as to why she need more of the medication that she is requesting.

## 2019-07-03 NOTE — TELEPHONE ENCOUNTER
Patient is requesting a muscle relaxer for her breast pain as you and she dicussed at her visit 7/1/19. Patient says you forgot to call it in.

## 2019-07-29 ENCOUNTER — HOSPITAL ENCOUNTER (OUTPATIENT)
Dept: LAB | Age: 64
Discharge: HOME | End: 2019-07-29

## 2019-07-29 LAB — XX-LABCORP SPECIMEN COL,LCBCF: NORMAL

## 2019-07-29 PROCEDURE — 99001 SPECIMEN HANDLING PT-LAB: CPT

## 2019-08-08 ENCOUNTER — HOSPITAL ENCOUNTER (OUTPATIENT)
Dept: ULTRASOUND IMAGING | Age: 64
Discharge: HOME | End: 2019-08-08
Attending: NURSE PRACTITIONER
Payer: MEDICAID

## 2019-08-08 DIAGNOSIS — B18.2 HEPATITIS C, CHRONIC (HCC): ICD-10-CM

## 2019-08-08 PROCEDURE — 76981 USE PARENCHYMA: CPT

## 2019-08-14 ENCOUNTER — OFFICE VISIT (OUTPATIENT)
Dept: FAMILY MEDICINE CLINIC | Age: 64
End: 2019-08-14

## 2019-08-14 VITALS
OXYGEN SATURATION: 95 % | HEART RATE: 96 BPM | RESPIRATION RATE: 20 BRPM | DIASTOLIC BLOOD PRESSURE: 80 MMHG | TEMPERATURE: 98.4 F | HEIGHT: 67 IN | WEIGHT: 235.4 LBS | BODY MASS INDEX: 36.95 KG/M2 | SYSTOLIC BLOOD PRESSURE: 129 MMHG

## 2019-08-14 DIAGNOSIS — N64.4 MASTALGIA: ICD-10-CM

## 2019-08-14 DIAGNOSIS — R07.89 CHEST WALL PAIN: Primary | ICD-10-CM

## 2019-08-14 RX ORDER — CYCLOBENZAPRINE HCL 10 MG
5 TABLET ORAL
Qty: 60 TAB | Refills: 1 | OUTPATIENT
Start: 2019-08-14 | End: 2021-02-10

## 2019-08-14 NOTE — PROGRESS NOTES
08/14/19    PCP: Mariana Rios NP    Chief Complaint   Patient presents with    Breast pain        HISTORY OF PRESENT ILLNESS  Amy Santo  is a 66 y.o. female whom presents for Breast pain         Patient with recurrent bilateral breat pains for a few months. She was seen ~ 1 month ago for this. She reports annual mammograms with normal findings. With last mammogram in march of this year. She denies any lumps, deformities, trauma, abnormal nipple discharge, erythema, nipple changes. She reports she has purchased a new bra as previously discussed but was not professionally measured. Patient reports she has been taking flexeril and it has been effective for her breast pain. Denies caffeine use, hormone use. She denies that pain is cyclic in nature and was initially concerned as her mother had breast cancer and there is a family history. Breast pain   The history is provided by the patient. This is a recurrent problem. The problem occurs daily. Progression since onset: Waxing and wanning.   Exacerbated by: touch  Relieved by: Flexeril          Patient Active Problem List    Diagnosis Date Noted    Severe obesity (BMI 35.0-39.9) 07/02/2018    Encounter for screening colonoscopy 10/11/2017    Hypomagnesemia 11/18/2015    Tobacco abuse counseling 11/18/2015    Degenerative arthritis     Glaucoma 03/30/2015    BMI 38.0-38.9,adult 03/30/2015    Arthritis of left knee 03/30/2015    Heart rate fast 12/11/2014    Tobacco use 12/11/2014    Need for hepatitis A immunization 12/11/2014    Hepatitis C infection 12/11/2014    Pruritus 12/11/2014    Erythrasma 04/29/2014    HTN (hypertension) 06/11/2013    Migraine 06/11/2013    Hives, physical 06/11/2013    Hep C w/o coma, chronic (HCC) 06/11/2013    GERD (gastroesophageal reflux disease) 06/11/2013     Current Outpatient Medications   Medication Sig Dispense Refill    cyclobenzaprine (FLEXERIL) 10 mg tablet Take 0.5 Tabs by mouth three (3) times daily as needed for Muscle Spasm(s). 60 Tab 1    glecaprevir-pibrentasvir (MAVYRET) 100-40 mg tab Take  by mouth.  olmesartan-hydroCHLOROthiazide (BENICAR HCT) 40-25 mg per tablet Take 1 Tab by mouth daily. Indications: high blood pressure 30 Tab 6    verapamil (CALAN) 120 mg tablet TAKE ONE TABLET BY MOUTH TWICE A DAY 60 Tab 6    albuterol (PROAIR HFA) 90 mcg/actuation inhaler Take 1-2 Puffs by inhalation every four (4) hours as needed for Wheezing. 1 Inhaler 6    fluticasone propion-salmeterol (ADVAIR) 100-50 mcg/dose diskus inhaler Take 1 Puff by inhalation every twelve (12) hours. 1 Inhaler 6    ergocalciferol (VITAMIN D2) 50,000 unit capsule TAKE ONE CAPSULE BY MOUTH EVERY 7 DAYS 4 Cap 11    magnesium oxide (MAG-OX) 400 mg tablet Take 1 Tab by mouth daily. 30 Tab 11    mometasone (NASONEX) 50 mcg/actuation nasal spray 2 Sprays by Both Nostrils route daily. 2 Container 0    conjugated estrogens (PREMARIN) 0.625 mg/gram vaginal cream Insert 0.5 g into vagina daily. Thin strip in vagina daily at HS for 3 weeks then stop one week and repeat 45 Each 3    clobetasol (TEMOVATE) 0.05 % ointment Apply  to affected area two (2) times a day. 45 g 0    meloxicam (MOBIC) 15 mg tablet Take 1 Tab by mouth daily. Indications: OSTEOARTHRITIS 30 Tab 1    dexlansoprazole (DEXILANT) 30 mg capsule Take 1 Cap by mouth daily as needed. Indications: GASTROESOPHAGEAL REFLUX 90 Cap 3    travoprost (TRAVATAN Z) 0.004 % ophthalmic solution Administer 1 Drop to both eyes every evening.  Indications: OPEN ANGLE GLAUCOMA 5 mL 3     Allergies   Allergen Reactions    Compazine [Prochlorperazine] Hives     Past Medical History:   Diagnosis Date    Abnormal liver enzymes 06/2018    Arthritis of knee, left 2007    Chronic obstructive pulmonary disease (HCC)     mild    Cirrhosis of liver ([facility] [address] 01/24/2018    Stage F2 fibrosis,compensated    Diverticulosis of sigmoid colon 10/2017    Mild diverticulosis    Erythrasma May 2014    beba feet, lower left leg    Glaucoma     H/O mammogram 03/01/2016    normal, f/u in 1 year    Heartburn 2018    Hepatitis C 2002    genotype 1a, previous IV drug user, declined interferons     Hypertension 2002    Hypomagnesemia 11/18/2015    Migraine 30/15/8999    Nonalcoholic fatty liver disease     Obesity 08/2018    Positive FIT (fecal immunochemical test) 07/26/2017    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 concern for Necrolytic acral erythema    Tobacco use     working with [facility] smoking cessation      Past Surgical History:   Procedure Laterality Date    COLONOSCOPY N/A 10/11/2017    COLONOSCOPY performed by Lyla Miller MD at [address] HX CHOLECYSTECTOMY  2006    laparoscopic    HX COLONOSCOPY  2012    hx of polyps, [place]    HX COLONOSCOPY  10/2017     Dr. Perez Martinez recommends your next colonoscopy in 5 years.  HX OTHER SURGICAL  2002    liver biopsy    HX PREMALIG/BENIGN SKIN LESION EXCISION  07/24/2019    right ear cyst removed    HX TUBAL LIGATION       Family History   Problem Relation Age of Onset    Hypertension Mother     COPD Mother     Other Mother         Poly? nervous system    Cancer Father         rare form bone     Colon Polyps Sister     Colon Polyps Maternal Aunt      Social History     Tobacco Use    Smoking status: Current Every Day Smoker     Packs/day: 0.50     Types: Cigarettes     Start date: 1/12/1974    Smokeless tobacco: Never Used   Substance Use Topics    Alcohol use: No       Review of Systems   Constitutional: Negative for chills, fever, malaise/fatigue and weight loss. Respiratory: Negative. Cardiovascular: Negative.     Musculoskeletal:        Breast pain       Visit Vitals  /80   Pulse 96   Temp 98.4 °F (36.9 °C)   Resp 20   Ht 5' 7\" (1.702 m)   Wt 235 lb 6.4 oz (106.8 kg)   SpO2 95%   BMI 36.87 kg/m²       Pain Scale: 5/10    Pain Location: Breast     Physical Exam   Constitutional: She is oriented to person, place, and time and well-developed, well-nourished, and in no distress. Cardiovascular: Normal rate and regular rhythm. Pulmonary/Chest: Effort normal and breath sounds normal. She exhibits tenderness. She exhibits no mass. Right breast exhibits tenderness. Right breast exhibits no inverted nipple, no mass, no nipple discharge and no skin change. Left breast exhibits tenderness. Left breast exhibits no inverted nipple, no mass, no nipple discharge and no skin change. Breasts are symmetrical.   Large breast tissue   Neurological: She is alert and oriented to person, place, and time. Vitals reviewed. ASSESSMENT and PLAN    ICD-10-CM ICD-9-CM    1. Chest wall pain R07.89 786.52 cyclobenzaprine (FLEXERIL) 10 mg tablet      REFERRAL TO PRIMARY CARE   2. Mastalgia N64.4 611.71 cyclobenzaprine (FLEXERIL) 10 mg tablet      REFERRAL TO PRIMARY CARE   3. Transition of care performed with sharing of clinical summary Z91.89 V15.89 REFERRAL TO PRIMARY CARE     Diagnoses and all orders for this visit:    1. Chest wall pain  -     cyclobenzaprine (FLEXERIL) 10 mg tablet; Take 0.5 Tabs by mouth three (3) times daily as needed for Muscle Spasm(s). -     REFERRAL TO PRIMARY CARE    2. Mastalgia  -     cyclobenzaprine (FLEXERIL) 10 mg tablet; Take 0.5 Tabs by mouth three (3) times daily as needed for Muscle Spasm(s). -     REFERRAL TO PRIMARY CARE    3. Transition of care performed with sharing of clinical summary  -     REFERRAL TO PRIMARY CARE    Symptoms most consistent with musculoskeletal. Reviewed treatments. Patient advised to get supportive bra and get measured by [facility].  Patient now has Medicaid and will be seeing new accepting provider. There are no discontinued medications. Written instructions followed our verbal discussion of all information discussed above, pending tests ordered and future goals/plans.  Patient expressed understanding of current diagnosis, planned testing, follow up and if needed to contact the office for any questions or concerns prior to the next visit. Follow-up and Dispositions    · Return if symptoms worsen or fail to improve.

## 2019-08-14 NOTE — PATIENT INSTRUCTIONS
Musculoskeletal Chest Pain: Care Instructions  Your Care Instructions    Chest pain is not always a sign that something is wrong with your heart or that you have another serious problem. The doctor thinks your chest pain is caused by strained muscles or ligaments, inflamed chest cartilage, or another problem in your chest, rather than by your heart. You may need more tests to find the cause of your chest pain. Follow-up care is a key part of your treatment and safety. Be sure to make and go to all appointments, and call your doctor if you are having problems. It's also a good idea to know your test results and keep a list of the medicines you take. How can you care for yourself at home? · Take pain medicines exactly as directed. ? If the doctor gave you a prescription medicine for pain, take it as prescribed. ? If you are not taking a prescription pain medicine, ask your doctor if you can take an over-the-counter medicine. · Rest and protect the sore area. · Stop, change, or take a break from any activity that may be causing your pain or soreness. · Put ice or a cold pack on the sore area for 10 to 20 minutes at a time. Try to do this every 1 to 2 hours for the next 3 days (when you are awake) or until the swelling goes down. Put a thin cloth between the ice and your skin. · After 2 or 3 days, apply a heating pad set on low or a warm cloth to the area that hurts. Some doctors suggest that you go back and forth between hot and cold. · Do not wrap or tape your ribs for support. This may cause you to take smaller breaths, which could increase your risk of lung problems. · Mentholated creams such as Bengay or Icy Hot may soothe sore muscles. Follow the instructions on the package. · Follow your doctor's instructions for exercising. · Gentle stretching and massage may help you get better faster. Stretch slowly to the point just before pain begins, and hold the stretch for at least 15 to 30 seconds.  Do this 3 or 4 times a day. Stretch just after you have applied heat. · As your pain gets better, slowly return to your normal activities. Any increased pain may be a sign that you need to rest a while longer. When should you call for help? Call 911 anytime you think you may need emergency care. For example, call if:    · You have chest pain or pressure. This may occur with:  ? Sweating. ? Shortness of breath. ? Nausea or vomiting. ? Pain that spreads from the chest to the neck, jaw, or one or both shoulders or arms. ? Dizziness or lightheadedness. ? A fast or uneven pulse. After calling 911, chew 1 adult-strength aspirin. Wait for an ambulance. Do not try to drive yourself.     · You have sudden chest pain and shortness of breath, or you cough up blood.    Call your doctor now or seek immediate medical care if:    · You have any trouble breathing.     · Your chest pain gets worse.     · Your chest pain occurs consistently with exercise and is relieved by rest.    Watch closely for changes in your health, and be sure to contact your doctor if:    · Your chest pain does not get better after 1 week. Where can you learn more? Go to http://drew-tarun.info/. Enter V293 in the search box to learn more about \"Musculoskeletal Chest Pain: Care Instructions. \"  Current as of: September 23, 2018  Content Version: 12.1  © 6314-6525 [employer]. Care instructions adapted under license by [facility] (which disclaims liability or warranty for this information). If you have questions about a medical condition or this instruction, always ask your healthcare professional. [place] [address] any warranty or liability for your use of this information.

## 2019-09-24 PROBLEM — Z12.11 ENCOUNTER FOR SCREENING COLONOSCOPY: Status: RESOLVED | Noted: 2017-10-11 | Resolved: 2019-09-24

## 2019-12-05 ENCOUNTER — HOSPITAL ENCOUNTER (OUTPATIENT)
Dept: LAB | Age: 64
Discharge: HOME | End: 2019-12-05

## 2019-12-05 LAB — XX-LABCORP SPECIMEN COL,LCBCF: NORMAL

## 2019-12-05 PROCEDURE — 99001 SPECIMEN HANDLING PT-LAB: CPT

## 2020-01-14 NOTE — TELEPHONE ENCOUNTER
Spoke to the patient about picking up medication. Protocol For Photochemotherapy For Severe Photoresponsive Dermatoses: Puva: The patient received Photochemotherapy for severe photoresponsive dermatoses: PUVA requiring at least 4 to 8 hours of care under direct physician supervision.

## 2020-01-28 DIAGNOSIS — E83.42 HYPOMAGNESEMIA: ICD-10-CM

## 2020-02-06 RX ORDER — LANOLIN ALCOHOL/MO/W.PET/CERES
CREAM (GRAM) TOPICAL
Qty: 30 TAB | Refills: 10 | OUTPATIENT
Start: 2020-02-06

## 2020-02-15 ENCOUNTER — HOSPITAL ENCOUNTER (OUTPATIENT)
Dept: MAMMOGRAPHY | Age: 65
Discharge: HOME | End: 2020-02-15
Attending: PHYSICIAN ASSISTANT
Payer: MEDICAID

## 2020-02-15 DIAGNOSIS — Z12.31 VISIT FOR SCREENING MAMMOGRAM: ICD-10-CM

## 2020-02-15 PROCEDURE — 77063 BREAST TOMOSYNTHESIS BI: CPT

## 2020-02-25 ENCOUNTER — HOSPITAL ENCOUNTER (OUTPATIENT)
Dept: ULTRASOUND IMAGING | Age: 65
Discharge: HOME | End: 2020-02-25
Attending: NURSE PRACTITIONER
Payer: MEDICAID

## 2020-02-25 DIAGNOSIS — B18.2 HEPATITIS C, CHRONIC (HCC): ICD-10-CM

## 2020-02-25 DIAGNOSIS — K76.0 NON-ALCOHOLIC FATTY LIVER DISEASE: ICD-10-CM

## 2020-02-25 DIAGNOSIS — E66.9 OBESITY: ICD-10-CM

## 2020-02-25 DIAGNOSIS — F17.210 CIGARETTE SMOKER: ICD-10-CM

## 2020-02-25 DIAGNOSIS — K74.00 HEPATIC FIBROSIS: ICD-10-CM

## 2020-02-25 DIAGNOSIS — R12 HEARTBURN: ICD-10-CM

## 2020-02-25 DIAGNOSIS — K74.60 CIRRHOSIS (HCC): ICD-10-CM

## 2020-02-25 DIAGNOSIS — R74.8 ABNORMAL LIVER ENZYMES: ICD-10-CM

## 2020-02-25 PROCEDURE — 76705 ECHO EXAM OF ABDOMEN: CPT

## 2020-06-14 ENCOUNTER — APPOINTMENT (OUTPATIENT)
Dept: GENERAL RADIOLOGY | Age: 65
End: 2020-06-14
Attending: PHYSICIAN ASSISTANT
Payer: MEDICAID

## 2020-06-14 ENCOUNTER — HOSPITAL ENCOUNTER (EMERGENCY)
Age: 65
Discharge: HOME | End: 2020-06-14
Attending: EMERGENCY MEDICINE
Payer: MEDICAID

## 2020-06-14 VITALS
HEART RATE: 102 BPM | SYSTOLIC BLOOD PRESSURE: 148 MMHG | DIASTOLIC BLOOD PRESSURE: 95 MMHG | RESPIRATION RATE: 14 BRPM | OXYGEN SATURATION: 98 % | TEMPERATURE: 98 F

## 2020-06-14 DIAGNOSIS — R05.9 COUGH: Primary | ICD-10-CM

## 2020-06-14 DIAGNOSIS — Z20.822 SUSPECTED COVID-19 VIRUS INFECTION: ICD-10-CM

## 2020-06-14 PROCEDURE — 99284 EMERGENCY DEPT VISIT MOD MDM: CPT

## 2020-06-14 PROCEDURE — 87635 SARS-COV-2 COVID-19 AMP PRB: CPT

## 2020-06-14 PROCEDURE — 71046 X-RAY EXAM CHEST 2 VIEWS: CPT

## 2020-06-14 NOTE — ED PROVIDER NOTES
EMERGENCY DEPARTMENT HISTORY AND PHYSICAL EXAM    3:04 PM      Date: 6/14/2020  Patient Name: Charla Severance    History of Presenting Illness     Chief Complaint   Patient presents with    Covid Testing         History Provided By: Patient    Additional History (Context): Charla Severance is a 60 y.o. female with noted PMH who presents with complaint of productive cough and anosmia x3 days. 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 Patient denies fever or chills, diaphoresis, dyspnea, chest pain, abdominal pain, nausea or vomiting. Did not take any medication for symptoms PTA. +smoker     PCP: PINA Peng    Current Outpatient Medications   Medication Sig Dispense Refill    cyclobenzaprine (FLEXERIL) 10 mg tablet Take 0.5 Tabs by mouth three (3) times daily as needed for Muscle Spasm(s). 60 Tab 1    glecaprevir-pibrentasvir (MAVYRET) 100-40 mg tab Take  by mouth.  olmesartan-hydroCHLOROthiazide (BENICAR HCT) 40-25 mg per tablet Take 1 Tab by mouth daily. Indications: high blood pressure 30 Tab 6    verapamil (CALAN) 120 mg tablet TAKE ONE TABLET BY MOUTH TWICE A DAY 60 Tab 6    conjugated estrogens (PREMARIN) 0.625 mg/gram vaginal cream Insert 0.5 g into vagina daily. Thin strip in vagina daily at HS for 3 weeks then stop one week and repeat 45 Each 3    albuterol (PROAIR HFA) 90 mcg/actuation inhaler Take 1-2 Puffs by inhalation every four (4) hours as needed for Wheezing. 1 Inhaler 6    fluticasone propion-salmeterol (ADVAIR) 100-50 mcg/dose diskus inhaler Take 1 Puff by inhalation every twelve (12) hours. 1 Inhaler 6    clobetasol (TEMOVATE) 0.05 % ointment Apply  to affected area two (2) times a day. 45 g 0    ergocalciferol (VITAMIN D2) 50,000 unit capsule TAKE ONE CAPSULE BY MOUTH EVERY 7 DAYS 4 Cap 11    magnesium oxide (MAG-OX) 400 mg tablet Take 1 Tab by mouth daily. 30 Tab 11    meloxicam (MOBIC) 15 mg tablet Take 1 Tab by mouth daily.  Indications: OSTEOARTHRITIS 30 Tab 1    dexlansoprazole (DEXILANT) 30 mg capsule Take 1 Cap by mouth daily as needed. Indications: GASTROESOPHAGEAL REFLUX 90 Cap 3    mometasone (NASONEX) 50 mcg/actuation nasal spray 2 Sprays by Both Nostrils route daily. 2 Container 0    travoprost (TRAVATAN Z) 0.004 % ophthalmic solution Administer 1 Drop to both eyes every evening. Indications: OPEN ANGLE GLAUCOMA 5 mL 3       Past History     Past Medical History:  Past Medical History:   Diagnosis Date    Abnormal liver enzymes 06/2018    Arthritis of knee, left 2007    Chronic obstructive pulmonary disease (HCC)     mild    Cirrhosis of liver ([facility] [address] 01/24/2018    Stage F2 fibrosis,compensated    Diverticulosis of sigmoid colon 10/2017    Mild diverticulosis    Erythrasma May 2014    beba feet, lower left leg    Glaucoma     H/O mammogram 03/01/2016    normal, f/u in 1 year    Heartburn 2018    Hepatitis C 2002    genotype 1a, previous IV drug user, declined interferons     Hypertension 2002    Hypomagnesemia 11/18/2015    Migraine 82/09/6972    Nonalcoholic fatty liver disease     Obesity 08/2018    Positive FIT (fecal immunochemical test) 07/26/2017    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 concern for Necrolytic acral erythema    Tobacco use     working with  [facility] smoking cessation        Past Surgical History:  Past Surgical History:   Procedure Laterality Date    COLONOSCOPY N/A 10/11/2017    COLONOSCOPY performed by Lauren Quinteros MD at [address] [place] [address] HX CHOLECYSTECTOMY  2006    laparoscopic    HX COLONOSCOPY  2012    hx of polyps, [place]    HX COLONOSCOPY  10/2017     Dr. Gurdeep Nguyen recommends your next colonoscopy in 5 years.     HX OTHER SURGICAL  2002    liver biopsy    HX PREMALIG/BENIGN SKIN LESION EXCISION  07/24/2019    right ear cyst removed    HX TUBAL LIGATION         Family History:  Family History   Problem Relation Age of Onset    Hypertension Mother     COPD Mother     Other Mother         Poly? nervous system    Cancer Father         rare form bone     Colon Polyps Sister     Colon Polyps Maternal Aunt        Social History:  Social History     Tobacco Use    Smoking status: Current Every Day Smoker     Packs/day: 0.50     Types: Cigarettes     Start date: 1/12/1974    Smokeless tobacco: Never Used   Substance Use Topics    Alcohol use: No    Drug use: Yes     Comment: IV crack cocaine, in remission since 2002       Allergies: Allergies   Allergen Reactions    Compazine [Prochlorperazine] Hives         Review of Systems       Review of Systems   Constitutional: Negative for chills and fever. Respiratory: Positive for cough. Negative for shortness of breath. Cardiovascular: Negative for chest pain. Gastrointestinal: Negative for abdominal pain, nausea and vomiting. Skin: Negative for rash. Neurological: Negative for weakness. All other systems reviewed and are negative. Physical Exam     Visit Vitals  BP (!) 146/96   Pulse (!) 102   Temp 98 °F (36.7 °C)   Resp 14   SpO2 98%         Physical Exam  Vitals signs and nursing note reviewed. Constitutional:       General: She is not in acute distress. Appearance: Normal appearance. She is well-developed. She is not ill-appearing, toxic-appearing or diaphoretic. HENT:      Head: Normocephalic and atraumatic. Nose: Nose normal. No congestion or rhinorrhea. Mouth/Throat:      Mouth: Mucous membranes are moist.   Eyes:      Pupils: Pupils are equal, round, and reactive to light. Neck:      Musculoskeletal: Normal range of motion and neck supple. No neck rigidity. Cardiovascular:      Rate and Rhythm: Normal rate and regular rhythm. Heart sounds: Normal heart sounds. No murmur. No friction rub. No gallop. Pulmonary:      Effort: Pulmonary effort is normal. No respiratory distress. Breath sounds: Normal breath sounds. No wheezing or rales. Musculoskeletal: Normal range of motion. Lymphadenopathy:      Cervical: No cervical adenopathy. Skin:     General: Skin is warm. Findings: No rash. Neurological:      Mental Status: She is alert. Diagnostic Study Results     Labs -  No results found for this or any previous visit (from the past 12 hour(s)). Radiologic Studies -   XR CHEST PA LAT    (Results Pending)   no acute process       Medical Decision Making   I am the first provider for this patient. I reviewed the vital signs, available nursing notes, past medical history, past surgical history, family history and social history. Vital Signs-Reviewed the patient's vital signs. Records Reviewed: Nursing Notes and Old Medical Records (Time of Review: 3:04 PM)    ED Course: Progress Notes, Reevaluation, and Consults:  3:04 PM  Reviewed results and plan with patient. Discussed need for close outpatient follow-up this week for reassessment. Discussed strict return precautions, including fever, chest pain, shortness of breath, or any other medical concerns. Pt ambulatory without distress. Provider Notes (Medical Decision Making): 80-year-old female who presents to the ED due to productive cough and anosmia x3 days. Afebrile, nontoxic-appearing, looks well. 98% on room air. Chest x-ray without acute process. COVID test sent and pending. Will recommend self quarantine x14 days, close outpatient follow-up, and strict return precautions for any new worsening or persistent symptoms. Diagnosis     Clinical Impression:   1. Cough    2.  Suspected COVID-19 virus infection        Disposition: home     Follow-up Information     Follow up With Specialties Details Why [address] EMERGENCY DEPT Emergency Medicine  If symptoms worsen [address] [place] [id number]  [address] [place] [address] Physician Assistant In 2 days  [address] [place] [address]  [address] [id number]  [phone] Patient's Medications   Start Taking    No medications on file   Continue Taking    ALBUTEROL (PROAIR HFA) 90 MCG/ACTUATION INHALER    Take 1-2 Puffs by inhalation every four (4) hours as needed for Wheezing. CLOBETASOL (TEMOVATE) 0.05 % OINTMENT    Apply  to affected area two (2) times a day. CONJUGATED ESTROGENS (PREMARIN) 0.625 MG/GRAM VAGINAL CREAM    Insert 0.5 g into vagina daily. Thin strip in vagina daily at HS for 3 weeks then stop one week and repeat    CYCLOBENZAPRINE (FLEXERIL) 10 MG TABLET    Take 0.5 Tabs by mouth three (3) times daily as needed for Muscle Spasm(s). DEXLANSOPRAZOLE (DEXILANT) 30 MG CAPSULE    Take 1 Cap by mouth daily as needed. Indications: GASTROESOPHAGEAL REFLUX    ERGOCALCIFEROL (VITAMIN D2) 50,000 UNIT CAPSULE    TAKE ONE CAPSULE BY MOUTH EVERY 7 DAYS    FLUTICASONE PROPION-SALMETEROL (ADVAIR) 100-50 MCG/DOSE DISKUS INHALER    Take 1 Puff by inhalation every twelve (12) hours. GLECAPREVIR-PIBRENTASVIR (MAVYRET) 100-40 MG TAB    Take  by mouth. MAGNESIUM OXIDE (MAG-OX) 400 MG TABLET    Take 1 Tab by mouth daily. MELOXICAM (MOBIC) 15 MG TABLET    Take 1 Tab by mouth daily. Indications: OSTEOARTHRITIS    MOMETASONE (NASONEX) 50 MCG/ACTUATION NASAL SPRAY    2 Sprays by Both Nostrils route daily. OLMESARTAN-HYDROCHLOROTHIAZIDE (BENICAR HCT) 40-25 MG PER TABLET    Take 1 Tab by mouth daily. Indications: high blood pressure    TRAVOPROST (TRAVATAN Z) 0.004 % OPHTHALMIC SOLUTION    Administer 1 Drop to both eyes every evening. Indications: OPEN ANGLE GLAUCOMA    VERAPAMIL (CALAN) 120 MG TABLET    TAKE ONE TABLET BY MOUTH TWICE A DAY   These Medications have changed    No medications on file   Stop Taking    No medications on file       Dictation disclaimer:  Please note that this dictation was completed with [email or web address], the [employer] voice recognition software.   Quite often unanticipated grammatical, syntax, homophones, and other interpretive errors are inadvertently transcribed by the computer software. Please disregard these errors. Please excuse any errors that have escaped final proofreading.

## 2020-06-14 NOTE — ED NOTES
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 Pt states she has no symptoms, denies SOB , fever, chills. Pt states she cannot smell but has allergies.

## 2020-06-14 NOTE — ED NOTES
5:25 PM  Received a call from radiologist, Dr. Ronn Brown. 1cm R upper lung nodule found on imaging from today. Called and spoke with patient. Discussed finding of lung nodule on chest x-ray. Discussed the importance of close outpatient follow-up with PMD this week for further imaging, including a CT scan of the chest.  Discussed that patient needs further imaging to rule out cancer. Patient verbalized understanding the nodule is concerning for malignancy and the importance of close outpatient follow-up. Notes she will call her primary care physician tomorrow. No further questions or concerns.

## 2020-06-14 NOTE — LETTER
[address] [place] [address] [facility] EMERGENCY DEPT 
[address] [facility] [address] [phone] Work/School Note Date: 6/14/2020 To Whom It May concern: 
 
Freddie Joseph was seen and treated today in the emergency room by the following provider(s): 
Attending Provider: Skye Aguilar MD 
Physician Assistant: PINA Diggs. Freddie Joseph may return to work on 6/28/20.  
 
Sincerely, 
 
 
 
 
PINA Sanchez

## 2020-06-14 NOTE — DISCHARGE INSTRUCTIONS
Patient Education   HOME ISOLATION PRECAUTIONS DURING COVID-19 OUTBREAK (per CDC guidelines)    1) Stay home except to get medical care:  People who are mildly ill with COVID-19 are able to isolate at home during their illness. You should restrict activities outside your home, except for getting medical care. Do not go to work, school, or public areas. Avoid using public transportation, ride-sharing, or taxis. 2) Separate yourself from other people and animals in your home  People: As much as possible, you should stay in a specific room and away from other people in your home. Also, you should use a separate bathroom, if available. 3) Animals: You should restrict contact with pets and other animals while you are sick with COVID-19, just like you would around other people. Although there have not been reports of pets or other animals becoming sick with COVID-19, it is still recommended that people sick with COVID-19 limit contact with animals until more information is known about the virus. When possible, have another member of your household care for your animals while you are sick. If you are sick with COVID-19, avoid contact with your pet, including petting, snuggling, being kissed or licked, and sharing food. If you must care for your pet or be around animals while you are sick, wash your hands before and after you interact with pets and wear a facemask. See COVID-19 and Animals for more information. 4) Call ahead before visiting your doctor  If you have a medical appointment, call the healthcare provider and tell them that you have or may have COVID-19. This will help the healthcare providers office take steps to keep other people from getting infected or exposed. 5) Wear a facemask  You should wear a facemask when you are around other people (e.g., sharing a room or vehicle) or pets and before you enter a healthcare providers office.  If you are not able to wear a facemask (for example, because it causes trouble breathing), then people who live with you should not stay in the same room with you, or they should wear a facemask if they enter your room. 6) Cover your coughs and sneezes  Cover your mouth and nose with a tissue when you cough or sneeze. Throw used tissues in a lined trash can. Immediately wash your hands with soap and water for at least 20 seconds or, if soap and water are not available, clean your hands with an alcohol-based hand  that contains at least 60% alcohol. 7) Clean your hands often  Wash your hands often with soap and water for at least 20 seconds, especially after blowing your nose, coughing, or sneezing; going to the bathroom; and before eating or preparing food. If soap and water are not readily available, use an alcohol-based hand  with at least 60% alcohol, covering all surfaces of your hands and rubbing them together until they feel dry. 8) Soap and water are the best option if hands are visibly dirty. Avoid touching your eyes, nose, and mouth with unwashed hands. 9) Avoid sharing personal household items  You should not share dishes, drinking glasses, cups, eating utensils, towels, or bedding with other people or pets in your home. After using these items, they should be washed thoroughly with soap and water. 10) Clean all high-touch surfaces everyday  High touch surfaces include counters, tabletops, doorknobs, bathroom fixtures, toilets, phones, keyboards, tablets, and bedside tables. Also, clean any surfaces that may have blood, stool, or body fluids on them. Use a household cleaning spray or wipe, according to the label instructions. Labels contain instructions for safe and effective use of the cleaning product including precautions you should take when applying the product, such as wearing gloves and making sure you have good ventilation during use of the product.     11) Monitor your symptoms  Seek prompt medical attention if your illness is worsening (e.g., difficulty breathing). Before seeking care, call your healthcare provider and tell them that you have, or are being evaluated for, COVID-19. Put on a facemask before you enter the facility. These steps will help the healthcare providers office to keep other people in the office or waiting room from getting infected or exposed. Ask your healthcare provider to call the local or state health department. Persons who are placed under active monitoring or facilitated self-monitoring should follow instructions provided by their local health department or occupational health professionals, as appropriate. When working with your local health department check their available hours. 12) If you have a medical emergency and need to call 911, notify the dispatch personnel that you have, or are being evaluated for COVID-19. If possible, put on a facemask before emergency medical services arrive. 13) Discontinuing home isolation  Patients with confirmed COVID-19 should remain under home isolation precautions until the risk of secondary transmission to others is thought to be low. The decision to discontinue home isolation precautions should be made on a case-by-case basis, in consultation with healthcare providers and Replaced by [facility] and local health departments. Recommended precautions for household members, intimate partners, and caregivers in a nonhealthcare setting of  A patient with symptomatic laboratory-confirmed COVID-19   or   a patient under investigation  Household members, intimate partners, and caregivers in a nonhealthcare setting may have close contact2 with a person with symptomatic, laboratory-confirmed COVID-19 or a person under investigation.  Close contacts should monitor their health; they should call their healthcare provider right away if they develop symptoms suggestive of COVID-19 (e.g., fever, cough, shortness of breath)     Close contacts should also follow these recommendations:   Make sure that you understand and can help the patient follow their healthcare provider's instructions for medication(s) and care. You should help the patient with basic needs in the home and provide support for getting groceries, prescriptions, and other personal needs.  Monitor the patient's symptoms. If the patient is getting sicker, call his or her healthcare provider and tell them that the patient has laboratory-confirmed COVID-19. This will help the healthcare provider's office take steps to keep other people in the office or waiting room from getting infected. Ask the healthcare provider to call the local or [facility] health department for additional guidance. If the patient has a medical emergency and you need to call 911, notify the dispatch personnel that the patient has, or is being evaluated for COVID-19.   Household members should stay in another room or be  from the patient as much as possible. Household members should use a separate bedroom and bathroom, if available.  Prohibit visitors who do not have an essential need to be in the home.  Make sure that shared spaces in the home have good air flow, such as by an air conditioner or an opened window, weather permitting.  Perform hand hygiene frequently. Wash your hands often with soap and water for at least 20 seconds or use an alcohol-based hand  that contains 60 to 95% alcohol, covering all surfaces of your hands and rubbing them together until they feel dry. Soap and water should be used preferentially if hands are visibly dirty.  You and the patient should wear a facemask if you are in the same room.  Wear a disposable facemask and gloves when you touch or have contact with the patient's blood, stool, or body fluids, such as saliva, sputum, nasal mucus, vomit, urine. o Throw out disposable facemasks and gloves after using them. Do not reuse. o When removing personal protective equipment, first remove and dispose of gloves. Then, immediately clean your hands with soap and water or alcohol-based hand . Next, remove and dispose of facemask, and immediately clean your hands again with soap and water or alcohol-based hand . [address] thoroughly.  o Immediately remove and wash clothes or bedding that have blood, stool, or body fluids on them.  o Wear disposable gloves while handling soiled items and keep soiled items away from your body. Clean your hands (with soap and water or an alcohol-based hand ) immediately after removing your gloves. o Read and follow directions on labels of laundry or clothing items and detergent. In general, using a normal laundry detergent according to washing machine instructions and dry thoroughly using the warmest temperatures recommended on the clothing label.  Discuss any additional questions with your state or local health department or healthcare provider. Footnotes  2Close contact is defined as--  a) being within approximately 6 feet (2 meters) of a COVID-19 case for a prolonged period of time; close contact can occur while caring for, living with, visiting, or sharing a health care waiting area or room with a COVID-19 case  - or -  b) having direct contact with infectious secretions of a COVID-19 case (e.g., being coughed on). Learning About Coronavirus [phone])  Coronavirus [phone]): Overview  What is coronavirus (COVID-19)? The coronavirus disease (COVID-19) is caused by a virus. It is an illness that was first found in [place], [place], in December 2019. It has since spread worldwide. The virus can cause fever, cough, and trouble breathing. In severe cases, it can cause pneumonia and make it hard to breathe without help. It can cause death. Coronaviruses are a large group of viruses. They cause the common cold. They also cause more serious illnesses like Middle East respiratory syndrome (MERS) and severe acute respiratory syndrome (SARS). COVID-19 is caused by a novel coronavirus. That means it's a new type that has not been seen in people before. This virus spreads person-to-person through droplets from coughing and sneezing. It can also spread when you are close to someone who is infected. And it can spread when you touch something that has the virus on it, such as a doorknob or a tabletop. What can you do to protect yourself from coronavirus (COVID-19)? The best way to protect yourself from getting sick is to:  · Avoid areas where there is an outbreak. · Avoid contact with people who may be infected. · Wash your hands often with soap or alcohol-based hand sanitizers. · Avoid crowds and try to stay at least 6 feet away from other people. · Wash your hands often, especially after you cough or sneeze. Use soap and water, and scrub for at least 20 seconds. If soap and water aren't available, use an alcohol-based hand . · Avoid touching your mouth, nose, and eyes. What can you do to avoid spreading the virus to others? To help avoid spreading the virus to others:  · Cover your mouth with a tissue when you cough or sneeze. Then throw the tissue in the trash. · Use a disinfectant to clean things that you touch often. · Wear a cloth face cover if you have to go to public areas. · Stay home if you are sick or have been exposed to the virus. Don't go to school, work, or public areas. And don't use public transportation, ride-shares, or taxis unless you have no choice. · If you are sick:  ? Leave your home only if you need to get medical care. But call the doctor's office first so they know you're coming. And wear a face cover. ? Wear the face cover whenever you're around other people. It can help stop the spread of the virus when you cough or sneeze. ? Clean and disinfect your home every day. Use household  and disinfectant wipes or sprays. Take special care to clean things that you grab with your hands.  These include doorknobs, remote controls, phones, and handles on your refrigerator and microwave. And don't forget countertops, tabletops, bathrooms, and computer keyboards. When to call for help  [id number] anytime you think you may need emergency care. For example, call if:  · You have severe trouble breathing. (You can't talk at all.)  · You have constant chest pain or pressure. · You are severely dizzy or lightheaded. · You are confused or can't think clearly. · Your face and lips have a blue color. · You pass out (lose consciousness) or are very hard to wake up. Call your doctor now if you develop symptoms such as:  · Shortness of breath. · Fever. · Cough. If you need to get care, call ahead to the doctor's office for instructions before you go. Make sure you wear a face cover to prevent exposing other people to the virus. Where can you get the latest information? The following health organizations are tracking and studying this virus. Their websites contain the most up-to-date information. Swetha Hugo also learn what to do if you think you may have been exposed to the virus. · U.[place]. Centers for Disease Control and Prevention (CDC): The CDC provides updated news about the disease and travel advice. The website also tells you how to prevent the spread of infection. www.cdc.gov  · World Health Organization [facility]): WHO offers information about the virus outbreaks. WHO also has travel advice. www.who.int  Current as of: May 8, 2020               Content Version: 12.5  © 2006-2020 Healthwise[employer]. Care instructions adapted under license by [employer] (which disclaims liability or warranty for this information). If you have questions about a medical condition or this instruction, always ask your healthcare professional. [place] [address] any warranty or liability for your use of this information.          Patient Education        Cough: Care Instructions  Your Care Instructions     A cough is your body's response to something that bothers your throat or airways. Many things can cause a cough. You might cough because of a cold or the flu, bronchitis, or asthma. Smoking, postnasal drip, allergies, and stomach acid that backs up into your throat also can cause coughs. A cough is a symptom, not a disease. Most coughs stop when the cause, such as a cold, goes away. You can take a few steps at home to cough less and feel better. Follow-up care is a key part of your treatment and safety. Be sure to make and go to all appointments, and call your doctor if you are having problems. It's also a good idea to know your test results and keep a list of the medicines you take. How can you care for yourself at home? · Drink lots of water and other fluids. This helps thin the mucus and soothes a dry or sore throat. Honey or lemon juice in hot water or tea may ease a dry cough. · Take cough medicine as directed by your doctor. · Prop up your head on pillows to help you breathe and ease a dry cough. · Try cough drops to soothe a dry or sore throat. Cough drops don't stop a cough. Medicine-flavored cough drops are no better than candy-flavored drops or hard candy. · Do not smoke. Avoid secondhand smoke. If you need help quitting, talk to your doctor about stop-smoking programs and medicines. These can increase your chances of quitting for good. When should you call for help? [id number] anytime you think you may need emergency care. For example, call if:  · You have severe trouble breathing. Call your doctor now or seek immediate medical care if:  · You cough up blood. · You have new or worse trouble breathing. · You have a new or higher fever. · You have a new rash. Watch closely for changes in your health, and be sure to contact your doctor if:  · You cough more deeply or more often, especially if you notice more mucus or a change in the color of your mucus.   · You have new symptoms, such as a sore throat, an earache, or sinus pain. · You do not get better as expected. Where can you learn more? Go to http://drew-tarun.info/  Enter D279 in the search box to learn more about \"Cough: Care Instructions. \"  Current as of: February 24, 2020               Content Version: 12.5  © 0449-5702 Healthwise, Incorporated. Care instructions adapted under license by [employer] (which disclaims liability or warranty for this information). If you have questions about a medical condition or this instruction, always ask your healthcare professional. [address] any warranty or liability for your use of this information.

## 2020-06-15 ENCOUNTER — TELEPHONE (OUTPATIENT)
Dept: CASE MANAGEMENT | Age: 65
End: 2020-06-15

## 2020-06-15 NOTE — TELEPHONE ENCOUNTER
Patient contacted regarding recent discharge and COVID-19 risk   Care Coordinator contacted the patient by telephone to perform post discharge assessment. Verified name and  with patient as identifiers. Patient has following risk factors of: exposure to covid positive patient. Care Coordinator reviewed discharge instructions, medical action plan and red flags related to discharge diagnosis. Reviewed and educated them on any new and changed medications related to discharge diagnosis. Advised obtaining a 90-day supply of all daily and as-needed medications. Education provided regarding infection prevention, and signs and symptoms of COVID-19 and when to seek medical attention with patient who verbalized understanding. Discussed exposure protocols and quarantine from [address] you at higher risk for severe illness  and given an opportunity for questions and concerns. The patient agrees to contact the COVID-19 hotline [phone] or PCP office for questions related to their healthcare. Care Coordinator provided contact information for future reference. From CDC: Are you at higher risk for severe illness?  Wash your hands often.  Avoid close contact (6 feet, which is about two arm lengths) with people who are sick.  Put distance between yourself and other people if COVID-19 is spreading in your community.  Clean and disinfect frequently touched surfaces.  Avoid all cruise travel and non-essential air travel.  Call your healthcare professional if you have concerns about COVID-19 and your underlying condition or if you are sick.     For more information on steps you can take to protect yourself, see CDC's How to Protect Yourself      Patient/family/caregiver given information for [facility] and agrees to enroll no  Patient's preferred e-mail:    Patient's preferred phone number:   Based on Loop alert triggers, patient will be contacted by nurse care manager for worsening symptoms. Plan for follow-up call in 7-14 days based on severity of symptoms and risk factors.     Patient states she will call her PCP today for follow up

## 2020-06-17 LAB — SARS-COV-2, COV2NT: DETECTED

## 2020-06-17 NOTE — PROGRESS NOTES
I called the patient and updated her about the results. She is reporting improvement in her symptoms. I provided her with return precautions and general instructions. Her questions were answered and she agrees with the plan.

## 2020-06-23 ENCOUNTER — HOSPITAL ENCOUNTER (EMERGENCY)
Age: 65
Discharge: HOME | End: 2020-06-23
Attending: EMERGENCY MEDICINE
Payer: MEDICAID

## 2020-06-23 ENCOUNTER — APPOINTMENT (OUTPATIENT)
Dept: GENERAL RADIOLOGY | Age: 65
End: 2020-06-23
Attending: EMERGENCY MEDICINE
Payer: MEDICAID

## 2020-06-23 VITALS
WEIGHT: 230 LBS | TEMPERATURE: 98.8 F | HEIGHT: 65 IN | OXYGEN SATURATION: 99 % | RESPIRATION RATE: 22 BRPM | BODY MASS INDEX: 38.32 KG/M2 | SYSTOLIC BLOOD PRESSURE: 96 MMHG | DIASTOLIC BLOOD PRESSURE: 51 MMHG | HEART RATE: 79 BPM

## 2020-06-23 DIAGNOSIS — U07.1 COVID-19 VIRUS DETECTED: ICD-10-CM

## 2020-06-23 DIAGNOSIS — E86.0 DEHYDRATION: Primary | ICD-10-CM

## 2020-06-23 DIAGNOSIS — R19.7 DIARRHEA, UNSPECIFIED TYPE: ICD-10-CM

## 2020-06-23 LAB
ALBUMIN SERPL-MCNC: 3.2 G/DL (ref 3.4–5)
ALBUMIN/GLOB SERPL: 0.7 {RATIO} (ref 0.8–1.7)
ALP SERPL-CCNC: 65 U/L (ref 45–117)
ALT SERPL-CCNC: 56 U/L (ref 13–56)
ANION GAP SERPL CALC-SCNC: 9 MMOL/L (ref 3–18)
APPEARANCE UR: ABNORMAL
AST SERPL-CCNC: 53 U/L (ref 10–38)
ATRIAL RATE: 86 BPM
BACTERIA URNS QL MICRO: ABNORMAL /HPF
BASOPHILS # BLD: 0 K/UL (ref 0–0.1)
BASOPHILS NFR BLD: 0 % (ref 0–2)
BILIRUB SERPL-MCNC: 0.9 MG/DL (ref 0.2–1)
BILIRUB UR QL: ABNORMAL
BUN SERPL-MCNC: 53 MG/DL (ref 7–18)
BUN/CREAT SERPL: 22 (ref 12–20)
CALCIUM SERPL-MCNC: 8.3 MG/DL (ref 8.5–10.1)
CALCULATED P AXIS, ECG09: 47 DEGREES
CALCULATED R AXIS, ECG10: 6 DEGREES
CALCULATED T AXIS, ECG11: 33 DEGREES
CHLORIDE SERPL-SCNC: 107 MMOL/L (ref 100–111)
CO2 SERPL-SCNC: 19 MMOL/L (ref 21–32)
COLOR UR: ABNORMAL
CREAT SERPL-MCNC: 2.45 MG/DL (ref 0.6–1.3)
DIAGNOSIS, 93000: NORMAL
DIFFERENTIAL METHOD BLD: ABNORMAL
EOSINOPHIL # BLD: 0 K/UL (ref 0–0.4)
EOSINOPHIL NFR BLD: 1 % (ref 0–5)
EPITH CASTS URNS QL MICRO: ABNORMAL /LPF (ref 0–5)
ERYTHROCYTE [DISTWIDTH] IN BLOOD BY AUTOMATED COUNT: 12.3 % (ref 11.6–14.5)
GLOBULIN SER CALC-MCNC: 4.4 G/DL (ref 2–4)
GLUCOSE SERPL-MCNC: 103 MG/DL (ref 74–99)
GLUCOSE UR STRIP.AUTO-MCNC: NEGATIVE MG/DL
HCT VFR BLD AUTO: 32.7 % (ref 35–45)
HGB BLD-MCNC: 11.5 G/DL (ref 12–16)
HGB UR QL STRIP: NEGATIVE
KETONES UR QL STRIP.AUTO: ABNORMAL MG/DL
LEUKOCYTE ESTERASE UR QL STRIP.AUTO: ABNORMAL
LYMPHOCYTES # BLD: 1.2 K/UL (ref 0.9–3.6)
LYMPHOCYTES NFR BLD: 34 % (ref 21–52)
MCH RBC QN AUTO: 30.8 PG (ref 24–34)
MCHC RBC AUTO-ENTMCNC: 35.2 G/DL (ref 31–37)
MCV RBC AUTO: 87.7 FL (ref 74–97)
MONOCYTES # BLD: 0.6 K/UL (ref 0.05–1.2)
MONOCYTES NFR BLD: 15 % (ref 3–10)
NEUTS SEG # BLD: 1.8 K/UL (ref 1.8–8)
NEUTS SEG NFR BLD: 50 % (ref 40–73)
NITRITE UR QL STRIP.AUTO: NEGATIVE
P-R INTERVAL, ECG05: 164 MS
PH UR STRIP: 5 [PH] (ref 5–8)
PLATELET # BLD AUTO: 162 K/UL (ref 135–420)
PMV BLD AUTO: 10.8 FL (ref 9.2–11.8)
POTASSIUM SERPL-SCNC: 4.1 MMOL/L (ref 3.5–5.5)
PROT SERPL-MCNC: 7.6 G/DL (ref 6.4–8.2)
PROT UR STRIP-MCNC: 30 MG/DL
Q-T INTERVAL, ECG07: 354 MS
QRS DURATION, ECG06: 88 MS
QTC CALCULATION (BEZET), ECG08: 423 MS
RBC # BLD AUTO: 3.73 M/UL (ref 4.2–5.3)
RBC #/AREA URNS HPF: NEGATIVE /HPF (ref 0–5)
SODIUM SERPL-SCNC: 135 MMOL/L (ref 136–145)
SP GR UR REFRACTOMETRY: 1.02 (ref 1–1.03)
TROPONIN I SERPL-MCNC: 0.02 NG/ML (ref 0–0.04)
UROBILINOGEN UR QL STRIP.AUTO: 4 EU/DL (ref 0.2–1)
VENTRICULAR RATE, ECG03: 86 BPM
WBC # BLD AUTO: 3.7 K/UL (ref 4.6–13.2)
WBC URNS QL MICRO: ABNORMAL /HPF (ref 0–4)
YEAST URNS QL MICRO: ABNORMAL

## 2020-06-23 PROCEDURE — 85025 COMPLETE CBC W/AUTO DIFF WBC: CPT

## 2020-06-23 PROCEDURE — 96361 HYDRATE IV INFUSION ADD-ON: CPT

## 2020-06-23 PROCEDURE — 80053 COMPREHEN METABOLIC PANEL: CPT

## 2020-06-23 PROCEDURE — 84484 ASSAY OF TROPONIN QUANT: CPT

## 2020-06-23 PROCEDURE — 99285 EMERGENCY DEPT VISIT HI MDM: CPT

## 2020-06-23 PROCEDURE — 93005 ELECTROCARDIOGRAM TRACING: CPT

## 2020-06-23 PROCEDURE — 74011250636 HC RX REV CODE- 250/636: Performed by: EMERGENCY MEDICINE

## 2020-06-23 PROCEDURE — 81001 URINALYSIS AUTO W/SCOPE: CPT

## 2020-06-23 PROCEDURE — 96374 THER/PROPH/DIAG INJ IV PUSH: CPT

## 2020-06-23 PROCEDURE — 71045 X-RAY EXAM CHEST 1 VIEW: CPT

## 2020-06-23 RX ORDER — ONDANSETRON 8 MG/1
8 TABLET, ORALLY DISINTEGRATING ORAL
Qty: 14 TAB | Refills: 0 | Status: SHIPPED | OUTPATIENT
Start: 2020-06-23 | End: 2021-05-06

## 2020-06-23 RX ORDER — ONDANSETRON 2 MG/ML
4 INJECTION INTRAMUSCULAR; INTRAVENOUS
Status: COMPLETED | OUTPATIENT
Start: 2020-06-23 | End: 2020-06-23

## 2020-06-23 RX ADMIN — ONDANSETRON 4 MG: 2 INJECTION INTRAMUSCULAR; INTRAVENOUS at 14:43

## 2020-06-23 RX ADMIN — SODIUM CHLORIDE 1000 ML: 9 INJECTION, SOLUTION INTRAVENOUS at 10:57

## 2020-06-23 NOTE — DISCHARGE INSTRUCTIONS
Patient Education        Learning About COVID-19 and Social Distancing  What is it? Social distancing means putting space between yourself and other people. The recommended distance is 6 feet, or about 2 meters. This also means staying away from any place where people may gather, such as shea or other public gathering places. Why is it important? Social distancing is the best way to reduce the spread of COVID-19. This virus seems to spread from person to person through droplets from coughing and sneezing. So if you keep your distance from others, you're less likely to get it or spread it. And social distancing is important for everyone, not just those who are at high risk of infection, like older people. You might have the virus but not have symptoms. You could then give the infection to someone you come into contact with. How is it done? Putting 6 feet, or about 2 meters, between you and other people is the recommended distance. Also stay away from any place where people may gather, such as shea or other public gathering places. So if possible:  · Work from home, and keep your kids at home. · Don't travel if you don't have to. And avoid public transportation, ride-shares, and taxis unless you have no choice. · Limit shopping to essentials, like food and medicines. · Wear a cloth face cover if you have to go to a public place like the grocery store or pharmacy. · Don't eat in restaurants. (You can still get takeout or food deliveries.)  · Avoid crowds and busy places. Follow stay-at-home orders or other directions for your area. Current as of: May 8, 2020               Content Version: 12.5  © 9478-5517 Healthwise, Incorporated. Care instructions adapted under license by [employer] (which disclaims liability or warranty for this information).  If you have questions about a medical condition or this instruction, always ask your healthcare professional. [employer] any warranty or liability for your use of this information. Patient Education        Diarrhea: Care Instructions  Your Care Instructions        Diarrhea is loose, watery stools (bowel movements). The exact cause is often hard to find. Sometimes diarrhea is your body's way of getting rid of what caused an upset stomach. Viruses, food poisoning, and many medicines can cause diarrhea. Some people get diarrhea in response to emotional stress, anxiety, or certain foods. Almost everyone has diarrhea now and then. It usually isn't serious, and your stools will return to normal soon. The important thing to do is replace the fluids you have lost, so you can prevent dehydration. The doctor has checked you carefully, but problems can develop later. If you notice any problems or new symptoms, get medical treatment right away. Follow-up care is a key part of your treatment and safety. Be sure to make and go to all appointments, and call your doctor if you are having problems. It's also a good idea to know your test results and keep a list of the medicines you take. How can you care for yourself at home? · Watch for signs of dehydration, which means your body has lost too much water. Dehydration is a serious condition and should be treated right away. Signs of dehydration are:  ? Increasing thirst and dry eyes and mouth. ? Feeling faint or lightheaded. ? A smaller amount of urine than normal.  · To prevent dehydration, drink plenty of fluids. Choose water and other caffeine-free clear liquids until you feel better. If you have kidney, heart, or liver disease and have to limit fluids, talk with your doctor before you increase the amount of fluids you drink. · Begin eating small amounts of mild foods the next day, if you feel like it. ? Try yogurt that has live cultures of Lactobacillus. (Check the label.)  ? Avoid spicy foods, fruits, alcohol, and caffeine until 48 hours after all symptoms are gone. ?  Avoid chewing gum that contains sorbitol. ? Avoid dairy products (except for yogurt with Lactobacillus) while you have diarrhea and for 3 days after symptoms are gone. · The doctor may recommend that you take over-the-counter medicine, such as loperamide (Imodium), if you still have diarrhea after 6 hours. Read and follow all instructions on the label. Do not use this medicine if you have bloody diarrhea, a high fever, or other signs of serious illness. Call your doctor if you think you are having a problem with your medicine. When should you call for help? [id number] anytime you think you may need emergency care. For example, call if:  · You passed out (lost consciousness). · Your stools are maroon or very bloody. Call your doctor now or seek immediate medical care if:  · You are dizzy or lightheaded, or you feel like you may faint. · Your stools are black and look like tar, or they have streaks of blood. · You have new or worse belly pain. · You have symptoms of dehydration, such as:  ? Dry eyes and a dry mouth. ? Passing only a little dark urine. ? Feeling thirstier than usual.  · You have a new or higher fever. Watch closely for changes in your health, and be sure to contact your doctor if:  · Your diarrhea is getting worse. · You see pus in the diarrhea. · You are not getting better after 2 days (48 hours). Where can you learn more? Go to http://drew-tarun.info/  Enter [id number] in the search box to learn more about \"Diarrhea: Care Instructions. \"  Current as of: June 26, 2019               Content Version: 12.5  © 3431-5702 Healthwise, Incorporated. Care instructions adapted under license by [employer] (which disclaims liability or warranty for this information). If you have questions about a medical condition or this instruction, always ask your healthcare professional. [address] any warranty or liability for your use of this information.

## 2020-06-23 NOTE — ED PROVIDER NOTES
EMERGENCY DEPARTMENT HISTORY AND PHYSICAL EXAM    Date: 6/23/2020  Patient Name: Linette Melendez    History of Presenting Illness     Chief Complaint   Patient presents with    Dehydration    Fatigue         History Provided By: Patient  Additional History (Context): Linette Melendez is a 60 y.o. female with COPD and IVDU, hepatitis C, REVELES who presents with complaint of feeling dehydrated and fatigued. Seen here earlier this month and diagnosed with a positive COVID test result. She is still having persistent diarrhea nonbloody multiple times a day. She is vomited twice. Denies fever cough or shortness of breath. She talk to her PCP and was advised to come to the emergency department for IV fluids. Denies any abdominal pain. PCP: Jimena Esparza MD    Current Outpatient Medications   Medication Sig Dispense Refill    ondansetron (Zofran ODT) 8 mg disintegrating tablet Take 1 Tab by mouth every eight (8) hours as needed for Nausea or Vomiting. 14 Tab 0    cyclobenzaprine (FLEXERIL) 10 mg tablet Take 0.5 Tabs by mouth three (3) times daily as needed for Muscle Spasm(s). 60 Tab 1    glecaprevir-pibrentasvir (MAVYRET) 100-40 mg tab Take  by mouth.  olmesartan-hydroCHLOROthiazide (BENICAR HCT) 40-25 mg per tablet Take 1 Tab by mouth daily. Indications: high blood pressure 30 Tab 6    verapamil (CALAN) 120 mg tablet TAKE ONE TABLET BY MOUTH TWICE A DAY 60 Tab 6    conjugated estrogens (PREMARIN) 0.625 mg/gram vaginal cream Insert 0.5 g into vagina daily. Thin strip in vagina daily at HS for 3 weeks then stop one week and repeat 45 Each 3    albuterol (PROAIR HFA) 90 mcg/actuation inhaler Take 1-2 Puffs by inhalation every four (4) hours as needed for Wheezing. 1 Inhaler 6    fluticasone propion-salmeterol (ADVAIR) 100-50 mcg/dose diskus inhaler Take 1 Puff by inhalation every twelve (12) hours. 1 Inhaler 6    clobetasol (TEMOVATE) 0.05 % ointment Apply  to affected area two (2) times a day.  39 g 0    ergocalciferol (VITAMIN D2) 50,000 unit capsule TAKE ONE CAPSULE BY MOUTH EVERY 7 DAYS 4 Cap 11    magnesium oxide (MAG-OX) 400 mg tablet Take 1 Tab by mouth daily. 30 Tab 11    meloxicam (MOBIC) 15 mg tablet Take 1 Tab by mouth daily. Indications: OSTEOARTHRITIS 30 Tab 1    dexlansoprazole (DEXILANT) 30 mg capsule Take 1 Cap by mouth daily as needed. Indications: GASTROESOPHAGEAL REFLUX 90 Cap 3    mometasone (NASONEX) 50 mcg/actuation nasal spray 2 Sprays by Both Nostrils route daily. 2 Container 0    travoprost (TRAVATAN Z) 0.004 % ophthalmic solution Administer 1 Drop to both eyes every evening. Indications: OPEN ANGLE GLAUCOMA 5 mL 3       Past History     Past Medical History:  Past Medical History:   Diagnosis Date    Abnormal liver enzymes 06/2018    Arthritis of knee, left 2007    Chronic obstructive pulmonary disease (HCC)     mild    Cirrhosis of liver ([facility] [address] 01/24/2018    Stage F2 fibrosis,compensated    Diverticulosis of sigmoid colon 10/2017    Mild diverticulosis    Erythrasma May 2014    beba feet, lower left leg    Glaucoma     H/O mammogram 03/01/2016    normal, f/u in 1 year    Heartburn 2018    Hepatitis C 2002    genotype 1a, previous IV drug user, declined interferons     Hypertension 2002    Hypomagnesemia 11/18/2015    Migraine 36/89/2604    Nonalcoholic fatty liver disease     Obesity 08/2018    Positive FIT (fecal immunochemical test) 07/26/2017    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 Alize Lin recommends your next colonoscopy in 5 years.  HX OTHER SURGICAL  2002    liver biopsy    HX PREMALIG/BENIGN SKIN LESION EXCISION  07/24/2019    right ear cyst removed    HX TUBAL LIGATION         Family History:  Family History   Problem Relation Age of Onset    Hypertension Mother     COPD Mother     Other Mother         Poly? nervous system    Cancer Father         rare form bone     Colon Polyps Sister     Colon Polyps Maternal Aunt        Social History:  Social History     Tobacco Use    Smoking status: Current Every Day Smoker     Packs/day: 0.50     Types: Cigarettes     Start date: 1/12/1974    Smokeless tobacco: Never Used   Substance Use Topics    Alcohol use: No    Drug use: Yes     Comment: IV crack cocaine, in remission since 2002       Allergies: Allergies   Allergen Reactions    Compazine [Prochlorperazine] Hives         Review of Systems   Review of Systems   Constitutional: Positive for fatigue. Negative for fever. Respiratory: Negative for cough and shortness of breath. Cardiovascular: Negative for chest pain. Gastrointestinal: Positive for diarrhea, nausea and vomiting. Negative for abdominal pain and blood in stool. Genitourinary: Negative for flank pain. All Other Systems Negative  Physical Exam     Vitals:    06/23/20 1330 06/23/20 1400 06/23/20 1415 06/23/20 1430   BP: 99/54 98/50 98/53 96/51   Pulse: 84 84 81 79   Resp: 22 18 17 22   Temp:       SpO2: 97% 95% 98% 99%   Weight:       Height:         Physical Exam  Vitals signs and nursing note reviewed. Constitutional:       Appearance: She is well-developed. She is ill-appearing. HENT:      Head: Normocephalic and atraumatic. Eyes:      Pupils: Pupils are equal, round, and reactive to light. Neck:      Thyroid: No thyromegaly. Vascular: No JVD. Trachea: No tracheal deviation. Cardiovascular:      Rate and Rhythm: Normal rate and regular rhythm. Heart sounds: Normal heart sounds. No murmur. No friction rub.  No gallop. Pulmonary:      Effort: Pulmonary effort is normal. No respiratory distress. Breath sounds: Normal breath sounds. No stridor. No wheezing or rales. Chest:      Chest wall: No tenderness. Abdominal:      General: There is no distension. Palpations: Abdomen is soft. There is no mass. Tenderness: There is no abdominal tenderness. There is no guarding or rebound. Musculoskeletal:         General: No tenderness. Lymphadenopathy:      Cervical: No cervical adenopathy. Skin:     General: Skin is warm and dry. Coloration: Skin is not pale. Findings: No erythema or rash. Neurological:      Mental Status: She is alert and oriented to person, place, and time. Psychiatric:         Behavior: Behavior normal.         Thought Content: Thought content normal.            Diagnostic Study Results     Labs -     Recent Results (from the past 12 hour(s))   EKG, 12 LEAD, INITIAL    Collection Time: 06/23/20 10:44 AM   Result Value Ref Range    Ventricular Rate 86 BPM    Atrial Rate 86 BPM    P-R Interval 164 ms    QRS Duration 88 ms    Q-T Interval 354 ms    QTC Calculation (Bezet) 423 ms    Calculated P Axis 47 degrees    Calculated R Axis 6 degrees    Calculated T Axis 33 degrees    Diagnosis       Normal sinus rhythm  Minimal voltage criteria for LVH, may be normal variant  Borderline ECG     CBC WITH AUTOMATED DIFF    Collection Time: 06/23/20 10:45 AM   Result Value Ref Range    WBC 3.7 (L) 4.6 - 13.2 K/uL    RBC 3.73 (L) 4.20 - 5.30 M/uL    HGB 11.5 (L) 12.0 - 16.0 g/dL    HCT 32.7 (L) 35.0 - 45.0 %    MCV 87.7 74.0 - 97.0 FL    MCH 30.8 24.0 - 34.0 PG    MCHC 35.2 31.0 - 37.0 g/dL    RDW 12.3 11.6 - 14.5 %    PLATELET 054 193 - 747 K/uL    MPV 10.8 9.2 - 11.8 FL    NEUTROPHILS 50 40 - 73 %    LYMPHOCYTES 34 21 - 52 %    MONOCYTES 15 (H) 3 - 10 %    EOSINOPHILS 1 0 - 5 %    BASOPHILS 0 0 - 2 %    ABS. NEUTROPHILS 1.8 1.8 - 8.0 K/UL    ABS. LYMPHOCYTES 1.2 0.9 - 3.6 K/UL    ABS. MONOCYTES 0.6 0.05 - 1.2 K/UL    ABS. EOSINOPHILS 0.0 0.0 - 0.4 K/UL    ABS. BASOPHILS 0.0 0.0 - 0.1 K/UL    DF AUTOMATED     METABOLIC PANEL, COMPREHENSIVE    Collection Time: 06/23/20 10:45 AM   Result Value Ref Range    Sodium 135 (L) 136 - 145 mmol/L    Potassium 4.1 3.5 - 5.5 mmol/L    Chloride 107 100 - 111 mmol/L    CO2 19 (L) 21 - 32 mmol/L    Anion gap 9 3.0 - 18 mmol/L    Glucose 103 (H) 74 - 99 mg/dL    BUN 53 (H) 7.0 - 18 MG/DL    Creatinine 2.45 (H) 0.6 - 1.3 MG/DL    BUN/Creatinine ratio 22 (H) 12 - 20      GFR est AA 24 (L) >60 ml/min/1.73m2    GFR est non-AA 20 (L) >60 ml/min/1.73m2    Calcium 8.3 (L) 8.5 - 10.1 MG/DL    Bilirubin, total 0.9 0.2 - 1.0 MG/DL    ALT (SGPT) 56 13 - 56 U/L    AST (SGOT) 53 (H) 10 - 38 U/L    Alk. phosphatase 65 45 - 117 U/L    Protein, total 7.6 6.4 - 8.2 g/dL    Albumin 3.2 (L) 3.4 - 5.0 g/dL    Globulin 4.4 (H) 2.0 - 4.0 g/dL    A-G Ratio 0.7 (L) 0.8 - 1.7     TROPONIN I    Collection Time: 06/23/20 10:45 AM   Result Value Ref Range    Troponin-I, QT 0.02 0.0 - 0.045 NG/ML   URINALYSIS W/ RFLX MICROSCOPIC    Collection Time: 06/23/20 10:53 AM   Result Value Ref Range    Color DARK YELLOW      Appearance CLOUDY      Specific gravity 1.018 1.005 - 1.030      pH (UA) 5.0 5.0 - 8.0      Protein 30 (A) NEG mg/dL    Glucose Negative NEG mg/dL    Ketone TRACE (A) NEG mg/dL    Bilirubin SMALL (A) NEG      Blood Negative NEG      Urobilinogen 4.0 (H) 0.2 - 1.0 EU/dL    Nitrites Negative NEG      Leukocyte Esterase TRACE (A) NEG     URINE MICROSCOPIC ONLY    Collection Time: 06/23/20 10:53 AM   Result Value Ref Range    WBC 1 to 3 0 - 4 /hpf    RBC Negative 0 - 5 /hpf    Epithelial cells 3+ 0 - 5 /lpf    Bacteria 2+ (A) NEG /hpf    Yeast 2+ (A) NEG       Radiologic Studies -   XR CHEST PORT   Final Result   IMPRESSION:      No active or acute cardiopulmonary process is evident.            CT Results  (Last 48 hours)    None        CXR Results  (Last 48 hours) 06/23/20 1037  XR CHEST PORT Final result    Impression:  IMPRESSION:       No active or acute cardiopulmonary process is evident. Narrative:  CHEST PORTABLE 1012 hours       COMPARISON: June 14. INDICATIONS: Covid, dehydration weakness and diarrhea. FINDINGS:        Portable single view chest demonstrates:       Lungs: Clear. Cardiac Silhouette And Mediastinal Contours: Normal.       Pleural Spaces: No pneumothorax or pleural effusion evident. Bones And Soft Tissues: Unremarkable for age. Medical Decision Making   I am the first provider for this patient. I reviewed the vital signs, available nursing notes, past medical history, past surgical history, family history and social history. Vital Signs-Reviewed the patient's vital signs. Records Reviewed: Nursing Notes, Old Medical Records, Previous Radiology Studies and Previous Laboratory Studies    Procedures:  Right antecubital IV placement and access  Date/Time: 6/23/2020 10:55 AM  Performed by: PINA Melton  Authorized by: PINA Melton     Consent:     Consent obtained:  Verbal    Consent given by:  Patient    Risks discussed:  Nerve damage, pain, incomplete drainage and bleeding    Alternatives discussed:  Alternative treatment  Indications:     Indications:  No peripheral IV or blood  Pre-procedure details:     Skin preparation:  Antiseptic wash  Anesthesia (see MAR for exact dosages): Anesthesia method:  None  Post-procedure details:     Patient tolerance of procedure: Tolerated well, no immediate complications        Provider Notes (Medical Decision Making): Concerned that there are no more recent lab test for patient's creatinine level and an TATIANA. I spoke with the [facility] medicine team and the residents came down to evaluate patient. Patient has now received 2 L of IV fluids. She is tolerating p.o. well at bedside and feels improved with nausea medications.   She has an appointment scheduled in 2 days with the [facility] family medicine team as an outpatient. Her preference as well as to be discharged home and to continue p.o. intake. MED RECONCILIATION:  No current facility-administered medications for this encounter. Current Outpatient Medications   Medication Sig    ondansetron (Zofran ODT) 8 mg disintegrating tablet Take 1 Tab by mouth every eight (8) hours as needed for Nausea or Vomiting.  cyclobenzaprine (FLEXERIL) 10 mg tablet Take 0.5 Tabs by mouth three (3) times daily as needed for Muscle Spasm(s).  glecaprevir-pibrentasvir (MAVYRET) 100-40 mg tab Take  by mouth.  olmesartan-hydroCHLOROthiazide (BENICAR HCT) 40-25 mg per tablet Take 1 Tab by mouth daily. Indications: high blood pressure    verapamil (CALAN) 120 mg tablet TAKE ONE TABLET BY MOUTH TWICE A DAY    conjugated estrogens (PREMARIN) 0.625 mg/gram vaginal cream Insert 0.5 g into vagina daily. Thin strip in vagina daily at  for 3 weeks then stop one week and repeat    albuterol (PROAIR HFA) 90 mcg/actuation inhaler Take 1-2 Puffs by inhalation every four (4) hours as needed for Wheezing.  fluticasone propion-salmeterol (ADVAIR) 100-50 mcg/dose diskus inhaler Take 1 Puff by inhalation every twelve (12) hours.  clobetasol (TEMOVATE) 0.05 % ointment Apply  to affected area two (2) times a day.  ergocalciferol (VITAMIN D2) 50,000 unit capsule TAKE ONE CAPSULE BY MOUTH EVERY 7 DAYS    magnesium oxide (MAG-OX) 400 mg tablet Take 1 Tab by mouth daily.  meloxicam (MOBIC) 15 mg tablet Take 1 Tab by mouth daily. Indications: OSTEOARTHRITIS    dexlansoprazole (DEXILANT) 30 mg capsule Take 1 Cap by mouth daily as needed. Indications: GASTROESOPHAGEAL REFLUX    mometasone (NASONEX) 50 mcg/actuation nasal spray 2 Sprays by Both Nostrils route daily.  travoprost (TRAVATAN Z) 0.004 % ophthalmic solution Administer 1 Drop to both eyes every evening.  Indications: OPEN ANGLE GLAUCOMA Disposition:  home    DISCHARGE NOTE:   3:24 PM    Pt has been reexamined. Patient has no new complaints, changes, or physical findings. Care plan outlined and precautions discussed. Results of labs, CXR were reviewed with the patient. All medications were reviewed with the patient; will d/c home with zofran. All of pt's questions and concerns were addressed. Patient was instructed and agrees to follow up with PCP, as well as to return to the ED upon further deterioration. Patient is ready to go home. Follow-up Information     Follow up With Specialties Details Why Contact Info    PINA Preciado Physician Assistant Go in 2 days keep your scheduled appointment [address]      [facility] EMERGENCY DEPT Emergency Medicine  If symptoms worsen return immediately [address] [place] [id number]  [phone]          Current Discharge Medication List      START taking these medications    Details   ondansetron (Zofran ODT) 8 mg disintegrating tablet Take 1 Tab by mouth every eight (8) hours as needed for Nausea or Vomiting. Qty: 14 Tab, Refills: 0                 Diagnosis     Clinical Impression:   1. Dehydration    2. Diarrhea, unspecified type    3.  COVID-19 virus detected

## 2020-06-23 NOTE — ED TRIAGE NOTES
Patient arrived from home presenting with dehydration and weakness. Patient stated she hasn't eaten in 6 days been feeling nauseous, diarrhea more than 5 times a day.

## 2020-06-23 NOTE — PROGRESS NOTES
Nichole Luna, who is a 60 y.o.,female. Patient's Primary Language is: [place]. According to the patient's EMR Zoroastrianism Affiliation is: Randy Garcia can provide Spiritual Care to [facility] patients via phone conference. Patients can call Spiritual Care at [phone], or dial; or - dial 0 - on their hospital phones and ask the  to page a . No physical visits will be initiated by Chaplains while patients are in isolation for [facility]. Connect care will be noted with DNV (do not visit) until patients are no longer in [address] isolation. If a COVID19 patient has a phone conference with a ; connect care will be noted [id number]-[id number]-(followed by the Chaplains initials). Plan:  Chaplains will continue to follow and will provide pastoral care on an as needed/requested basis.  recommends bedside caregivers page  on duty if patient shows signs of acute spiritual or emotional distress.       [phone]

## 2020-06-26 ENCOUNTER — HOSPITAL ENCOUNTER (OUTPATIENT)
Dept: LAB | Age: 65
Discharge: HOME | End: 2020-06-26

## 2020-06-26 LAB — SENTARA SPECIMEN COL,SENBCF: NORMAL

## 2020-06-26 PROCEDURE — 99001 SPECIMEN HANDLING PT-LAB: CPT

## 2020-06-29 ENCOUNTER — OFFICE VISIT (OUTPATIENT)
Dept: FAMILY MEDICINE CLINIC | Facility: CLINIC | Age: 65
End: 2020-06-29

## 2020-06-29 VITALS
HEART RATE: 93 BPM | OXYGEN SATURATION: 98 % | RESPIRATION RATE: 18 BRPM | SYSTOLIC BLOOD PRESSURE: 115 MMHG | TEMPERATURE: 97.2 F | DIASTOLIC BLOOD PRESSURE: 82 MMHG

## 2020-06-29 DIAGNOSIS — U07.1 COVID-19 VIRUS DETECTED: Primary | ICD-10-CM

## 2020-06-29 DIAGNOSIS — R05.9 COUGH: ICD-10-CM

## 2020-06-29 LAB
S PYO AG THROAT QL: NEGATIVE
VALID INTERNAL CONTROL?: YES

## 2020-06-29 NOTE — PROGRESS NOTES
Narinder Manzano presents today for   Chief Complaint   Patient presents with    Concern For COVID-19 (Coronavirus)     pt tested Covid-19 (+) on 6/14    Cough     productive w brownish phlegm    Diarrhea       Is someone accompanying this pt? no    Is the patient using any DME equipment during OV? no    Travel and Exposure Screening was performed during check in or rooming process Yes  Yes: Comment: pt hasnt traveled. pt has been quarantined and is here for retesting. Depression Screening:  3 most recent PHQ Screens 6/29/2020   Little interest or pleasure in doing things Not at all   Feeling down, depressed, irritable, or hopeless Several days   Total Score PHQ 2 1       Fall Risk  No flowsheet data found.     This Visit Test  Results for orders placed or performed in visit on 06/29/20   AMB POC RAPID STREP A   Result Value Ref Range    VALID INTERNAL CONTROL POC Yes     Group A Strep Ag Negative Negative

## 2020-06-29 NOTE — PROGRESS NOTES
Chief Complaint   Patient presents with    Concern For COVID-19 (Coronavirus)     pt tested Covid-19 (+) on 6/14    Cough     productive w brownish phlegm    Diarrhea       SUBJECTIVE:    The patient presents to our specialty flu / COVID-19 clinic with a focused complaint of the following: Despite the chief complaint that the patient told the nurse, this patient denied all symptoms to me. Patient denies recent travel. Pt exposed to sick contacts under investigations for possible Covid YES. Patient was tested positive for COVID-19 on 6/14/2020. She works for [employer]. Pt is a current smoker. OBJECTIVE    Visit Vitals  /82 (BP 1 Location: Left arm, BP Patient Position: Sitting)   Pulse 93   Temp 97.2 °F (36.2 °C) (Oral)   Resp 18   SpO2 98%      General:  alert, well nourished, cooperative, pleasant and in no apparent distress. Eyes:   The lids are without swelling, lesions, or drainage. The conjunctiva is clear and noninjected. ENT:  ENT exam normal, no neck nodes or sinus tenderness. Neck: normal.  Lungs/CV: clear to auscultation, no wheezes or rales and unlabored breathing. Heart: regular rhythm normal rate. Skin:  No rashes, no jaundice. ASSESSMENT / PLAN     ICD-10-CM ICD-9-CM    1. COVID-19 virus detected U07.1 519.8 NOVEL CORONAVIRUS (COVID-19)     079.89    2. Cough R05 786.2 AMB POC RAPID STREP A      NOVEL CORONAVIRUS (COVID-19)       not tested for influenza. negative for strep. Based on CDC recommendations and limited testing supplies, only those patients who meet criteria will be tested for Covid.      High priority groups for testing   Symptomatic and/or Exposure /Test for Covid  Immunocompromised host (on prednisone, biological therapy, blood cancer, metastatic cancer or active chemotherapy)   [facility] worker in the home    Other high-risk group: o age >47   o Uncontrolled DM   o Uncontrolled HTN   o BMI >40, CKD/ESRD    Dialysis patients (patients going to HD units, not asymptomatic home HD/PD)    Anyone living in a congregate setting     Non High-risk patient category           Test for COVID-19   Asymptomatic, no known exposure  No    Asymptomatic, possible exposure  No    Asymptomatic, definite exposure  Provider discretion    Symptomatic, no known exposure  Yes    Symptomatic, + exposure  Yes      Patient does  meet criteria for Covid testing. COVID-19 testing was completed. Awaiting Covid 19 results at this time. Instructed pt on the importance of rest, fluid intake (with avoidance of red fluids), and vit C supplements. Instructed pt to check temp if possible and to take acetaminophen if fevers are noted, avoid NSAIDs. Remember to wash hands, disinfect surroundings, and avoid touching face. Instructed pt to remain home until Covid results are negative and all symptoms have improved or subsided. If they must leave home, wear a mask. Patient verbalized understanding. We have provided the patient with a detailed after visit summary which was reviewed, and red flag symptoms that would warrant an ER visit were emphasized. CC'd chart to PCP: Yes: Comment: Rukhsana Vo, AGNP-C, DNP      This visit was provided as a focused evaluation during the COVID -19 pandemic/national emergency. A comprehensive review of all previous patient history and testing was not conducted. Pertinent findings were elicited during the visit.

## 2020-07-02 ENCOUNTER — TELEPHONE (OUTPATIENT)
Dept: CASE MANAGEMENT | Age: 65
End: 2020-07-02

## 2020-07-02 NOTE — TELEPHONE ENCOUNTER
Patient resolved from Transition of Care episode on 7/2/2020  Discussed COVID-19 related testing which was available at this time. Test results were positive. Patient informed of results, if available? yes     Patient/family has been provided the following resources and education related to COVID-19:                         Signs, symptoms and red flags related to COVID-19            CDC exposure and quarantine guidelines            [facility] exposure contact - [phone]            Contact for their local Department of Health                 Patient currently reports that the following symptoms have improved:  cough. No further outreach scheduled with this CTN/ACM/LPN/HC/ MA. Episode of Care resolved. Patient has this CTN/ACM/LPN/HC/MA contact information if future needs arise.

## 2020-07-06 ENCOUNTER — HOSPITAL ENCOUNTER (EMERGENCY)
Age: 65
Discharge: HOME | End: 2020-07-07
Attending: EMERGENCY MEDICINE | Admitting: EMERGENCY MEDICINE
Payer: MEDICAID

## 2020-07-06 ENCOUNTER — TELEPHONE (OUTPATIENT)
Dept: FAMILY MEDICINE CLINIC | Facility: CLINIC | Age: 65
End: 2020-07-06

## 2020-07-06 DIAGNOSIS — I10 ESSENTIAL HYPERTENSION: Primary | ICD-10-CM

## 2020-07-06 LAB — SARS-COV-2, NAA: DETECTED

## 2020-07-06 PROCEDURE — 99283 EMERGENCY DEPT VISIT LOW MDM: CPT

## 2020-07-07 ENCOUNTER — TELEPHONE (OUTPATIENT)
Dept: FAMILY MEDICINE CLINIC | Facility: CLINIC | Age: 65
End: 2020-07-07

## 2020-07-07 VITALS
HEART RATE: 85 BPM | TEMPERATURE: 98 F | RESPIRATION RATE: 16 BRPM | OXYGEN SATURATION: 96 % | SYSTOLIC BLOOD PRESSURE: 174 MMHG | DIASTOLIC BLOOD PRESSURE: 96 MMHG

## 2020-07-07 PROCEDURE — 74011250637 HC RX REV CODE- 250/637: Performed by: EMERGENCY MEDICINE

## 2020-07-07 RX ORDER — METOPROLOL TARTRATE 50 MG/1
50 TABLET ORAL
Status: COMPLETED | OUTPATIENT
Start: 2020-07-07 | End: 2020-07-07

## 2020-07-07 RX ADMIN — METOPROLOL TARTRATE 50 MG: 50 TABLET ORAL at 00:39

## 2020-07-07 NOTE — ED PROVIDER NOTES
EMERGENCY DEPARTMENT HISTORY AND PHYSICAL EXAM    1:23 AM      Date: 7/6/2020  Patient Name: Thomas Eisenberg    History of Presenting Illness     Chief Complaint   Patient presents with    Hypertension         History Provided By: Patient    Additional History (Context): Thomas Eisenberg is a 60 y.o. female with hypertension and COPD who presents with elevated blood pressure. Patient is on verapamil and Benicar but she has not taken her medication today. Her blood pressure had been low and she has been observed by her primary care physician. She denies chest pain, shortness of breath, fever, cough, nausea, vomiting or diarrhea. Patient denies smoking, alcohol or recreational drug use. PCP: Avelina West MD      Current Outpatient Medications   Medication Sig Dispense Refill    ondansetron (Zofran ODT) 8 mg disintegrating tablet Take 1 Tab by mouth every eight (8) hours as needed for Nausea or Vomiting. 14 Tab 0    cyclobenzaprine (FLEXERIL) 10 mg tablet Take 0.5 Tabs by mouth three (3) times daily as needed for Muscle Spasm(s). 60 Tab 1    glecaprevir-pibrentasvir (MAVYRET) 100-40 mg tab Take  by mouth.  olmesartan-hydroCHLOROthiazide (BENICAR HCT) 40-25 mg per tablet Take 1 Tab by mouth daily. Indications: high blood pressure 30 Tab 6    verapamil (CALAN) 120 mg tablet TAKE ONE TABLET BY MOUTH TWICE A DAY 60 Tab 6    conjugated estrogens (PREMARIN) 0.625 mg/gram vaginal cream Insert 0.5 g into vagina daily. Thin strip in vagina daily at HS for 3 weeks then stop one week and repeat 45 Each 3    albuterol (PROAIR HFA) 90 mcg/actuation inhaler Take 1-2 Puffs by inhalation every four (4) hours as needed for Wheezing. 1 Inhaler 6    fluticasone propion-salmeterol (ADVAIR) 100-50 mcg/dose diskus inhaler Take 1 Puff by inhalation every twelve (12) hours. 1 Inhaler 6    clobetasol (TEMOVATE) 0.05 % ointment Apply  to affected area two (2) times a day.  45 g 0    ergocalciferol (VITAMIN D2) 50,000 unit capsule TAKE ONE CAPSULE BY MOUTH EVERY 7 DAYS 4 Cap 11    magnesium oxide (MAG-OX) 400 mg tablet Take 1 Tab by mouth daily. 30 Tab 11    meloxicam (MOBIC) 15 mg tablet Take 1 Tab by mouth daily. Indications: OSTEOARTHRITIS 30 Tab 1    dexlansoprazole (DEXILANT) 30 mg capsule Take 1 Cap by mouth daily as needed. Indications: GASTROESOPHAGEAL REFLUX 90 Cap 3    mometasone (NASONEX) 50 mcg/actuation nasal spray 2 Sprays by Both Nostrils route daily. 2 Container 0    travoprost (TRAVATAN Z) 0.004 % ophthalmic solution Administer 1 Drop to both eyes every evening. Indications: OPEN ANGLE GLAUCOMA 5 mL 3       Past History     Past Medical History:  Past Medical History:   Diagnosis Date    Abnormal liver enzymes 06/2018    Arthritis of knee, left 2007    Chronic obstructive pulmonary disease (HCC)     mild    Cirrhosis of liver ([facility] [address] 01/24/2018    Stage F2 fibrosis,compensated    Diverticulosis of sigmoid colon 10/2017    Mild diverticulosis    Erythrasma May 2014    beba feet, lower left leg    Glaucoma     H/O mammogram 03/01/2016    normal, f/u in 1 year    Heartburn 2018    Hepatitis C 2002    genotype 1a, previous IV drug user, declined interferons     Hypertension 2002    Hypomagnesemia 11/18/2015    Migraine 34/18/6360    Nonalcoholic fatty liver disease     Obesity 08/2018    Positive FIT (fecal immunochemical test) 07/26/2017    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 Anisha Louis recommends your next colonoscopy in 5 years.     HX OTHER SURGICAL  2002    liver biopsy    HX PREMALIG/BENIGN SKIN LESION EXCISION  07/24/2019    right ear cyst removed    HX TUBAL LIGATION         Family History:  Family History   Problem Relation Age of Onset    Hypertension Mother     COPD Mother     Other Mother         Poly? nervous system    Cancer Father         rare form bone     Colon Polyps Sister     Colon Polyps Maternal Aunt        Social History:  Social History     Tobacco Use    Smoking status: Current Every Day Smoker     Packs/day: 0.50     Types: Cigarettes     Start date: 1/12/1974    Smokeless tobacco: Never Used   Substance Use Topics    Alcohol use: No    Drug use: Yes     Comment: IV crack cocaine, in remission since 2002       Allergies: Allergies   Allergen Reactions    Compazine [Prochlorperazine] Hives         Review of Systems       Review of Systems   Constitutional: Negative. Negative for chills, diaphoresis and fever. HENT: Negative. Negative for congestion, rhinorrhea and sore throat. Eyes: Negative. Negative for pain, discharge and redness. Respiratory: Negative. Negative for cough, chest tightness, shortness of breath and wheezing. Cardiovascular: Negative. Negative for chest pain. Gastrointestinal: Negative. Negative for abdominal pain, constipation, diarrhea, nausea and vomiting. Genitourinary: Negative. Negative for dysuria, flank pain, frequency, hematuria and urgency. Musculoskeletal: Negative. Negative for back pain and neck pain. Skin: Negative. Negative for rash. Neurological: Negative. Negative for syncope, weakness, numbness and headaches. Psychiatric/Behavioral: Negative. All other systems reviewed and are negative. Physical Exam     Visit Vitals  BP (!) 174/96   Pulse 85   Temp 98 °F (36.7 °C)   Resp 16   SpO2 96%         Physical Exam  Vitals signs and nursing note reviewed. Constitutional:       General: She is not in acute distress.      Appearance: Normal appearance. She is well-developed. She is not ill-appearing, toxic-appearing or diaphoretic. HENT:      Head: Normocephalic and atraumatic. Mouth/Throat:      Pharynx: No oropharyngeal exudate. Eyes:      General: No scleral icterus. Conjunctiva/sclera: Conjunctivae normal.      Pupils: Pupils are equal, round, and reactive to light. Neck:      Musculoskeletal: Normal range of motion and neck supple. Thyroid: No thyromegaly. Vascular: No hepatojugular reflux or JVD. Trachea: No tracheal deviation. Cardiovascular:      Rate and Rhythm: Normal rate and regular rhythm. Pulses: Normal pulses. Radial pulses are 2+ on the right side and 2+ on the left side. Dorsalis pedis pulses are 2+ on the right side and 2+ on the left side. Heart sounds: Normal heart sounds, S1 normal and S2 normal. No murmur. No gallop. No S3 or S4 sounds. Pulmonary:      Effort: Pulmonary effort is normal. No respiratory distress. Breath sounds: Normal breath sounds. No decreased breath sounds, wheezing, rhonchi or rales. Abdominal:      General: Bowel sounds are normal. There is no distension. Palpations: Abdomen is soft. Abdomen is not rigid. There is no mass. Tenderness: There is no abdominal tenderness. There is no guarding or rebound. Negative signs include Hernández's sign and McBurney's sign. Musculoskeletal: Normal range of motion. Lymphadenopathy:      Head:      Right side of head: No submental, submandibular, preauricular or occipital adenopathy. Left side of head: No submental, submandibular, preauricular or occipital adenopathy. Cervical: No cervical adenopathy. Upper Body:      Right upper body: No supraclavicular adenopathy. Left upper body: No supraclavicular adenopathy. Skin:     General: Skin is warm and dry. Findings: No rash. Neurological:      Mental Status: She is alert. She is not disoriented.       GCS: GCS eye subscore is 4. GCS verbal subscore is 5. GCS motor subscore is 6. Cranial Nerves: No cranial nerve deficit. Sensory: No sensory deficit. Coordination: Coordination normal.      Gait: Gait normal.      Deep Tendon Reflexes: Reflexes are normal and symmetric. Psychiatric:         Speech: Speech normal.         Behavior: Behavior normal.         Thought Content: Thought content normal.         Judgment: Judgment normal.           Diagnostic Study Results     Labs -  No results found for this or any previous visit (from the past 12 hour(s)). Radiologic Studies -   No orders to display         Medical Decision Making   Provider Notes (Medical Decision Making):  MDM  Number of Diagnoses or Management Options  Essential hypertension:       I am the first provider for this patient. I reviewed the vital signs, available nursing notes, past medical history, past surgical history, family history and social history. Vital Signs-Reviewed the patient's vital signs. Records Reviewed: Nursing Notes (Time of Review: 1:23 AM)    ED Course: Progress Notes, Reevaluation, and Consults:    Patient is not complaining of any symptoms at this time. I do not feel labs are needed. Blood pressures in the 197 systolic. Patient to be treated here with metoprolol and resume her pressure medication tomorrow. Patient is agreeable to this. 12:23 AM 7/6/2020        Diagnosis       I have reassessed the patient. Patient is feeling well. Patient was discharged in stable condition. Patient is to return to emergency department if any new or worsening condition. Clinical Impression:   1.  Essential hypertension        Disposition: Discharged home     Follow-up Information     Follow up With Specialties Details Why Contact Info    Johnny Schrader MD [facility] In 2 days  [address] [facility]  [phone]               Attestation        Provider Attestation:     I personally performed the services described in the documentation, reviewed the documentation and it accurately and completely records my words and actions utilizing the [address] [place] July 07, 2020 at 1:23 AM - [place], [address]. It is dictated using utilizing voice recognition software. Unfortunately this leads to occasional typographical errors. I apologize in advance if the situation occurs. If questions arise please do not hesitate to contact me or call our department.

## 2020-07-07 NOTE — PROGRESS NOTES
Gladis Bhakta, not sure why but it took 8 days to get this patient's results back. Please notify pt their Covid test was Positive. Pt needs to continue self quarantine. Pt needs to return to this clinic for retesting on 7/14/2020 as long as they are afebrile and respiratory symptoms have subsided or improved. If this test is negative then they can return to normal daily living, wear a mask for self protection. Patient needs to follow their work protocol for returning to work.  Thank you

## 2020-07-07 NOTE — ED TRIAGE NOTES
Pt arrived by POV for hypertension. Pt has been off of her blood pressure medications. Pt states her PCP has told her to hold blood pressure medication. Pt had a BP of 160/120 at home and was concerned and wants to be evaluated. Pt has reported she will follow up with [facility] for adjustment of medications.

## 2020-07-07 NOTE — TELEPHONE ENCOUNTER
Called patient and verified identity with 2 pt identifiers. Notified of Positive covid results. Patient scheduled for retesting on 7/14/2020 at 10:40AM. Informed patient to call if any questions or concerns.

## 2020-07-07 NOTE — DISCHARGE INSTRUCTIONS
Patient Education        DASH Diet: Care Instructions  Your Care Instructions     The DASH diet is an eating plan that can help lower your blood pressure. DASH stands for Dietary Approaches to Stop Hypertension. Hypertension is high blood pressure. The DASH diet focuses on eating foods that are high in calcium, potassium, and magnesium. These nutrients can lower blood pressure. The foods that are highest in these nutrients are fruits, vegetables, low-fat dairy products, nuts, seeds, and legumes. But taking calcium, potassium, and magnesium supplements instead of eating foods that are high in those nutrients does not have the same effect. The DASH diet also includes whole grains, fish, and poultry. The DASH diet is one of several lifestyle changes your doctor may recommend to lower your high blood pressure. Your doctor may also want you to decrease the amount of sodium in your diet. Lowering sodium while following the DASH diet can lower blood pressure even further than just the DASH diet alone. Follow-up care is a key part of your treatment and safety. Be sure to make and go to all appointments, and call your doctor if you are having problems. It's also a good idea to know your test results and keep a list of the medicines you take. How can you care for yourself at home? Following the DASH diet  · Eat 4 to 5 servings of fruit each day. A serving is 1 medium-sized piece of fruit, ½ cup chopped or canned fruit, 1/4 cup dried fruit, or 4 ounces (½ cup) of fruit juice. Choose fruit more often than fruit juice. · Eat 4 to 5 servings of vegetables each day. A serving is 1 cup of lettuce or raw leafy vegetables, ½ cup of chopped or cooked vegetables, or 4 ounces (½ cup) of vegetable juice. Choose vegetables more often than vegetable juice. · Get 2 to 3 servings of low-fat and fat-free dairy each day. A serving is 8 ounces of milk, 1 cup of yogurt, or 1 ½ ounces of cheese. · Eat 6 to 8 servings of grains each day.  A serving is 1 slice of bread, 1 ounce of dry cereal, or ½ cup of cooked rice, pasta, or cooked cereal. Try to choose whole-grain products as much as possible. · Limit lean meat, poultry, and fish to 2 servings each day. A serving is 3 ounces, about the size of a deck of cards. · Eat 4 to 5 servings of nuts, seeds, and legumes (cooked dried beans, lentils, and split peas) each week. A serving is 1/3 cup of nuts, 2 tablespoons of seeds, or ½ cup of cooked beans or peas. · Limit fats and oils to 2 to 3 servings each day. A serving is 1 teaspoon of vegetable oil or 2 tablespoons of salad dressing. · Limit sweets and added sugars to 5 servings or less a week. A serving is 1 tablespoon jelly or jam, ½ cup sorbet, or 1 cup of lemonade. · Eat less than 2,300 milligrams (mg) of sodium a day. If you limit your sodium to 1,500 mg a day, you can lower your blood pressure even more. Tips for success  · Start small. Do not try to make dramatic changes to your diet all at once. You might feel that you are missing out on your favorite foods and then be more likely to not follow the plan. Make small changes, and stick with them. Once those changes become habit, add a few more changes. · Try some of the following:  ? Make it a goal to eat a fruit or vegetable at every meal and at snacks. This will make it easy to get the recommended amount of fruits and vegetables each day. ? Try yogurt topped with fruit and nuts for a snack or healthy dessert. ? Add lettuce, tomato, cucumber, and onion to sandwiches. ? Combine a ready-made pizza crust with low-fat mozzarella cheese and lots of vegetable toppings. Try using tomatoes, squash, spinach, broccoli, carrots, cauliflower, and onions. ? Have a variety of cut-up vegetables with a low-fat dip as an appetizer instead of chips and dip. ? Sprinkle sunflower seeds or chopped almonds over salads. Or try adding chopped walnuts or almonds to cooked vegetables.   ? Try some vegetarian meals using beans and peas. Add garbanzo or kidney beans to salads. Make burritos and tacos with mashed corona beans or black beans. Where can you learn more? Go to http://drew-tarun.info/  Enter H967 in the search box to learn more about \"DASH Diet: Care Instructions. \"  Current as of: December 16, 2019               Content Version: 12.5  © 2006-2020 [employer]. Care instructions adapted under license by [employer] (which disclaims liability or warranty for this information). If you have questions about a medical condition or this instruction, always ask your healthcare professional. [address] any warranty or liability for your use of this information. Patient Education        High Blood Pressure: Care Instructions  Overview     It's normal for blood pressure to go up and down throughout the day. But if it stays up, you have high blood pressure. Another name for high blood pressure is hypertension. Despite what a lot of people think, high blood pressure usually doesn't cause headaches or make you feel dizzy or lightheaded. It usually has no symptoms. But it does increase your risk of stroke, heart attack, and other problems. You and your doctor will talk about your risks of these problems based on your blood pressure. Your doctor will give you a goal for your blood pressure. Your goal will be based on your health and your age. Lifestyle changes, such as eating healthy and being active, are always important to help lower blood pressure. You might also take medicine to reach your blood pressure goal.  Follow-up care is a key part of your treatment and safety. Be sure to make and go to all appointments, and call your doctor if you are having problems. It's also a good idea to know your test results and keep a list of the medicines you take. How can you care for yourself at home?   Medical treatment  · If you stop taking your medicine, your blood pressure will go back up. You may take one or more types of medicine to lower your blood pressure. Be safe with medicines. Take your medicine exactly as prescribed. Call your doctor if you think you are having a problem with your medicine. · Talk to your doctor before you start taking aspirin every day. Aspirin can help certain people lower their risk of a heart attack or stroke. But taking aspirin isn't right for everyone, because it can cause serious bleeding. · See your doctor regularly. You may need to see the doctor more often at first or until your blood pressure comes down. · If you are taking blood pressure medicine, talk to your doctor before you take decongestants or anti-inflammatory medicine, such as ibuprofen. Some of these medicines can raise blood pressure. · Learn how to check your blood pressure at home. Lifestyle changes  · Stay at a healthy weight. This is especially important if you put on weight around the waist. Losing even 10 pounds can help you lower your blood pressure. · If your doctor recommends it, get more exercise. Walking is a good choice. Bit by bit, increase the amount you walk every day. Try for at least 30 minutes on most days of the week. You also may want to swim, bike, or do other activities. · Avoid or limit alcohol. Talk to your doctor about whether you can drink any alcohol. · Try to limit how much sodium you eat to less than 2,300 milligrams (mg) a day. Your doctor may ask you to try to eat less than 1,500 mg a day. · Eat plenty of fruits (such as bananas and oranges), vegetables, legumes, whole grains, and low-fat dairy products. · Lower the amount of saturated fat in your diet. Saturated fat is found in animal products such as milk, cheese, and meat. Limiting these foods may help you lose weight and also lower your risk for heart disease. · Do not smoke. Smoking increases your risk for heart attack and stroke.  If you need help quitting, talk to your doctor about stop-smoking programs and medicines. These can increase your chances of quitting for good. When should you call for help? Call  911 anytime you think you may need emergency care. This may mean having symptoms that suggest that your blood pressure is causing a serious heart or blood vessel problem. Your blood pressure may be over 180/120. For example, call 911 if:  · You have symptoms of a heart attack. These may include:  ? Chest pain or pressure, or a strange feeling in the chest.  ? Sweating. ? Shortness of breath. ? Nausea or vomiting. ? Pain, pressure, or a strange feeling in the back, neck, jaw, or upper belly or in one or both shoulders or arms. ? Lightheadedness or sudden weakness. ? A fast or irregular heartbeat. · You have symptoms of a stroke. These may include:  ? Sudden numbness, tingling, weakness, or loss of movement in your face, arm, or leg, especially on only one side of your body. ? Sudden vision changes. ? Sudden trouble speaking. ? Sudden confusion or trouble understanding simple statements. ? Sudden problems with walking or balance. ? A sudden, severe headache that is different from past headaches. · You have severe back or belly pain. Do not wait until your blood pressure comes down on its own. Get help right away. Call your doctor now or seek immediate care if:  · Your blood pressure is much higher than normal (such as 180/120 or higher), but you don't have symptoms. · You think high blood pressure is causing symptoms, such as:  ? Severe headache.  ? Blurry vision. Watch closely for changes in your health, and be sure to contact your doctor if:  · Your blood pressure measures higher than your doctor recommends at least 2 times. That means the top number is higher or the bottom number is higher, or both. · You think you may be having side effects from your blood pressure medicine. Where can you learn more?   Go to http://drew-tarun.info/  Enter R805 in the search box to learn more about \"High Blood Pressure: Care Instructions. \"  Current as of: December 16, 2019               Content Version: 12.5  © 5864-1289 [employer]. Care instructions adapted under license by [employer] (which disclaims liability or warranty for this information). If you have questions about a medical condition or this instruction, always ask your healthcare professional. [place] [address] any warranty or liability for your use of this information.

## 2020-07-14 ENCOUNTER — OFFICE VISIT (OUTPATIENT)
Dept: FAMILY MEDICINE CLINIC | Facility: CLINIC | Age: 65
End: 2020-07-14

## 2020-07-14 VITALS
DIASTOLIC BLOOD PRESSURE: 75 MMHG | TEMPERATURE: 98.3 F | SYSTOLIC BLOOD PRESSURE: 119 MMHG | RESPIRATION RATE: 14 BRPM | HEART RATE: 86 BPM | OXYGEN SATURATION: 99 %

## 2020-07-14 DIAGNOSIS — U07.1 COVID-19 VIRUS DETECTED: Primary | ICD-10-CM

## 2020-07-14 NOTE — PROGRESS NOTES
Raphael Lott presents today for   Chief Complaint   Patient presents with    Covid Testing     retest       Is someone accompanying this pt? no    Is the patient using any DME equipment during OV? no    Travel and Exposure Screening was performed during check in or rooming process Yes  No      Depression Screening:  3 most recent PHQ Screens 6/29/2020   Little interest or pleasure in doing things Not at all   Feeling down, depressed, irritable, or hopeless Several days   Total Score PHQ 2 1       Fall Risk  No flowsheet data found.     This Visit Test  Results for orders placed or performed in visit on 06/29/20   NOVEL CORONAVIRUS (COVID-19)   Result Value Ref Range    SARS-CoV-2, BENJAMIN Detected (A) Not Detected   AMB POC RAPID STREP A   Result Value Ref Range    VALID INTERNAL CONTROL POC Yes     Group A Strep Ag Negative Negative

## 2020-07-18 LAB — SARS-COV-2, NAA: NOT DETECTED

## 2020-07-20 ENCOUNTER — HOSPITAL ENCOUNTER (OUTPATIENT)
Dept: LAB | Age: 65
Discharge: HOME | End: 2020-07-20

## 2020-07-20 LAB — XX-LABCORP SPECIMEN COL,LCBCF: NORMAL

## 2020-07-20 PROCEDURE — 99001 SPECIMEN HANDLING PT-LAB: CPT

## 2020-07-21 ENCOUNTER — TELEPHONE (OUTPATIENT)
Dept: FAMILY MEDICINE CLINIC | Facility: CLINIC | Age: 65
End: 2020-07-21

## 2020-07-21 NOTE — LETTER
7/21/2020 11:06 AM 
 
Ms. Alesha Orellana formerly [facility] [id number] This letter is to inform you that your Covid-19 test done on 7/14/2020 has returned \"not detected\" of the coronavirus. You may return to work. Please wear a mask when outside of your home. If there are any questions or concerns please contact our office. Sincerely, Mitch Engel, DNP

## 2020-09-03 ENCOUNTER — HOSPITAL ENCOUNTER (OUTPATIENT)
Dept: LAB | Age: 65
Discharge: HOME | End: 2020-09-03

## 2020-09-03 LAB — XX-LABCORP SPECIMEN COL,LCBCF: NORMAL

## 2020-09-03 PROCEDURE — 99001 SPECIMEN HANDLING PT-LAB: CPT

## 2020-10-13 ENCOUNTER — HOSPITAL ENCOUNTER (EMERGENCY)
Age: 65
Discharge: HOME | End: 2020-10-13
Attending: EMERGENCY MEDICINE
Payer: MEDICAID

## 2020-10-13 VITALS
OXYGEN SATURATION: 97 % | HEART RATE: 81 BPM | DIASTOLIC BLOOD PRESSURE: 66 MMHG | TEMPERATURE: 97.6 F | SYSTOLIC BLOOD PRESSURE: 122 MMHG | RESPIRATION RATE: 20 BRPM

## 2020-10-13 DIAGNOSIS — R42 DIZZINESS: ICD-10-CM

## 2020-10-13 DIAGNOSIS — R11.0 NAUSEA WITHOUT VOMITING: Primary | ICD-10-CM

## 2020-10-13 LAB
ALBUMIN SERPL-MCNC: 3.4 G/DL (ref 3.4–5)
ALBUMIN/GLOB SERPL: 0.8 {RATIO} (ref 0.8–1.7)
ALP SERPL-CCNC: 70 U/L (ref 45–117)
ALT SERPL-CCNC: 42 U/L (ref 13–56)
ANION GAP SERPL CALC-SCNC: 5 MMOL/L (ref 3–18)
APPEARANCE UR: ABNORMAL
AST SERPL-CCNC: 51 U/L (ref 10–38)
ATRIAL RATE: 80 BPM
BACTERIA URNS QL MICRO: ABNORMAL /HPF
BASOPHILS # BLD: 0 K/UL (ref 0–0.1)
BASOPHILS NFR BLD: 0 % (ref 0–2)
BILIRUB SERPL-MCNC: 0.4 MG/DL (ref 0.2–1)
BILIRUB UR QL: NEGATIVE
BUN SERPL-MCNC: 17 MG/DL (ref 7–18)
BUN/CREAT SERPL: 21 (ref 12–20)
CALCIUM SERPL-MCNC: 8.7 MG/DL (ref 8.5–10.1)
CALCULATED P AXIS, ECG09: 56 DEGREES
CALCULATED R AXIS, ECG10: 7 DEGREES
CALCULATED T AXIS, ECG11: 38 DEGREES
CHLORIDE SERPL-SCNC: 114 MMOL/L (ref 100–111)
CO2 SERPL-SCNC: 25 MMOL/L (ref 21–32)
COLOR UR: YELLOW
CREAT SERPL-MCNC: 0.82 MG/DL (ref 0.6–1.3)
DIAGNOSIS, 93000: NORMAL
DIFFERENTIAL METHOD BLD: ABNORMAL
EOSINOPHIL # BLD: 0.2 K/UL (ref 0–0.4)
EOSINOPHIL NFR BLD: 4 % (ref 0–5)
EPITH CASTS URNS QL MICRO: ABNORMAL /LPF (ref 0–5)
ERYTHROCYTE [DISTWIDTH] IN BLOOD BY AUTOMATED COUNT: 12.6 % (ref 11.6–14.5)
GLOBULIN SER CALC-MCNC: 4.1 G/DL (ref 2–4)
GLUCOSE SERPL-MCNC: 108 MG/DL (ref 74–99)
GLUCOSE UR STRIP.AUTO-MCNC: NEGATIVE MG/DL
HCT VFR BLD AUTO: 37.4 % (ref 35–45)
HGB BLD-MCNC: 12.9 G/DL (ref 12–16)
HGB UR QL STRIP: NEGATIVE
KETONES UR QL STRIP.AUTO: NEGATIVE MG/DL
LEUKOCYTE ESTERASE UR QL STRIP.AUTO: NEGATIVE
LYMPHOCYTES # BLD: 1.3 K/UL (ref 0.9–3.6)
LYMPHOCYTES NFR BLD: 23 % (ref 21–52)
MCH RBC QN AUTO: 30.9 PG (ref 24–34)
MCHC RBC AUTO-ENTMCNC: 34.5 G/DL (ref 31–37)
MCV RBC AUTO: 89.5 FL (ref 74–97)
MONOCYTES # BLD: 0.3 K/UL (ref 0.05–1.2)
MONOCYTES NFR BLD: 6 % (ref 3–10)
NEUTS SEG # BLD: 3.6 K/UL (ref 1.8–8)
NEUTS SEG NFR BLD: 67 % (ref 40–73)
NITRITE UR QL STRIP.AUTO: NEGATIVE
P-R INTERVAL, ECG05: 198 MS
PH UR STRIP: 6.5 [PH] (ref 5–8)
PLATELET # BLD AUTO: 156 K/UL (ref 135–420)
PMV BLD AUTO: 11.1 FL (ref 9.2–11.8)
POTASSIUM SERPL-SCNC: 4.1 MMOL/L (ref 3.5–5.5)
PROT SERPL-MCNC: 7.5 G/DL (ref 6.4–8.2)
PROT UR STRIP-MCNC: ABNORMAL MG/DL
Q-T INTERVAL, ECG07: 384 MS
QRS DURATION, ECG06: 90 MS
QTC CALCULATION (BEZET), ECG08: 442 MS
RBC # BLD AUTO: 4.18 M/UL (ref 4.2–5.3)
RBC #/AREA URNS HPF: ABNORMAL /HPF (ref 0–5)
SODIUM SERPL-SCNC: 144 MMOL/L (ref 136–145)
SP GR UR REFRACTOMETRY: 1.02 (ref 1–1.03)
UROBILINOGEN UR QL STRIP.AUTO: 1 EU/DL (ref 0.2–1)
VENTRICULAR RATE, ECG03: 80 BPM
WBC # BLD AUTO: 5.4 K/UL (ref 4.6–13.2)
WBC URNS QL MICRO: ABNORMAL /HPF (ref 0–4)
YEAST URNS QL MICRO: ABNORMAL

## 2020-10-13 PROCEDURE — 96374 THER/PROPH/DIAG INJ IV PUSH: CPT

## 2020-10-13 PROCEDURE — 85025 COMPLETE CBC W/AUTO DIFF WBC: CPT

## 2020-10-13 PROCEDURE — 99284 EMERGENCY DEPT VISIT MOD MDM: CPT

## 2020-10-13 PROCEDURE — 74011250636 HC RX REV CODE- 250/636: Performed by: STUDENT IN AN ORGANIZED HEALTH CARE EDUCATION/TRAINING PROGRAM

## 2020-10-13 PROCEDURE — 93005 ELECTROCARDIOGRAM TRACING: CPT

## 2020-10-13 PROCEDURE — 80053 COMPREHEN METABOLIC PANEL: CPT

## 2020-10-13 PROCEDURE — 81001 URINALYSIS AUTO W/SCOPE: CPT

## 2020-10-13 RX ORDER — ONDANSETRON 4 MG/1
4 TABLET, FILM COATED ORAL
Qty: 12 TAB | Refills: 0 | Status: SHIPPED | OUTPATIENT
Start: 2020-10-13 | End: 2021-05-06

## 2020-10-13 RX ORDER — ONDANSETRON 2 MG/ML
4 INJECTION INTRAMUSCULAR; INTRAVENOUS
Status: COMPLETED | OUTPATIENT
Start: 2020-10-13 | End: 2020-10-13

## 2020-10-13 RX ORDER — MECLIZINE HYDROCHLORIDE 25 MG/1
25 TABLET ORAL DAILY
Qty: 10 TAB | Refills: 0 | Status: SHIPPED | OUTPATIENT
Start: 2020-10-14 | End: 2020-10-24

## 2020-10-13 RX ORDER — MECLIZINE HCL 12.5 MG 12.5 MG/1
25 TABLET ORAL DAILY
Status: DISCONTINUED | OUTPATIENT
Start: 2020-10-13 | End: 2020-10-13 | Stop reason: HOSPADM

## 2020-10-13 RX ORDER — MECLIZINE HYDROCHLORIDE 25 MG/1
TABLET ORAL
Qty: 20 TAB | Refills: 0 | Status: SHIPPED | OUTPATIENT
Start: 2020-10-13

## 2020-10-13 RX ORDER — ONDANSETRON 2 MG/ML
4 INJECTION INTRAMUSCULAR; INTRAVENOUS
Qty: 2 ML | Refills: 0 | Status: SHIPPED | OUTPATIENT
Start: 2020-10-13 | End: 2020-10-13

## 2020-10-13 RX ADMIN — ONDANSETRON 4 MG: 2 INJECTION INTRAMUSCULAR; INTRAVENOUS at 10:02

## 2020-10-13 RX ADMIN — SODIUM CHLORIDE, SODIUM LACTATE, POTASSIUM CHLORIDE, AND CALCIUM CHLORIDE 500 ML: 600; 310; 30; 20 INJECTION, SOLUTION INTRAVENOUS at 10:09

## 2020-10-13 RX ADMIN — MECLIZINE 25 MG: 12.5 TABLET ORAL at 10:01

## 2020-10-13 NOTE — ED PROVIDER NOTES
EMERGENCY DEPARTMENT HISTORY AND PHYSICAL EXAM    7:15 AM      Date: 10/13/2020  Patient Name: Cami Daniels    History of Presenting Illness     Chief Complaint   Patient presents with    Dizziness         History Provided By: Patient  Location/Duration/Severity/Modifying factors   HPI     59-year-old female with past medical history significant for hypertension, hepatitis C, and COPD presents with a chief complaint of dizziness, onset 2 hours ago. Patient states her dizziness is worse when she sits up, or stands. When asked to describe dizziness she states it is \"lightheaded\". She states the room is not spinning. Patient struck she is also experiencing nausea, no vomiting. Patient denies any chest pain, shortness of breath, fever, chills, diarrhea, headache, visual symptoms, or neck/back pain. PCP: Kirit Johnson MD    Current Facility-Administered Medications   Medication Dose Route Frequency Provider Last Rate Last Dose    meclizine (ANTIVERT) tablet 25 mg  25 mg Oral DAILY Cristina Velasquez MD   25 mg at 10/13/20 1001     Current Outpatient Medications   Medication Sig Dispense Refill    [START ON 10/14/2020] meclizine (ANTIVERT) 25 mg tablet Take 1 Tab by mouth daily for 10 days. 10 Tab 0    ondansetron (ZOFRAN) 4 mg/2 mL soln 2 mL by IntraVENous route now for 1 dose. 2 mL 0    ondansetron hcl (Zofran) 4 mg tablet Take 1 Tab by mouth every eight (8) hours as needed for Nausea. 12 Tab 0    meclizine (ANTIVERT) 25 mg tablet Take 1 tablet by mouth every 6 hours for the next 3 days. Then stop taking the meclizine. Restart the meclizine for 3 day intervals if vertigo/ dizziness returns. 20 Tab 0    ondansetron (Zofran ODT) 8 mg disintegrating tablet Take 1 Tab by mouth every eight (8) hours as needed for Nausea or Vomiting. 14 Tab 0    cyclobenzaprine (FLEXERIL) 10 mg tablet Take 0.5 Tabs by mouth three (3) times daily as needed for Muscle Spasm(s).  [facility] (MAVYRET) 100-40 mg tab Take  by mouth.  olmesartan-hydroCHLOROthiazide (BENICAR HCT) 40-25 mg per tablet Take 1 Tab by mouth daily. Indications: high blood pressure 30 Tab 6    verapamil (CALAN) 120 mg tablet TAKE ONE TABLET BY MOUTH TWICE A DAY 60 Tab 6    conjugated estrogens (PREMARIN) 0.625 mg/gram vaginal cream Insert 0.5 g into vagina daily. Thin strip in vagina daily at HS for 3 weeks then stop one week and repeat 45 Each 3    albuterol (PROAIR HFA) 90 mcg/actuation inhaler Take 1-2 Puffs by inhalation every four (4) hours as needed for Wheezing. 1 Inhaler 6    fluticasone propion-salmeterol (ADVAIR) 100-50 mcg/dose diskus inhaler Take 1 Puff by inhalation every twelve (12) hours. 1 Inhaler 6    clobetasol (TEMOVATE) 0.05 % ointment Apply  to affected area two (2) times a day. 45 g 0    ergocalciferol (VITAMIN D2) 50,000 unit capsule TAKE ONE CAPSULE BY MOUTH EVERY 7 DAYS 4 Cap 11    magnesium oxide (MAG-OX) 400 mg tablet Take 1 Tab by mouth daily. 30 Tab 11    meloxicam (MOBIC) 15 mg tablet Take 1 Tab by mouth daily. Indications: OSTEOARTHRITIS 30 Tab 1    dexlansoprazole (DEXILANT) 30 mg capsule Take 1 Cap by mouth daily as needed. Indications: GASTROESOPHAGEAL REFLUX 90 Cap 3    mometasone (NASONEX) 50 mcg/actuation nasal spray 2 Sprays by Both Nostrils route daily. 2 Container 0    travoprost (TRAVATAN Z) 0.004 % ophthalmic solution Administer 1 Drop to both eyes every evening.  Indications: OPEN ANGLE GLAUCOMA 5 mL 3       Past History     Past Medical History:  Past Medical History:   Diagnosis Date    Abnormal liver enzymes 06/2018    Arthritis of knee, left 2007    Chronic obstructive pulmonary disease (HCC)     mild    Cirrhosis of liver ([facility] [address] 01/24/2018    Stage F2 fibrosis,compensated    Diverticulosis of sigmoid colon 10/2017    Mild diverticulosis    Erythrasma May 2014    beba feet, lower left leg    Glaucoma     H/O mammogram 03/01/2016    normal, f/u in 1 year    Heartburn 2018    Hepatitis C 2002    genotype 1a, previous IV drug user, declined interferons     Hypertension 2002    Hypomagnesemia 11/18/2015    Migraine 95/87/2456    Nonalcoholic fatty liver disease     Obesity 08/2018    Positive FIT (fecal immunochemical test) 07/26/2017    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 concern for Necrolytic acral erythema    Tobacco use     working with [facility] smoking cessation        Past Surgical History:  Past Surgical History:   Procedure Laterality Date    COLONOSCOPY N/A 10/11/2017    COLONOSCOPY performed by Toni Stevens MD at [address] HX CHOLECYSTECTOMY  2006    laparoscopic    HX COLONOSCOPY  2012    hx of polyps, [place]    HX COLONOSCOPY  10/2017     Dr. Padgett recommends your next colonoscopy in 5 years.  HX OTHER SURGICAL  2002    liver biopsy    HX PREMALIG/BENIGN SKIN LESION EXCISION  07/24/2019    right ear cyst removed    HX TUBAL LIGATION         Family History:  Family History   Problem Relation Age of Onset    Hypertension Mother     COPD Mother     Other Mother         Poly? nervous system    Cancer Father         rare form bone     Colon Polyps Sister     Colon Polyps Maternal Aunt        Social History:  Social History     Tobacco Use    Smoking status: Current Every Day Smoker     Packs/day: 0.50     Types: Cigarettes     Start date: 1/12/1974    Smokeless tobacco: Never Used   Substance Use Topics    Alcohol use: No    Drug use: Yes     Comment: IV crack cocaine, in remission since 2002       Allergies: Allergies   Allergen Reactions    Compazine [Prochlorperazine] Hives         Review of Systems     Review of Systems   Constitutional: Negative for chills, fatigue and fever. HENT: Negative for congestion, sore throat and voice change. Eyes: Negative for photophobia, redness and visual disturbance.    Respiratory: Negative for cough, chest tightness, shortness of breath, wheezing and stridor. Cardiovascular: Negative for chest pain, palpitations and leg swelling. Gastrointestinal: Positive for nausea. Negative for abdominal pain, constipation, diarrhea and vomiting. Endocrine: Negative for polydipsia and polyuria. Genitourinary: Negative for dysuria, flank pain, frequency and urgency. Musculoskeletal: Negative for back pain, myalgias and neck pain. Skin: Negative for pallor and rash. Neurological: Positive for dizziness and light-headedness. Negative for seizures, syncope, facial asymmetry, speech difficulty, weakness, numbness and headaches. Psychiatric/Behavioral: Negative for confusion and suicidal ideas. The patient is not nervous/anxious. Physical Exam     Visit Vitals  /66   Pulse 81   Temp 97.6 °F (36.4 °C)   Resp 20   SpO2 97%       Physical Exam  Vitals signs and nursing note reviewed. Constitutional:       General: She is not in acute distress. Appearance: She is well-developed. She is not ill-appearing, toxic-appearing or diaphoretic. HENT:      Head: Normocephalic and atraumatic. Eyes:      General: No visual field deficit. Pupils: Pupils are equal, round, and reactive to light. Neck:      Musculoskeletal: Normal range of motion and neck supple. Cardiovascular:      Rate and Rhythm: Normal rate and regular rhythm. Heart sounds: Normal heart sounds. Pulmonary:      Effort: Pulmonary effort is normal. No tachypnea or respiratory distress. Breath sounds: Normal breath sounds. Chest:      Chest wall: No tenderness or edema. Abdominal:      General: Bowel sounds are normal.      Palpations: Abdomen is soft. Musculoskeletal: Normal range of motion. Right lower leg: No edema. Left lower leg: No edema. Skin:     General: Skin is warm and dry. Capillary Refill: Capillary refill takes less than 2 seconds. Findings: No rash. Neurological:      General: No focal deficit present. Mental Status: She is alert and oriented to person, place, and time. GCS: GCS eye subscore is 4. GCS verbal subscore is 5. GCS motor subscore is 6. Cranial Nerves: No cranial nerve deficit, dysarthria or facial asymmetry. Sensory: No sensory deficit. Motor: Motor function is intact. Coordination: Coordination is intact. Gait: Gait is intact. Psychiatric:         Mood and Affect: Mood normal.         Behavior: Behavior normal.           Diagnostic Study Results     Labs -  Recent Results (from the past 12 hour(s))   CBC WITH AUTOMATED DIFF    Collection Time: 10/13/20 10:02 AM   Result Value Ref Range    WBC 5.4 4.6 - 13.2 K/uL    RBC 4.18 (L) 4.20 - 5.30 M/uL    HGB 12.9 12.0 - 16.0 g/dL    HCT 37.4 35.0 - 45.0 %    MCV 89.5 74.0 - 97.0 FL    MCH 30.9 24.0 - 34.0 PG    MCHC 34.5 31.0 - 37.0 g/dL    RDW 12.6 11.6 - 14.5 %    PLATELET 447 720 - 824 K/uL    MPV 11.1 9.2 - 11.8 FL    NEUTROPHILS 67 40 - 73 %    LYMPHOCYTES 23 21 - 52 %    MONOCYTES 6 3 - 10 %    EOSINOPHILS 4 0 - 5 %    BASOPHILS 0 0 - 2 %    ABS. NEUTROPHILS 3.6 1.8 - 8.0 K/UL    ABS. LYMPHOCYTES 1.3 0.9 - 3.6 K/UL    ABS. MONOCYTES 0.3 0.05 - 1.2 K/UL    ABS. EOSINOPHILS 0.2 0.0 - 0.4 K/UL    ABS. BASOPHILS 0.0 0.0 - 0.1 K/UL    DF AUTOMATED     METABOLIC PANEL, COMPREHENSIVE    Collection Time: 10/13/20 10:02 AM   Result Value Ref Range    Sodium 144 136 - 145 mmol/L    Potassium 4.1 3.5 - 5.5 mmol/L    Chloride 114 (H) 100 - 111 mmol/L    CO2 25 21 - 32 mmol/L    Anion gap 5 3.0 - 18 mmol/L    Glucose 108 (H) 74 - 99 mg/dL    BUN 17 7.0 - 18 MG/DL    Creatinine 0.82 0.6 - 1.3 MG/DL    BUN/Creatinine ratio 21 (H) 12 - 20      GFR est AA >60 >60 ml/min/1.73m2    GFR est non-AA >60 >60 ml/min/1.73m2    Calcium 8.7 8.5 - 10.1 MG/DL    Bilirubin, total 0.4 0.2 - 1.0 MG/DL    ALT (SGPT) 42 13 - 56 U/L    AST (SGOT) 51 (H) 10 - 38 U/L    Alk.  phosphatase 70 45 - 117 U/L    Protein, total 7.5 6.4 - 8.2 g/dL    Albumin 3.4 3.4 - 5.0 g/dL    Globulin 4.1 (H) 2.0 - 4.0 g/dL    A-G Ratio 0.8 0.8 - 1.7     URINALYSIS W/ RFLX MICROSCOPIC    Collection Time: 10/13/20 10:02 AM   Result Value Ref Range    Color YELLOW      Appearance CLOUDY      Specific gravity 1.019 1.005 - 1.030      pH (UA) 6.5 5.0 - 8.0      Protein TRACE (A) NEG mg/dL    Glucose Negative NEG mg/dL    Ketone Negative NEG mg/dL    Bilirubin Negative NEG      Blood Negative NEG      Urobilinogen 1.0 0.2 - 1.0 EU/dL    Nitrites Negative NEG      Leukocyte Esterase Negative NEG     URINE MICROSCOPIC ONLY    Collection Time: 10/13/20 10:02 AM   Result Value Ref Range    WBC 1 to 5 0 - 4 /hpf    RBC 0 to 3 0 - 5 /hpf    Epithelial cells 3+ 0 - 5 /lpf    Bacteria FEW (A) NEG /hpf    Yeast RARE NEG   EKG, 12 LEAD, INITIAL    Collection Time: 10/13/20 10:26 AM   Result Value Ref Range    Ventricular Rate 80 BPM    Atrial Rate 80 BPM    P-R Interval 198 ms    QRS Duration 90 ms    Q-T Interval 384 ms    QTC Calculation (Bezet) 442 ms    Calculated P Axis 56 degrees    Calculated R Axis 7 degrees    Calculated T Axis 38 degrees    Diagnosis       Normal sinus rhythm  Normal ECG  When compared with ECG of 23-JUN-2020 10:44,  No significant change was found         Radiologic Studies -   No orders to display         Medical Decision Making   I am the first provider for this patient. I reviewed the vital signs, available nursing notes, past medical history, past surgical history, family history and social history. Vital Signs-Reviewed the patient's vital signs. EKG: NSR. 80 bpm. No evidence of acute ischemia or dysrhythmia. Records Reviewed: Nursing Notes, Old Medical Records, Previous Radiology Studies and Previous Laboratory Studies (Time of Review: 7:15 AM)    ED Course: Progress Notes, Reevaluation, and Consults:  7:48 AM patient seen and evaluated. No acute distress. Vital signs within normal limits. HINTS exam without concern for central cause.   Patient is describing nausea with dizziness, which she repeatedly states is a feeling of lightheadedness. States there is no sensation of room spinning or headache. 11:00AM: Patient describes significant relief after fluids, meclizine, and Zofran. Nonfocal neurologic exam.  Patient ambulatory without difficulty. States Zofran and \"lightheadedness\" has significantly improved. Strict return precautions discussed. Patient discharged to home. Provider Notes (Medical Decision Making):   MDM     71-year-old female with past medical history significant for hypertension, hepatitis C, and COPD presents with a chief complaint of dizziness, onset 2 hours ago. Patient states her dizziness is worse when she sits up, or stands. When asked to describe dizziness she states it is \"lightheaded\". Vital signs within normal limits. Neurologic exam nonfocal.  Patient ambulatory without difficulty. Hints exam normal.  Negative jolt exam.  Patient is not describing any kind of headache or visual symptoms. Lab work grossly unremarkable. EKG shows a normal sinus rhythm, 80 bpm, no evidence of acute ischemia or dysrhythmia. Patient prescribed Zofran and meclizine. Strict return precautions discussed. Procedures      Diagnosis     Clinical Impression:   1. Nausea without vomiting    2. Dizziness        Disposition: Discharge to [facility] W Diana Anderson MD    Follow-up Information     Follow up With Specialties Details Why Contact Info    Jose Ribeiro MD [facility] Medicine Schedule an appointment as soon as possible for a visit in 2 days  [phone]  [phone]      [facility] EMERGENCY DEPT Emergency Medicine  As needed, If symptoms worsen [address] [place] [phone]  [phone]           Patient's Medications   Start Taking    MECLIZINE (ANTIVERT) 25 MG TABLET    Take 1 Tab by mouth daily for 10 days.     MECLIZINE (ANTIVERT) 25 MG TABLET    Take 1 tablet by mouth every 6 hours for the next 3 days. Then stop taking the meclizine. Restart the meclizine for 3 day intervals if vertigo/ dizziness returns. ONDANSETRON (ZOFRAN) 4 MG/2 ML SOLN    2 mL by IntraVENous route now for 1 dose. ONDANSETRON HCL (ZOFRAN) 4 MG TABLET    Take 1 Tab by mouth every eight (8) hours as needed for Nausea. Continue Taking    ALBUTEROL (PROAIR HFA) 90 MCG/ACTUATION INHALER    Take 1-2 Puffs by inhalation every four (4) hours as needed for Wheezing. CLOBETASOL (TEMOVATE) 0.05 % OINTMENT    Apply  to affected area two (2) times a day. CONJUGATED ESTROGENS (PREMARIN) 0.625 MG/GRAM VAGINAL CREAM    Insert 0.5 g into vagina daily. Thin strip in vagina daily at HS for 3 weeks then stop one week and repeat    CYCLOBENZAPRINE (FLEXERIL) 10 MG TABLET    Take 0.5 Tabs by mouth three (3) times daily as needed for Muscle Spasm(s). DEXLANSOPRAZOLE (DEXILANT) 30 MG CAPSULE    Take 1 Cap by mouth daily as needed. Indications: GASTROESOPHAGEAL REFLUX    ERGOCALCIFEROL (VITAMIN D2) 50,000 UNIT CAPSULE    TAKE ONE CAPSULE BY MOUTH EVERY 7 DAYS    FLUTICASONE PROPION-SALMETEROL (ADVAIR) 100-50 MCG/DOSE DISKUS INHALER    Take 1 Puff by inhalation every twelve (12) hours. GLECAPREVIR-PIBRENTASVIR (MAVYRET) 100-40 MG TAB    Take  by mouth. MAGNESIUM OXIDE (MAG-OX) 400 MG TABLET    Take 1 Tab by mouth daily. MELOXICAM (MOBIC) 15 MG TABLET    Take 1 Tab by mouth daily. Indications: OSTEOARTHRITIS    MOMETASONE (NASONEX) 50 MCG/ACTUATION NASAL SPRAY    2 Sprays by Both Nostrils route daily. OLMESARTAN-HYDROCHLOROTHIAZIDE (BENICAR HCT) 40-25 MG PER TABLET    Take 1 Tab by mouth daily. Indications: high blood pressure    ONDANSETRON (ZOFRAN ODT) 8 MG DISINTEGRATING TABLET    Take 1 Tab by mouth every eight (8) hours as needed for Nausea or Vomiting. TRAVOPROST (TRAVATAN Z) 0.004 % OPHTHALMIC SOLUTION    Administer 1 Drop to both eyes every evening.  Indications: OPEN ANGLE GLAUCOMA    VERAPAMIL (CALAN) 120 MG TABLET TAKE ONE TABLET BY MOUTH TWICE A DAY   These Medications have changed    No medications on file   Stop Taking    No medications on file     Disclaimer: Sections of this note are dictated using utilizing voice recognition software. Minor typographical errors may be present. If questions arise, please do not hesitate to contact me or call our department.

## 2020-12-01 ENCOUNTER — OFFICE VISIT (OUTPATIENT)
Dept: ORTHOPEDIC SURGERY | Age: 65
End: 2020-12-01
Payer: MEDICAID

## 2020-12-01 VITALS
HEART RATE: 79 BPM | DIASTOLIC BLOOD PRESSURE: 81 MMHG | BODY MASS INDEX: 39.69 KG/M2 | WEIGHT: 238.2 LBS | OXYGEN SATURATION: 98 % | RESPIRATION RATE: 16 BRPM | HEIGHT: 65 IN | SYSTOLIC BLOOD PRESSURE: 127 MMHG | TEMPERATURE: 95.7 F

## 2020-12-01 DIAGNOSIS — M17.0 PRIMARY OSTEOARTHRITIS OF BOTH KNEES: Primary | ICD-10-CM

## 2020-12-01 DIAGNOSIS — M25.561 RIGHT KNEE PAIN, UNSPECIFIED CHRONICITY: ICD-10-CM

## 2020-12-01 DIAGNOSIS — M25.562 LEFT KNEE PAIN, UNSPECIFIED CHRONICITY: ICD-10-CM

## 2020-12-01 PROCEDURE — 73560 X-RAY EXAM OF KNEE 1 OR 2: CPT | Performed by: ORTHOPAEDIC SURGERY

## 2020-12-01 PROCEDURE — 99214 OFFICE O/P EST MOD 30 MIN: CPT | Performed by: ORTHOPAEDIC SURGERY

## 2020-12-01 PROCEDURE — 20611 DRAIN/INJ JOINT/BURSA W/US: CPT | Performed by: ORTHOPAEDIC SURGERY

## 2020-12-01 RX ORDER — CETIRIZINE HCL 10 MG
10 TABLET ORAL
COMMUNITY
Start: 2020-11-16

## 2020-12-01 RX ORDER — OMEPRAZOLE 40 MG/1
CAPSULE, DELAYED RELEASE ORAL
COMMUNITY
Start: 2020-09-18 | End: 2021-03-22

## 2020-12-01 RX ORDER — TRIAMCINOLONE ACETONIDE 40 MG/ML
80 INJECTION, SUSPENSION INTRA-ARTICULAR; INTRAMUSCULAR ONCE
Status: COMPLETED | OUTPATIENT
Start: 2020-12-01 | End: 2020-12-01

## 2020-12-01 RX ORDER — BIMATOPROST 0.1 MG/ML
1 SOLUTION/ DROPS OPHTHALMIC
COMMUNITY
Start: 2020-09-22 | End: 2022-06-02

## 2020-12-01 RX ADMIN — TRIAMCINOLONE ACETONIDE 80 MG: 40 INJECTION, SUSPENSION INTRA-ARTICULAR; INTRAMUSCULAR at 10:37

## 2020-12-01 NOTE — PROGRESS NOTES
Maury Rogel  1955   Chief Complaint   Patient presents with    Knee Pain     beba knee pain        HISTORY OF PRESENT ILLNESS  Maury Rogel is a 60 y.o. female who presents today for reevaluation of b/l knee. Patient rates pain as 8/10 today. Patient was last seen here in July 2018 and finished a series of Synvisc injections. Pain returned 3 weeks ago. Pain has progressively gotten worse over the last 3 weeks. No new injury. Patient denies any fever, chills, chest pain, shortness of breath or calf pain. The remainder of the review of systems is negative. There are no new illness or injuries to report since last seen in the office. There are no changes to medications, allergies, family or social history. Pain Assessment  12/1/2020   Location of Pain Knee   Location Modifiers Right;Left   Severity of Pain 8   Quality of Pain Throbbing; Sharp;Dull;Aching   Duration of Pain Persistent   Frequency of Pain Constant   Aggravating Factors Stairs; Walking;Standing;Squatting;Bending;Kneeling   Limiting Behavior -   Relieving Factors (No Data)   Relieving Factors Comment tylenol   Result of Injury No         PHYSICAL EXAM:   Visit Vitals  /81 (BP 1 Location: Left arm, BP Patient Position: Sitting)   Pulse 79   Temp (!) 95.7 °F (35.4 °C) (Temporal)   Resp 16   Ht 5' 5\" (1.651 m)   Wt 238 lb 3.2 oz (108 kg)   SpO2 98%   BMI 39.64 kg/m²     The patient is a well-developed, well-nourished female   in no acute distress. The patient is alert and oriented times three. The patient is alert and oriented times three. Mood and affect are normal.  LYMPHATIC: lymph nodes are not enlarged and are within normal limits  SKIN: normal in color and non tender to palpation. There are no bruises or abrasions noted. NEUROLOGICAL: Motor sensory exam is within normal limits. Reflexes are equal bilaterally.  There is normal sensation to pinprick and light touch  MUSCULOSKELETAL:  Examination Left knee Right knee Skin Intact Intact   Range of motion     Effusion + +   Medial joint line tenderness + +   Lateral joint line tenderness + +   Tenderness Pes Bursa + +   Tenderness insertion MCL - -   Tenderness insertion LCL - -   Marcuss - -   Patella crepitus + +   Patella grind + +   Lachman - -   Pivot shift - -   Anterior drawer - -   Posterior drawer - -   Varus stress - -   Valgus stress - -   Neurovascular Intact Intact   Calf Swelling and Tenderness to Palpation - -   Jaiden's Test - -   Hamstring Cord Tightness - -       PROCEDURE: Bilateral Knee Injection with Ultrasound Guidance  Indication:Bilateral Knee pain/swelling    After sterile prep, 4 cc of Xylocaine and 1 cc of Kenalog were injected into the bilateral  knee. Ultrasound images captured using [employer] Ultrasound machine and scanned into patient's chart.        [facility]  OFFICE PROCEDURE PROGRESS NOTE        Chart reviewed for the following:  Savage Plummer M.D, have reviewed the History, Physical and updated the Allergic reactions for [facility] [employer] performed immediately prior to start of procedure:  Savage Plummer M.D, have performed the following reviews on [facility] prior to the start of the procedure:            * Patient was identified by name and date of birth   * Agreement on procedure being performed was verified  * Risks and Benefits explained to the patient  * Procedure site verified and marked as necessary  * Patient was positioned for comfort  * Consent was signed and verified     Time: 10:30 AM     Date of procedure: 12/1/2020    Procedure performed by:  Gelacio Goel M.D    Provider assisted by: (see medication administration)    How tolerated by patient: tolerated the procedure well with no complications    Comments: none      IMAGING: XR of left knee obtained in the office dated 12/01/2020 was reviewed and read by Dr. Roberth Martin: marked degenerative changes over all 3 compartments        XR of right knee obtained in the office dated 12/01/2020 was reviewed and read by Dr. Don Zheng: marked degenerative changes over all three compartments      IMPRESSION:      ICD-10-CM ICD-9-CM    1. Primary osteoarthritis of both knees  M17.0 715.16 triamcinolone acetonide (KENALOG-40) 40 mg/mL injection 80 mg      US GUIDE INJ/ASP/ARTHRO LG JNT/BURSA      PROCEDURE AUTHORIZATION TO    2. Right knee pain, unspecified chronicity  M25.561 719.46 AMB POC XRAY, KNEE; 1/2 VIEWS   3. Left knee pain, unspecified chronicity  M25.562 719.46 AMB POC XRAY, KNEE; 1/2 VIEWS        PLAN:   1. Pt presents today with b/l knee pain due to primary OA and I would like to try cortisone injections in both knees today. Will also be proceeding with Euflexxa authorization for both knees. Risk factors include: htn, BMI>35   2. No ultrasound exam indicated today  3. Yes cortisone injection indicated today B/L KNEES US  4. No Physical/Occupational Therapy indicated today  5. No diagnostic test indicated today:   6. No durable medical equipment indicated today  7. No referral indicated today   8. No medications indicated today:   9. No Narcotic indicated today       RTC following Euflexxa auth      Scribed by Ezzard Schaumann Lori Alvine) as dictated by María Johnson MD    I, Dr. María Johnson, confirm that all documentation is accurate.     María Johnson M.D.   [facility]

## 2020-12-10 ENCOUNTER — DOCUMENTATION ONLY (OUTPATIENT)
Dept: ORTHOPEDIC SURGERY | Age: 65
End: 2020-12-10

## 2020-12-15 ENCOUNTER — OFFICE VISIT (OUTPATIENT)
Dept: ORTHOPEDIC SURGERY | Age: 65
End: 2020-12-15
Payer: MEDICAID

## 2020-12-15 VITALS
WEIGHT: 235 LBS | HEIGHT: 65 IN | OXYGEN SATURATION: 94 % | TEMPERATURE: 44.4 F | HEART RATE: 97 BPM | RESPIRATION RATE: 16 BRPM | SYSTOLIC BLOOD PRESSURE: 126 MMHG | DIASTOLIC BLOOD PRESSURE: 73 MMHG | BODY MASS INDEX: 39.15 KG/M2

## 2020-12-15 DIAGNOSIS — M17.0 PRIMARY OSTEOARTHRITIS OF BOTH KNEES: Primary | ICD-10-CM

## 2020-12-15 PROCEDURE — 20611 DRAIN/INJ JOINT/BURSA W/US: CPT | Performed by: ORTHOPAEDIC SURGERY

## 2020-12-15 NOTE — PROGRESS NOTES
Patient: Zonia Velarde                MRN: [id number]       SSN: [id number]  YOB: 1955        AGE: 63 y.o. SEX: female  Body mass index is 39.11 kg/m². PCP: Merle Palacios MD  12/15/20    Chief Complaint   Patient presents with    Knee Pain     bilateral knee pain       HISTORY:  Zonia Velarde is a 63 y.o. female who is seen for reevaluation of Bilateral knee and here for 1st injection of Euflexxa. PROCEDURE:  Under ultrasound guidance, patient's Bilateral knee, after timeout under sterile conditions, was injected with 2 cc of Euflexxa. [facility]  OFFICE PROCEDURE PROGRESS NOTE        Chart reviewed for the following:   Kevon Humphries MD, have reviewed the History, Physical and updated the Allergic reactions for [employer] performed immediately prior to start of procedure:   Kevon Humphries MD, have performed the following reviews on Zonia Velarde prior to the start of the procedure:            * Patient was identified by name and date of birth   * Agreement on procedure being performed was verified  * Risks and Benefits explained to the patient  * Procedure site verified and marked as necessary  * Patient was positioned for comfort  * Consent was signed and verified     Time: 8:09 AM       Date of procedure: 12/15/2020    Procedure performed by:  Kanwal Faustin MD    Provider assisted by: None     How tolerated by patient: tolerated the procedure well with no complications    Comments: none    IMPRESSION:     ICD-10-CM ICD-9-CM    1. Primary osteoarthritis of both knees  M17.0 715.16 sodium hyaluronate (SUPARTZ FX/EUFLEXXA/HYALGAN) 10 mg/mL injection syrg 20 mg      ARTHROCENTESIS ASPIR&/INJ MAJOR JT/BURSA W/US        PLAN:  Ms. Sofia Marsh will return in one week for her second Euflexxa injection.       Scribed by Shane Steve) as dictated by Patrice Becerril Concetta Duffy MD    I, Dr. Timothy No, confirm that all documentation is accurate.     Timothy No M.D.   [facility]

## 2020-12-22 ENCOUNTER — OFFICE VISIT (OUTPATIENT)
Dept: ORTHOPEDIC SURGERY | Age: 65
End: 2020-12-22
Payer: MEDICAID

## 2020-12-22 VITALS
HEIGHT: 65 IN | WEIGHT: 239 LBS | TEMPERATURE: 97.3 F | RESPIRATION RATE: 16 BRPM | OXYGEN SATURATION: 99 % | DIASTOLIC BLOOD PRESSURE: 91 MMHG | HEART RATE: 108 BPM | BODY MASS INDEX: 39.82 KG/M2 | SYSTOLIC BLOOD PRESSURE: 162 MMHG

## 2020-12-22 DIAGNOSIS — M17.0 PRIMARY OSTEOARTHRITIS OF BOTH KNEES: Primary | ICD-10-CM

## 2020-12-22 PROCEDURE — 20611 DRAIN/INJ JOINT/BURSA W/US: CPT | Performed by: ORTHOPAEDIC SURGERY

## 2020-12-22 NOTE — PROGRESS NOTES
Patient: Whit Patel                MRN: [id number]       SSN: [id number]  YOB: 1955        AGE: 63 y.o. SEX: female  Body mass index is 39.77 kg/m². PCP: Dulce Frenandez MD  12/22/20    Chief Complaint   Patient presents with    Knee Pain     bilateral knee pain       HISTORY:  Whit Patel is a 63 y.o. female who is seen for reevaluation of Bilateral knee and here for 2nd injection of Euflexxa. PROCEDURE:  Under ultrasound guidance, patient's Bilateral knee, after timeout under sterile conditions, was injected with 2 cc of Euflexxa. [facility]  OFFICE PROCEDURE PROGRESS NOTE        Chart reviewed for the following:   Marybeth Parks MD, have reviewed the History, Physical and updated the Allergic reactions for [employer] performed immediately prior to start of procedure:   Marybeth Parks MD, have performed the following reviews on [facility] prior to the start of the procedure:            * Patient was identified by name and date of birth   * Agreement on procedure being performed was verified  * Risks and Benefits explained to the patient  * Procedure site verified and marked as necessary  * Patient was positioned for comfort  * Consent was signed and verified     Time: 12:49 PM    Date of procedure: 12/22/2020    Procedure performed by:  Mik Zamora MD    Provider assisted by: None     How tolerated by patient: tolerated the procedure well with no complications    Comments: none    IMPRESSION:     ICD-10-CM ICD-9-CM    1. Primary osteoarthritis of both knees  M17.0 715.16 sodium hyaluronate (SUPARTZ FX/EUFLEXXA/HYALGAN) 10 mg/mL injection syrg 20 mg      ARTHROCENTESIS ASPIR&/INJ MAJOR JT/BURSA W/US        PLAN:  Ms. Mike Dixon will return in one week for her third Euflexxa injection.       Scribed by Slime Mcmahon Caro) as dictated by Antonio Cherry Hugo Vizcaino MD    I, Dr. Anabell Valladares, confirm that all documentation is accurate.     Anabell Valladares M.D.   [phone]

## 2020-12-29 ENCOUNTER — OFFICE VISIT (OUTPATIENT)
Dept: ORTHOPEDIC SURGERY | Age: 65
End: 2020-12-29
Payer: MEDICAID

## 2020-12-29 VITALS
SYSTOLIC BLOOD PRESSURE: 142 MMHG | WEIGHT: 239.6 LBS | RESPIRATION RATE: 16 BRPM | HEIGHT: 65 IN | HEART RATE: 101 BPM | BODY MASS INDEX: 39.92 KG/M2 | DIASTOLIC BLOOD PRESSURE: 72 MMHG | OXYGEN SATURATION: 98 % | TEMPERATURE: 97.2 F

## 2020-12-29 DIAGNOSIS — M17.0 PRIMARY OSTEOARTHRITIS OF BOTH KNEES: Primary | ICD-10-CM

## 2020-12-29 PROCEDURE — 20611 DRAIN/INJ JOINT/BURSA W/US: CPT | Performed by: ORTHOPAEDIC SURGERY

## 2020-12-29 NOTE — PROGRESS NOTES
Patient: Maury Rogel                MRN: [id number]       SSN: [id number]  YOB: 1955        AGE: 63 y.o. SEX: female  Body mass index is 39.87 kg/m². PCP: Guero Ramos MD  12/29/20    Chief Complaint   Patient presents with    Knee Pain     beba knee pain       HISTORY:  Maury Rogel is a 63 y.o. female who is seen for reevaluation of Bilateral knee and here for 3rd and final injection of Euflexxa. PROCEDURE:  Under ultrasound guidance, patient's Bilateral knee, after timeout under sterile conditions, was injected with 2 cc of Euflexxa. [facility]  OFFICE PROCEDURE PROGRESS NOTE        Chart reviewed for the following:   Willis Allen MD, have reviewed the History, Physical and updated the Allergic reactions for [employer] performed immediately prior to start of procedure:   Willis Allen MD, have performed the following reviews on Maury Rogel prior to the start of the procedure:            * Patient was identified by name and date of birth   * Agreement on procedure being performed was verified  * Risks and Benefits explained to the patient  * Procedure site verified and marked as necessary  * Patient was positioned for comfort  * Consent was signed and verified     Time: 9:38 AM       Date of procedure: 12/29/2020    Procedure performed by:  Anabell Valladares MD    Provider assisted by: None     How tolerated by patient: tolerated the procedure well with no complications    Comments: none    IMPRESSION:     ICD-10-CM ICD-9-CM    1. Primary osteoarthritis of both knees  M17.0 715.16 sodium hyaluronate (SUPARTZ FX/EUFLEXXA/HYALGAN) 10 mg/mL injection syrg 20 mg      ARTHROCENTESIS ASPIR&/INJ MAJOR JT/BURSA W/US        PLAN: Ms. Pedro Pablo Sousa has completed her Euflexxa injection series. she will return as needed.       Scribed by [facility] ([address] [place] [address] [address]) as dictated by Gelacio Goel MD    I, Dr. Gelacio Goel, confirm that all documentation is accurate.     Gelacio Goel M.D.   [facility]

## 2021-01-07 ENCOUNTER — TRANSCRIBE ORDER (OUTPATIENT)
Dept: SCHEDULING | Age: 66
End: 2021-01-07

## 2021-01-07 DIAGNOSIS — K74.60 CIRRHOSIS (HCC): ICD-10-CM

## 2021-01-07 DIAGNOSIS — B18.2 HEPATITIS C, CHRONIC (HCC): ICD-10-CM

## 2021-01-07 DIAGNOSIS — F17.210 MODERATE CIGARETTE SMOKER: ICD-10-CM

## 2021-01-07 DIAGNOSIS — R12 HEARTBURN: Primary | ICD-10-CM

## 2021-01-22 ENCOUNTER — HOSPITAL ENCOUNTER (OUTPATIENT)
Dept: ULTRASOUND IMAGING | Age: 66
Discharge: HOME | End: 2021-01-22
Attending: INTERNAL MEDICINE
Payer: MEDICAID

## 2021-01-22 DIAGNOSIS — K74.60 CIRRHOSIS (HCC): ICD-10-CM

## 2021-01-22 DIAGNOSIS — F17.210 MODERATE CIGARETTE SMOKER: ICD-10-CM

## 2021-01-22 DIAGNOSIS — R12 HEARTBURN: ICD-10-CM

## 2021-01-22 DIAGNOSIS — B18.2 HEPATITIS C, CHRONIC (HCC): ICD-10-CM

## 2021-01-22 PROCEDURE — 76705 ECHO EXAM OF ABDOMEN: CPT

## 2021-02-10 ENCOUNTER — HOSPITAL ENCOUNTER (EMERGENCY)
Age: 66
Discharge: HOME | End: 2021-02-10
Attending: EMERGENCY MEDICINE
Payer: MEDICAID

## 2021-02-10 ENCOUNTER — HOSPITAL ENCOUNTER (EMERGENCY)
Dept: CT IMAGING | Age: 66
Discharge: HOME | End: 2021-02-10
Attending: PHYSICIAN ASSISTANT
Payer: MEDICAID

## 2021-02-10 VITALS
WEIGHT: 238 LBS | DIASTOLIC BLOOD PRESSURE: 81 MMHG | OXYGEN SATURATION: 100 % | TEMPERATURE: 98.4 F | HEIGHT: 67 IN | HEART RATE: 89 BPM | SYSTOLIC BLOOD PRESSURE: 133 MMHG | RESPIRATION RATE: 16 BRPM | BODY MASS INDEX: 37.35 KG/M2

## 2021-02-10 DIAGNOSIS — M54.2 NECK PAIN: ICD-10-CM

## 2021-02-10 DIAGNOSIS — S60.811A ABRASION OF RIGHT WRIST, INITIAL ENCOUNTER: Primary | ICD-10-CM
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
## 2021-02-10 PROCEDURE — 90471 IMMUNIZATION ADMIN: CPT

## 2021-02-10 PROCEDURE — 72125 CT NECK SPINE W/O DYE: CPT

## 2021-02-10 PROCEDURE — 99282 EMERGENCY DEPT VISIT SF MDM: CPT

## 2021-02-10 PROCEDURE — 90715 TDAP VACCINE 7 YRS/> IM: CPT | Performed by: PHYSICIAN ASSISTANT

## 2021-02-10 PROCEDURE — 74011250636 HC RX REV CODE- 250/636: Performed by: PHYSICIAN ASSISTANT

## 2021-02-10 PROCEDURE — 70450 CT HEAD/BRAIN W/O DYE: CPT

## 2021-02-10 RX ORDER — CYCLOBENZAPRINE HCL 10 MG
10 TABLET ORAL
Qty: 12 TAB | Refills: 0 | Status: SHIPPED | OUTPATIENT
Start: 2021-02-10 | End: 2021-05-06

## 2021-02-10 RX ORDER — NAPROXEN 500 MG/1
500 TABLET ORAL 2 TIMES DAILY WITH MEALS
Qty: 20 TAB | Refills: 0 | Status: SHIPPED | OUTPATIENT
Start: 2021-02-10 | End: 2021-02-20

## 2021-02-10 RX ADMIN — TETANUS TOXOID, REDUCED DIPHTHERIA TOXOID AND ACELLULAR PERTUSSIS VACCINE, ADSORBED 0.5 ML: 5; 2.5; 8; 8; 2.5 SUSPENSION INTRAMUSCULAR at 16:46

## 2021-02-10 NOTE — Clinical Note
[facility] EMERGENCY DEPT 
[address] [facility] [address] [phone] Work/School Note Date: 2/10/2021 To Whom It May concern: 
 
Freddie Joseph was seen and treated today in the emergency room by the following provider(s): 
Attending Provider: Suyapa Connell MD 
Physician Assistant: Filemon Rae PA-C. Freddie Joseph is excused from work/school on 02/10/21 and 02/11/21. She is medically clear to return to work/school on 2/12/2021. Sincerely, Tracee Villa PA-C
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
## 2021-03-15 ENCOUNTER — TRANSCRIBE ORDER (OUTPATIENT)
Dept: SCHEDULING | Age: 66
End: 2021-03-15

## 2021-03-15 DIAGNOSIS — Z12.31 SCREENING MAMMOGRAM, ENCOUNTER FOR: Primary | ICD-10-CM

## 2021-03-19 ENCOUNTER — HOSPITAL ENCOUNTER (OUTPATIENT)
Dept: LAB | Age: 66
Discharge: HOME | End: 2021-03-19

## 2021-03-19 LAB — XX-LABCORP SPECIMEN COL,LCBCF: NORMAL

## 2021-03-19 PROCEDURE — 99001 SPECIMEN HANDLING PT-LAB: CPT

## 2021-03-22 RX ORDER — BRINZOLAMIDE/BRIMONIDINE TARTRATE 10; 2 MG/ML; MG/ML
1 SUSPENSION/ DROPS OPHTHALMIC DAILY
COMMUNITY

## 2021-03-23 ENCOUNTER — ANESTHESIA EVENT (OUTPATIENT)
Dept: ENDOSCOPY | Age: 66
End: 2021-03-23
Payer: MEDICAID

## 2021-03-24 ENCOUNTER — HOSPITAL ENCOUNTER (OUTPATIENT)
Age: 66
Setting detail: OUTPATIENT SURGERY
Discharge: HOME | End: 2021-03-24
Attending: INTERNAL MEDICINE | Admitting: INTERNAL MEDICINE
Payer: MEDICAID

## 2021-03-24 ENCOUNTER — ANESTHESIA (OUTPATIENT)
Dept: ENDOSCOPY | Age: 66
End: 2021-03-24
Payer: MEDICAID

## 2021-03-24 VITALS
BODY MASS INDEX: 38.3 KG/M2 | TEMPERATURE: 97 F | DIASTOLIC BLOOD PRESSURE: 61 MMHG | WEIGHT: 244 LBS | OXYGEN SATURATION: 97 % | RESPIRATION RATE: 16 BRPM | HEART RATE: 70 BPM | SYSTOLIC BLOOD PRESSURE: 102 MMHG | HEIGHT: 67 IN

## 2021-03-24 PROCEDURE — 00731 ANES UPR GI NDSC PX NOS: CPT | Performed by: NURSE ANESTHETIST, CERTIFIED REGISTERED

## 2021-03-24 PROCEDURE — 2709999900 HC NON-CHARGEABLE SUPPLY: Performed by: INTERNAL MEDICINE

## 2021-03-24 PROCEDURE — 76040000019: Performed by: INTERNAL MEDICINE

## 2021-03-24 PROCEDURE — 74011250636 HC RX REV CODE- 250/636: Performed by: NURSE ANESTHETIST, CERTIFIED REGISTERED

## 2021-03-24 PROCEDURE — 74011000250 HC RX REV CODE- 250: Performed by: NURSE ANESTHETIST, CERTIFIED REGISTERED

## 2021-03-24 PROCEDURE — 77030019988 HC FCPS ENDOSC DISP BSC -B: Performed by: INTERNAL MEDICINE

## 2021-03-24 PROCEDURE — 00731 ANES UPR GI NDSC PX NOS: CPT | Performed by: ANESTHESIOLOGY

## 2021-03-24 PROCEDURE — 88305 TISSUE EXAM BY PATHOLOGIST: CPT

## 2021-03-24 PROCEDURE — 76060000031 HC ANESTHESIA FIRST 0.5 HR: Performed by: INTERNAL MEDICINE

## 2021-03-24 PROCEDURE — 77030008565 HC TBNG SUC IRR ERBE -B: Performed by: INTERNAL MEDICINE

## 2021-03-24 RX ORDER — INSULIN LISPRO 100 [IU]/ML
INJECTION, SOLUTION INTRAVENOUS; SUBCUTANEOUS ONCE
Status: DISCONTINUED | OUTPATIENT
Start: 2021-03-24 | End: 2021-03-24 | Stop reason: HOSPADM

## 2021-03-24 RX ORDER — LIDOCAINE HYDROCHLORIDE 10 MG/ML
0.1 INJECTION, SOLUTION EPIDURAL; INFILTRATION; INTRACAUDAL; PERINEURAL AS NEEDED
Status: DISCONTINUED | OUTPATIENT
Start: 2021-03-24 | End: 2021-03-24 | Stop reason: HOSPADM

## 2021-03-24 RX ORDER — SODIUM CHLORIDE, SODIUM LACTATE, POTASSIUM CHLORIDE, CALCIUM CHLORIDE 600; 310; 30; 20 MG/100ML; MG/100ML; MG/100ML; MG/100ML
50 INJECTION, SOLUTION INTRAVENOUS CONTINUOUS
Status: CANCELLED | OUTPATIENT
Start: 2021-03-24

## 2021-03-24 RX ORDER — SODIUM CHLORIDE 0.9 % (FLUSH) 0.9 %
5-40 SYRINGE (ML) INJECTION EVERY 8 HOURS
Status: CANCELLED | OUTPATIENT
Start: 2021-03-24

## 2021-03-24 RX ORDER — SODIUM CHLORIDE 0.9 % (FLUSH) 0.9 %
5-40 SYRINGE (ML) INJECTION AS NEEDED
Status: DISCONTINUED | OUTPATIENT
Start: 2021-03-24 | End: 2021-03-24 | Stop reason: HOSPADM

## 2021-03-24 RX ORDER — SODIUM CHLORIDE 0.9 % (FLUSH) 0.9 %
5-40 SYRINGE (ML) INJECTION AS NEEDED
Status: CANCELLED | OUTPATIENT
Start: 2021-03-24

## 2021-03-24 RX ORDER — SODIUM CHLORIDE, SODIUM LACTATE, POTASSIUM CHLORIDE, CALCIUM CHLORIDE 600; 310; 30; 20 MG/100ML; MG/100ML; MG/100ML; MG/100ML
75 INJECTION, SOLUTION INTRAVENOUS CONTINUOUS
Status: DISCONTINUED | OUTPATIENT
Start: 2021-03-24 | End: 2021-03-24 | Stop reason: HOSPADM

## 2021-03-24 RX ORDER — LIDOCAINE HYDROCHLORIDE 20 MG/ML
INJECTION, SOLUTION EPIDURAL; INFILTRATION; INTRACAUDAL; PERINEURAL AS NEEDED
Status: DISCONTINUED | OUTPATIENT
Start: 2021-03-24 | End: 2021-03-24 | Stop reason: HOSPADM

## 2021-03-24 RX ORDER — SODIUM CHLORIDE 0.9 % (FLUSH) 0.9 %
5-40 SYRINGE (ML) INJECTION EVERY 8 HOURS
Status: DISCONTINUED | OUTPATIENT
Start: 2021-03-24 | End: 2021-03-24 | Stop reason: HOSPADM

## 2021-03-24 RX ORDER — PROPOFOL 10 MG/ML
INJECTION, EMULSION INTRAVENOUS AS NEEDED
Status: DISCONTINUED | OUTPATIENT
Start: 2021-03-24 | End: 2021-03-24 | Stop reason: HOSPADM

## 2021-03-24 RX ADMIN — PROPOFOL 40 MG: 10 INJECTION, EMULSION INTRAVENOUS at 09:14

## 2021-03-24 RX ADMIN — FAMOTIDINE 20 MG: 10 INJECTION INTRAVENOUS at 08:00

## 2021-03-24 RX ADMIN — PROPOFOL 40 MG: 10 INJECTION, EMULSION INTRAVENOUS at 09:12

## 2021-03-24 RX ADMIN — LIDOCAINE HYDROCHLORIDE 80 MG: 20 INJECTION, SOLUTION EPIDURAL; INFILTRATION; INTRACAUDAL; PERINEURAL at 09:14

## 2021-03-24 RX ADMIN — PROPOFOL 80 MG: 10 INJECTION, EMULSION INTRAVENOUS at 09:11

## 2021-03-24 RX ADMIN — SODIUM CHLORIDE, SODIUM LACTATE, POTASSIUM CHLORIDE, AND CALCIUM CHLORIDE 75 ML/HR: 600; 310; 30; 20 INJECTION, SOLUTION INTRAVENOUS at 08:00

## 2021-03-24 NOTE — DISCHARGE INSTRUCTIONS
Patient Education        Gastritis: Care Instructions  Your Care Instructions     Gastritis is a sore and upset stomach. It happens when something irritates the stomach lining. Many things can cause it. These include an infection such as the flu or something you ate or drank. Medicines or a sore on the lining of the stomach (ulcer) also can cause it. Your belly may bloat and ache. You may belch, vomit, and feel sick to your stomach. You should be able to relieve the problem by taking medicine. And it may help to change your diet. If gastritis lasts, your doctor may prescribe medicine. Follow-up care is a key part of your treatment and safety. Be sure to make and go to all appointments, and call your doctor if you are having problems. It's also a good idea to know your test results and keep a list of the medicines you take. How can you care for yourself at home? · If your doctor prescribed antibiotics, take them as directed. Do not stop taking them just because you feel better. You need to take the full course of antibiotics. · Be safe with medicines. If your doctor prescribed medicine to decrease stomach acid, take it as directed. Call your doctor if you think you are having a problem with your medicine. · Do not take any other medicine, including over-the-counter pain relievers, without talking to your doctor first.  · If your doctor recommends over-the-counter medicine to reduce stomach acid, such as Pepcid AC (famotidine), Prilosec (omeprazole), or Tagamet HB (cimetidine) follow the directions on the label. · Drink plenty of fluids (enough so that your urine is light yellow or clear like water) to prevent dehydration. Choose water and other caffeine-free clear liquids. If you have kidney, heart, or liver disease and have to limit fluids, talk with your doctor before you increase the amount of fluids you drink. · Limit how much alcohol you drink.   · Avoid coffee, tea, cola drinks, chocolate, and other foods with caffeine. They increase stomach acid. When should you call for help? Call 911 anytime you think you may need emergency care. For example, call if:    · You vomit blood or what looks like coffee grounds.     · You pass maroon or very bloody stools. Call your doctor now or seek immediate medical care if:    · You start breathing fast and have not produced urine in the last 8 hours.     · You cannot keep fluids down. Watch closely for changes in your health, and be sure to contact your doctor if:    · You do not get better as expected. Where can you learn more? Go to http://www.connolly.com/  Enter Z536 in the search box to learn more about \"Gastritis: Care Instructions. \"  Current as of: April 15, 2020               Content Version: 12.6  © 0678-9516 [employer]. Care instructions adapted under license by [employer] (which disclaims liability or warranty for this information). If you have questions about a medical condition or this instruction, always ask your healthcare professional. [address] any warranty or liability for your use of this information. Patient Education        Esophageal Dilation: What to Expect at Home  Your Recovery  After you have esophageal dilation, you will stay at the hospital or surgery center for 1 to 2 hours. This will allow the medicine to wear off. You will be able to go home after your doctor or nurse checks to make sure you are not having any problems. This care sheet gives you a general idea about how long it will take for you to recover. But each person recovers at a different pace. Follow the steps below to get better as quickly as possible. How can you care for yourself at home? Activity    · Rest as much as you need to after you go home.     · You should be able to go back to your usual activities the day after the procedure.    Diet    · Follow your doctor's directions for eating after the procedure.     · Drink plenty of fluids (unless your doctor has told you not to). Medicines    · Your doctor will tell you if and when you can restart your medicines. He or she will also give you instructions about taking any new medicines.     · If you take aspirin or some other blood thinner, ask your doctor if and when to start taking it again. Make sure that you understand exactly what your doctor wants you to do.     · If you have a sore throat the day after the procedure, use an over-the-counter spray to numb your throat. Sucking on throat lozenges and gargling with warm salt water may also help relieve your symptoms. Follow-up care is a key part of your treatment and safety. Be sure to make and go to all appointments, and call your doctor if you are having problems. It's also a good idea to know your test results and keep a list of the medicines you take. When should you call for help? Call 911 anytime you think you may need emergency care. For example, call if:    · You passed out (lost consciousness).     · You have trouble breathing.     · Your stools are maroon or very bloody   Call your doctor now or seek immediate medical care if:    · You have new or worse belly pain.     · You have a fever.     · You have new or more blood in your stools.     · You are sick to your stomach and cannot drink fluids.     · You cannot pass stools or gas.     · You have pain that does not get better after you take pain medicine. Watch closely for changes in your health, and be sure to contact your doctor if:    · Your throat still hurts after a day or two.     · You do not get better as expected. Where can you learn more? Go to http://www.gray.com/  Enter J014 in the search box to learn more about \"Esophageal Dilation: What to Expect at Home. \"  Current as of: April 15, 2020               Content Version: 12.6  © 9446-3413 [employer].    Care instructions adapted under license by [employer] (which disclaims liability or warranty for this information). If you have questions about a medical condition or this instruction, always ask your healthcare professional. [address] any warranty or liability for your use of this information. Patient Education        Learning About Swallowing Problems  What are swallowing problems? Certain health problems that affect the nervous system can cause trouble swallowing. These conditions include stroke, ALS (also known as Danielle Gehrig's disease), Parkinson's disease, and multiple sclerosis. The muscles and nerves that help move food through the throat and esophagus may not work right. Growths, such as cancer, and other problems with your esophagus can also make it hard to swallow. The esophagus is the tube that leads from your throat to your stomach. How are swallowing problems diagnosed? A doctor or speech therapist will examine you to check for swallowing problems. You may get swallowing tests to check how well your throat muscles work. For these tests, you swallow a special liquid that helps the doctor see your throat and esophagus on an X-ray or video screen. Other tests use a thin, flexible tube called a scope to check for problems with your esophagus. The doctor puts the scope in your mouth and down your throat to look at your esophagus. What are the symptoms? Symptoms of swallowing problems may include:  · Trouble getting food or liquids to go down on the first try. · Gagging, choking, or coughing when you swallow. · Having food or liquids come back up through your throat, mouth, or nose after you swallow. · Feeling like foods or liquids are stuck in some part of your throat or chest.  · Pain when you swallow. How are swallowing problems treated? How swallowing problems are treated depends on the cause.  The main goals of treatment will be to help you eat and swallow safely and get good nutrition. This is important for your health and quality of life. You may learn exercises to train your throat muscles to work together so you're able to swallow better. Learning certain ways to put food in your mouth or to position your head while eating may also help. Your doctor or a speech therapist may recommend changes to your diet to help make it easier to swallow. You may need to avoid certain foods or liquids. You also may need to change the thickness of foods or liquids in your diet. To eat and swallow safely, follow any instructions you get from your doctor or therapist. These ideas may help:  · Sit upright when eating, drinking, and taking pills. · Take small bites of food. Chew completely and swallow before taking another bite. · Take small sips of liquids. · If eating makes you tired, eat smaller but more frequent meals. · Tip your chin down when there is food in your mouth. Where can you learn more? Go to http://www.gray.com/  Enter D018 in the search box to learn more about \"Learning About Swallowing Problems. \"  Current as of: June 26, 2019               Content Version: 12.6  © 2651-3852 [employer]. Care instructions adapted under license by [employer] (which disclaims liability or warranty for this information). If you have questions about a medical condition or this instruction, always ask your healthcare professional. [place] [address] any warranty or liability for your use of this information.        .Patient armband removed and shredded

## 2021-03-24 NOTE — ANESTHESIA POSTPROCEDURE EVALUATION
Procedure(s):  UPPER ENDOSCOPY with bx's, with dilation. MAC    Anesthesia Post Evaluation      Multimodal analgesia: multimodal analgesia used between 6 hours prior to anesthesia start to PACU discharge  Patient location during evaluation: bedside  Patient participation: complete - patient cannot participate  Level of consciousness: awake  Pain score: 1  Pain management: adequate  Airway patency: patent  Anesthetic complications: no  Cardiovascular status: acceptable  Respiratory status: acceptable  Hydration status: acceptable  Post anesthesia nausea and vomiting:  none      INITIAL Post-op Vital signs:   Vitals Value Taken Time   /46 03/24/21 0933   Temp 36.5 °C (97.7 °F) 03/24/21 0923   Pulse 70 03/24/21 0940   Resp 20 03/24/21 0940   SpO2 100 % 03/24/21 0940   Vitals shown include unvalidated device data.

## 2021-03-24 NOTE — H&P
[employer]  [phone]      History and Physical    Patient: Freddie Joseph MRN: [id number]  SSN: [id number]    YOB: 1955  Age: 63 y.o. Sex: female      Subjective:      Freddie Joseph is a 63 y.o. female who presents with history of cirrhosis, here for variceal screening. Past Medical History:   Diagnosis Date    Abnormal liver enzymes 06/2018    Arthritis of knee, left 2007    Chronic obstructive pulmonary disease (HCC)     mild    Cirrhosis of liver ([address] 01/24/2018    Stage F2 fibrosis,compensated    Diverticulosis of sigmoid colon 10/2017    Mild diverticulosis    Erythrasma May 2014    beba feet, lower left leg    GERD (gastroesophageal reflux disease)     Glaucoma     H/O mammogram 03/01/2016    normal, f/u in 1 year    Heartburn 2018    Hepatitis C 2002    genotype 1a, previous IV drug user, declined interferons Completed HepC 2019    Hypertension 2002    Hypomagnesemia 11/18/2015    Migraine 40/30/3198    Nonalcoholic fatty liver disease     Obesity 08/2018    Positive FIT (fecal immunochemical test) 07/26/2017    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 concern for Necrolytic acral erythema    Tobacco use     working with [facility] smoking cessation      Past Surgical History:   Procedure Laterality Date    COLONOSCOPY N/A 10/11/2017    COLONOSCOPY performed by Jaspal Mello MD at [address] HX CHOLECYSTECTOMY  2006    laparoscopic    HX COLONOSCOPY  2012    hx of polyps, COLLE[facility]ZARO SANJA[facility]    HX COLONOSCOPY  10/2017     Dr. Aisha Quinonez recommends your next colonoscopy in 5 years.     HX OTHER SURGICAL  2002    liver biopsy    HX PREMALIG/BENIGN SKIN LESION EXCISION  07/24/2019    right ear cyst removed    HX TUBAL LIGATION        Family History   Problem Relation Age of Onset    Hypertension Mother     COPD Mother     Other Mother         Poly? nervous system    Cancer Father         rare form bone  Colon Polyps Sister     Colon Polyps Maternal Aunt      Social History     Tobacco Use    Smoking status: Current Every Day Smoker     Packs/day: 0.50     Types: Cigarettes     Start date: 1/12/1974    Smokeless tobacco: Never Used   Substance Use Topics    Alcohol use: No      Prior to Admission medications    Medication Sig Start Date End Date Taking? Authorizing Provider   brinzolamide-brimonidine (Simbrinza) 1-0.2 % drps Administer 1 Drop to both eyes daily. Yes Provider, Historical   cyclobenzaprine (FLEXERIL) 10 mg tablet Take 1 Tab by mouth three (3) times daily as needed for Muscle Spasm(s). 2/10/21  Yes Josiah Warner PA-C   cetirizine (ZYRTEC) 10 mg tablet Take 10 mg by mouth nightly. 11/16/20  Yes Provider, Historical   Lumigan 0.01 % ophthalmic drops Administer 1 Drop to both eyes nightly. 9/22/20  Yes Provider, Historical   meclizine (ANTIVERT) 25 mg tablet Take 1 tablet by mouth every 6 hours for the next 3 days. Then stop taking the meclizine. Restart the meclizine for 3 day intervals if vertigo/ dizziness returns. 10/13/20  Yes Kendall Coyne MD   olmesartan-hydroCHLOROthiazide [facility] HCT) 40-25 mg per tablet Take 1 Tab by mouth daily. Indications: high blood pressure 4/16/19  Yes Lila Alexandra NP   verapamil (CALAN) 120 mg tablet TAKE ONE TABLET BY MOUTH TWICE A DAY 4/16/19  Yes Maria Elena CARRERA NP   albuterol (PROAIR HFA) 90 mcg/actuation inhaler Take 1-2 Puffs by inhalation every four (4) hours as needed for Wheezing. 4/16/19  Yes Maria Elena CARRERA NP   fluticasone propion-salmeterol (ADVAIR) 100-50 mcg/dose diskus inhaler Take 1 Puff by inhalation every twelve (12) hours. 4/16/19  Yes Sandra Chan NP   ergocalciferol (VITAMIN D2) 50,000 unit capsule TAKE ONE CAPSULE BY MOUTH EVERY 7 DAYS 4/16/19  Yes Maria Elena CARRERA NP   ondansetron hcl (Zofran) 4 mg tablet Take 1 Tab by mouth every eight (8) hours as needed for Nausea.  10/13/20   Kendall Coyne, MD   ondansetron (Zofran ODT) 8 mg disintegrating tablet Take 1 Tab by mouth every eight (8) hours as needed for Nausea or Vomiting. 6/23/20   PINA Diane        Allergies   Allergen Reactions    Compazine [Prochlorperazine] Hives       Review of Systems:  A comprehensive review of systems was negative except for that written in the History of Present Illness. Objective:     Vitals:    03/22/21 1002   Weight: 108 kg (238 lb)   Height: 5' 7\" (1.702 m)        Physical Exam:  GENERAL: alert, cooperative, no distress, appears stated age  LUNG: clear to auscultation bilaterally  HEART: regular rate and rhythm, S1, S2 normal, no murmur, click, rub or gallop  ABDOMEN: soft, non-tender. Bowel sounds normal. No masses,  no organomegaly  NEUROLOGIC: alert & oriented x 3    Assessment:     1. Cirrhosis  2. Variceal screening    Plan:     1. EGD    Signed By: Durga James MD     March 24, 2021      Durga James MD  [facility] [place] [id number], [address] [place] [address]  cell - [phone]  [email or web address]

## 2021-03-24 NOTE — ANESTHESIA PREPROCEDURE EVALUATION
Relevant Problems   No relevant active problems       Anesthetic History               Review of Systems / Medical History      Pulmonary    COPD               Neuro/Psych              Cardiovascular    Hypertension                   GI/Hepatic/Renal     GERD      Liver disease     Endo/Other        Morbid obesity and arthritis     Other Findings   Comments: Hypertension  Erythrasma  Tobacco use  Arthritis of knee, left  Glaucoma  H/O mammogram  Positive FIT (fecal immunochemical test)  Chronic obstructive pulmonary disease (HCC)  Diverticulosis of sigmoid colon  Psoriasiform dermatitis  Abnormal liver enzymes  Heartburn  Obesity  Migraine  Hypomagnesemia  Hepatitis C  Cirrhosis of liver (HCC)  Nonalcoholic fatty liver disease  GERD (gastroesophageal reflux disease)           Physical Exam    Airway  Mallampati: II  TM Distance: 4 - 6 cm  Neck ROM: normal range of motion   Mouth opening: Normal     Cardiovascular  Regular rate and rhythm,  S1 and S2 normal,  no murmur, click, rub, or gallop  Rhythm: regular  Rate: normal         Dental  No notable dental hx       Pulmonary  Breath sounds clear to auscultation               Abdominal  GI exam deferred       Other Findings            Anesthetic Plan    ASA: 3  Anesthesia type: MAC          Induction: Intravenous  Anesthetic plan and risks discussed with: Patient      I have informed the patient or their guardian of the nature and purpose of the type of anesthesia, the reasonable alternative anesthetic methods, pertinent foreseeable risks involved and the possibility of complications. I have explained that an alternative form of anesthesia may be required by unexpected conditions arising before or during the procedure. It is understood that general anesthesia may be required for safety or comfort. Questions have been answered to the satisfaction of the patient who accepts the risks and agrees to proceed as planned.  The above anesthetic review of medical history, physical exam, tests, assessment, subsequent anesthetic plan and consent have been accomplished pre-procedure.

## 2021-03-24 NOTE — PROCEDURES
[employer]  [phone]        Brief Procedure Note    Maya Spence  1955  [id number]    Date of Procedure: 3/24/2021    Preoperative diagnosis: Cirrhosis:  571.5 - K74.60  Hepatitis C, chronic:   070.54 - B18.2  Heartburn:   787.1 - R12    Postoperative diagnosis: antrum and body bx's R/O H. pylori, gastritis, duodenal erosions, non obstructive dysphagia dilated with 48 fr diallo, no varices    Description of Findings: same    Sedation/Anesthesia: Monitored Anesthesia Care; See Anesthesia Note    Procedure: Procedure(s):  UPPER ENDOSCOPY with bx's, with dilation    :  Dr. Winsome Bran MD    Assistant(s): Endoscopy Technician-1: Jose Youngblood  Endoscopy RN-1: Pal Mohan RN  Float Staff: Yamila La RN    EBL:None    Specimens:   ID Type Source Tests Collected by Time Destination   1 : antrum and body bx's Preservative Gastric  Blanca Cruz MD 3/24/2021 9101 Pathology       Findings: See printed and scanned procedure note    Complications: None    Tissue Implant Device: None    Dr. Winsome Bran MD  3/24/2021  9:24 AM    Winsome Bran MD  [facility] [address] - [phone]  [facility] [phone]  [email or web address]

## 2021-05-06 ENCOUNTER — HOSPITAL ENCOUNTER (EMERGENCY)
Age: 66
Discharge: HOME | End: 2021-05-06
Attending: EMERGENCY MEDICINE
Payer: MEDICAID

## 2021-05-06 ENCOUNTER — APPOINTMENT (OUTPATIENT)
Dept: CT IMAGING | Age: 66
End: 2021-05-06
Attending: PHYSICIAN ASSISTANT
Payer: MEDICAID

## 2021-05-06 VITALS
TEMPERATURE: 97.9 F | DIASTOLIC BLOOD PRESSURE: 92 MMHG | SYSTOLIC BLOOD PRESSURE: 134 MMHG | HEART RATE: 93 BPM | RESPIRATION RATE: 18 BRPM | HEIGHT: 67 IN | BODY MASS INDEX: 37.67 KG/M2 | WEIGHT: 240 LBS | OXYGEN SATURATION: 100 %
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
## 2021-05-06 DIAGNOSIS — S16.1XXA STRAIN OF NECK MUSCLE, INITIAL ENCOUNTER: ICD-10-CM

## 2021-05-06 DIAGNOSIS — R51.9 NONINTRACTABLE EPISODIC HEADACHE, UNSPECIFIED HEADACHE TYPE: ICD-10-CM

## 2021-05-06 DIAGNOSIS — S39.012A STRAIN OF LUMBAR REGION, INITIAL ENCOUNTER: ICD-10-CM

## 2021-05-06 PROCEDURE — 99282 EMERGENCY DEPT VISIT SF MDM: CPT

## 2021-05-06 PROCEDURE — 70450 CT HEAD/BRAIN W/O DYE: CPT

## 2021-05-06 PROCEDURE — 72125 CT NECK SPINE W/O DYE: CPT

## 2021-05-06 RX ORDER — CYCLOBENZAPRINE HCL 5 MG
5 TABLET ORAL 2 TIMES DAILY
Qty: 8 TAB | Refills: 0 | Status: SHIPPED | OUTPATIENT
Start: 2021-05-06 | End: 2022-08-09

## 2021-05-06 RX ORDER — LIDOCAINE 50 MG/G
PATCH TOPICAL
Qty: 30 EACH | Refills: 0 | Status: SHIPPED | OUTPATIENT
Start: 2021-05-06 | End: 2022-06-02

## 2021-05-06 RX ORDER — ACETAMINOPHEN 500 MG
1000 TABLET ORAL
Qty: 20 TAB | Refills: 0 | Status: SHIPPED | OUTPATIENT
Start: 2021-05-06

## 2021-05-06 NOTE — Clinical Note
[address] [place] [address] Dr [facility] EMERGENCY DEPT 
[address] [facility] [id number] [phone] Work/School Note Date: 5/6/2021 To Whom It May concern: 
 
Arnav Serna was seen and treated today in the emergency room by the following provider(s): 
Attending Provider: Owen Mcadams MD 
Physician Assistant: Tamera Aase, [place]. Arnav Serna is excused from work/school on 05/06/21 and 05/07/21. She is medically clear to return to work/school on 5/8/2021.   
 
 
Sincerely, 
 
 
 
 
PINA Mcnally
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
## 2021-05-14 ENCOUNTER — HOSPITAL ENCOUNTER (OUTPATIENT)
Dept: MAMMOGRAPHY | Age: 66
Discharge: HOME | End: 2021-05-14
Attending: STUDENT IN AN ORGANIZED HEALTH CARE EDUCATION/TRAINING PROGRAM
Payer: MEDICAID

## 2021-05-14 DIAGNOSIS — Z12.31 SCREENING MAMMOGRAM, ENCOUNTER FOR: ICD-10-CM

## 2021-05-14 PROCEDURE — 77063 BREAST TOMOSYNTHESIS BI: CPT

## 2021-05-25 ENCOUNTER — OFFICE VISIT (OUTPATIENT)
Dept: ORTHOPEDIC SURGERY | Age: 66
End: 2021-05-25
Payer: MEDICAID

## 2021-05-25 VITALS
OXYGEN SATURATION: 95 % | WEIGHT: 244 LBS | BODY MASS INDEX: 38.3 KG/M2 | RESPIRATION RATE: 16 BRPM | HEIGHT: 67 IN | HEART RATE: 104 BPM

## 2021-05-25 DIAGNOSIS — M17.0 PRIMARY OSTEOARTHRITIS OF BOTH KNEES: Primary | ICD-10-CM

## 2021-05-25 PROCEDURE — 20611 DRAIN/INJ JOINT/BURSA W/US: CPT | Performed by: ORTHOPAEDIC SURGERY

## 2021-05-25 PROCEDURE — 99214 OFFICE O/P EST MOD 30 MIN: CPT | Performed by: ORTHOPAEDIC SURGERY

## 2021-05-25 RX ORDER — TRIAMCINOLONE ACETONIDE 40 MG/ML
80 INJECTION, SUSPENSION INTRA-ARTICULAR; INTRAMUSCULAR ONCE
Status: COMPLETED | OUTPATIENT
Start: 2021-05-25 | End: 2021-05-25

## 2021-05-25 RX ADMIN — TRIAMCINOLONE ACETONIDE 80 MG: 40 INJECTION, SUSPENSION INTRA-ARTICULAR; INTRAMUSCULAR at 13:46

## 2021-05-25 NOTE — PROGRESS NOTES
Georges Black  1955   Chief Complaint   Patient presents with    Knee Pain     bilateral knee pain L>R        HISTORY OF PRESENT ILLNESS  Georges Black is a 63 y.o. female who presents today for reevaluation of b/l knee. Patient rates pain as 9/10 today. She finished a series of Euflexxa injections for both knees on 12/29/2020. Last b/l knee cortisone injection was on 12/01/2020. Has had increasing pain over the last week. She is requesting cortisone injections today. Patient was also seen here in July 2018 and finished a series of Synvisc injections. Patient denies any fever, chills, chest pain, shortness of breath or calf pain. The remainder of the review of systems is negative. There are no new illness or injuries to report since last seen in the office. There are no changes to medications, allergies, family or social history. Pain Assessment  5/25/2021   Location of Pain Knee   Location Modifiers Right;Left   Severity of Pain 9   Quality of Pain Aching; Other (Comment)   Quality of Pain Comment swelling   Duration of Pain Persistent   Frequency of Pain Constant   Aggravating Factors Walking   Limiting Behavior Yes   Relieving Factors Rest   Relieving Factors Comment -   Result of Injury -         PHYSICAL EXAM:   Visit Vitals  Pulse (!) 104   Resp 16   Ht 5' 7\" (1.702 m)   Wt 244 lb (110.7 kg)   SpO2 95%   BMI 38.22 kg/m²     The patient is a well-developed, well-nourished female   in no acute distress. The patient is alert and oriented times three. The patient is alert and oriented times three. Mood and affect are normal.  LYMPHATIC: lymph nodes are not enlarged and are within normal limits  SKIN: normal in color and non tender to palpation. There are no bruises or abrasions noted. NEUROLOGICAL: Motor sensory exam is within normal limits. Reflexes are equal bilaterally.  There is normal sensation to pinprick and light touch  MUSCULOSKELETAL:  Examination Left knee Right knee Skin Intact Intact   Range of motion     Effusion + +   Medial joint line tenderness + +   Lateral joint line tenderness + +   Tenderness Pes Bursa + +   Tenderness insertion MCL - -   Tenderness insertion LCL - -   Marcuss - -   Patella crepitus + +   Patella grind + +   Lachman - -   Pivot shift - -   Anterior drawer - -   Posterior drawer - -   Varus stress - -   Valgus stress - -   Neurovascular Intact Intact   Calf Swelling and Tenderness to Palpation - -   Jaiden's Test - -   Hamstring Cord Tightness - -       PROCEDURE:  Bilateral Knee Injection with Ultrasound Guidance    Indication:Bilateral Knee pain/swelling    After sterile prep, 4 cc of Xylocaine and 1 cc of Kenalog were injected into the bilateral  Knee. Intra-articular Ultrasound images captured using [employer] Ultrasound machine using a frequency of 10 MHz with a linear transducer and scanned into patient's chart.            [facility]  OFFICE PROCEDURE PROGRESS NOTE        Chart reviewed for the following:  Charity Rosales M.D, have reviewed the History, Physical and updated the Allergic reactions for [address] [place] [address] performed immediately prior to start of procedure:  Charity Rosales M.D, have performed the following reviews on [facility] prior to the start of the procedure:            * Patient was identified by name and date of birth   * Agreement on procedure being performed was verified  * Risks and Benefits explained to the patient  * Procedure site verified and marked as necessary  * Patient was positioned for comfort  * Needle placement confirmed by ultrasound  * Consent was signed and verified     Time: 1:53 PM     Date of procedure: 5/25/2021    Procedure performed by:  Joy Fontanez M.D    Provider assisted by: (see medication administration)    How tolerated by patient: tolerated the procedure well with no complications    Comments: none        IMAGING: XR of left knee obtained in the office dated 12/01/2020 was reviewed and read by Dr. Brody Walsh: marked degenerative changes over all 3 compartments        XR of right knee obtained in the office dated 12/01/2020 was reviewed and read by Dr. Brody Walsh: marked degenerative changes over all three compartments      IMPRESSION:      ICD-10-CM ICD-9-CM    1. Primary osteoarthritis of both knees  M17.0 715.16 triamcinolone acetonide (KENALOG-40) 40 mg/mL injection 80 mg      ARTHROCENTESIS ASPIR&/INJ MAJOR JT/BURSA W/US        PLAN:   1. Pt presents today with b/l knee pain due to primary OA and I would like to try injecting both knees with cortisone. Risk factors include: htn, BMI>35   2. No ultrasound exam indicated today  3. Yes cortisone injection indicated today B/L KNEE US  4. No Physical/Occupational Therapy indicated today  5. No diagnostic test indicated today:   6. No durable medical equipment indicated today  7. No referral indicated today   8. No medications indicated today:   9. No Narcotic indicated today       RTC 3 weeks if pain continues      Scribed by Camryn Fermin [address]) as dictated by Ruthie Davis MD    I, Dr. Ruthie Davis, confirm that all documentation is accurate.     Ruthie Davis M.D.   [facility]

## 2021-06-04 ENCOUNTER — TRANSCRIBE ORDER (OUTPATIENT)
Dept: SCHEDULING | Age: 66
End: 2021-06-04

## 2021-06-04 DIAGNOSIS — Z86.010 PERSONAL HISTORY OF COLONIC POLYPS: ICD-10-CM

## 2021-06-04 DIAGNOSIS — F17.200 TOBACCO USE DISORDER: ICD-10-CM

## 2021-06-04 DIAGNOSIS — K74.00 HEPATIC FIBROSIS: ICD-10-CM

## 2021-06-04 DIAGNOSIS — E66.9 OBESITY, UNSPECIFIED: Primary | ICD-10-CM

## 2021-06-04 DIAGNOSIS — K29.70 IRRITANT GASTRITIS: ICD-10-CM

## 2021-06-04 DIAGNOSIS — K20.90 GASTROESOPHAGITIS: ICD-10-CM

## 2021-06-04 DIAGNOSIS — R03.0 ELEVATED BLOOD PRESSURE READING WITHOUT DIAGNOSIS OF HYPERTENSION: ICD-10-CM

## 2021-06-04 DIAGNOSIS — K29.70 GASTROESOPHAGITIS: ICD-10-CM

## 2021-06-04 DIAGNOSIS — B18.2 CHRONIC HEPATITIS C WITH HEPATIC COMA (HCC): ICD-10-CM

## 2021-06-09 ENCOUNTER — HOSPITAL ENCOUNTER (OUTPATIENT)
Dept: ULTRASOUND IMAGING | Age: 66
Discharge: HOME | End: 2021-06-09
Attending: NURSE PRACTITIONER
Payer: MEDICAID

## 2021-06-09 DIAGNOSIS — K29.70 IRRITANT GASTRITIS: ICD-10-CM

## 2021-06-09 DIAGNOSIS — E66.9 OBESITY, UNSPECIFIED: ICD-10-CM

## 2021-06-09 DIAGNOSIS — F17.200 TOBACCO USE DISORDER: ICD-10-CM

## 2021-06-09 DIAGNOSIS — K74.00 HEPATIC FIBROSIS: ICD-10-CM

## 2021-06-09 DIAGNOSIS — R03.0 ELEVATED BLOOD PRESSURE READING WITHOUT DIAGNOSIS OF HYPERTENSION: ICD-10-CM

## 2021-06-09 DIAGNOSIS — Z86.010 PERSONAL HISTORY OF COLONIC POLYPS: ICD-10-CM

## 2021-06-09 DIAGNOSIS — B18.2 CHRONIC HEPATITIS C WITH HEPATIC COMA (HCC): ICD-10-CM

## 2021-06-09 PROCEDURE — 76705 ECHO EXAM OF ABDOMEN: CPT

## 2021-06-10 ENCOUNTER — OFFICE VISIT (OUTPATIENT)
Dept: ORTHOPEDIC SURGERY | Age: 66
End: 2021-06-10
Payer: MEDICAID

## 2021-06-10 VITALS
HEIGHT: 67 IN | OXYGEN SATURATION: 98 % | BODY MASS INDEX: 38.14 KG/M2 | TEMPERATURE: 96.9 F | WEIGHT: 243 LBS | HEART RATE: 84 BPM

## 2021-06-10 DIAGNOSIS — I87.8 VENOUS STASIS: ICD-10-CM

## 2021-06-10 DIAGNOSIS — G89.29 CHRONIC PAIN OF LEFT KNEE: ICD-10-CM

## 2021-06-10 DIAGNOSIS — M17.12 PRIMARY OSTEOARTHRITIS OF LEFT KNEE: ICD-10-CM

## 2021-06-10 DIAGNOSIS — M25.562 CHRONIC PAIN OF LEFT KNEE: ICD-10-CM

## 2021-06-10 DIAGNOSIS — M17.0 PRIMARY OSTEOARTHRITIS OF BOTH KNEES: Primary | ICD-10-CM

## 2021-06-10 PROCEDURE — 99214 OFFICE O/P EST MOD 30 MIN: CPT | Performed by: ORTHOPAEDIC SURGERY

## 2021-06-10 PROCEDURE — 73564 X-RAY EXAM KNEE 4 OR MORE: CPT | Performed by: ORTHOPAEDIC SURGERY

## 2021-06-10 RX ORDER — MELOXICAM 15 MG/1
15 TABLET ORAL DAILY
Qty: 30 TABLET | Refills: 1 | Status: SHIPPED | OUTPATIENT
Start: 2021-06-10 | End: 2022-08-11

## 2021-06-10 NOTE — PROGRESS NOTES
Patient: Ambar Beckford                MRN: [id number]       SSN: [id number]  YOB: 1955        AGE: 63 y.o. SEX: female  Body mass index is 38.06 kg/m². PCP: Meaghan Clark MD  06/10/21    Presents today with bilateral knee pain a little worse on the left and on the right she has had injections which only lasted a few weeks the worst problem has stairs night pain is also a feature Dr. David Mooney did some injections that helped transiently.     Examination today very nice lady describes moderate nonradicular pain her BMI is right at 38 she has a positive movie theater sign and difficulty with a step the hips rotate nicely both feet warm and well-perfused she does have some venous stasis on the left side I will get a vascular opinion the right knee the skin is normal and the each knee has good motion patellofemoral crepitus with terminal extension missing about 3 degrees of extension    Slight effusion no evidence for DVT slight neuropathy x-rays today on 9 6/10/2021 AP tunnel lateral skyline of both knees confirms severe patellofemoral arthritis bilaterally additionally I think she may have avascular necrosis involving the medial femoral condyle would recommend an MRI for the left knee I do think she is heading eventually towards total knee replacement and we did discuss about risks and benefits she is can return to see us after vascular consultation and also MRI left knee thank you there was a discussion regarding surgery and its really when she wants hopefully we can pursue nonoperative a little bit further but the MRI will be very helpful    REVIEW OF SYSTEMS:      CON: negative  EYE: negative   ENT: negative  RESP: negative  GI:    negative   :  negative  MSK: Positive  A twelve point review of systems was completed, positives noted and all other systems were reviewed and are negative          Past Medical History:   Diagnosis Date    Abnormal liver enzymes 06/2018    Arthritis of knee, left 2007    Chronic obstructive pulmonary disease (HCC)     mild    Cirrhosis of liver ([facility] [address] 01/24/2018    Stage F2 fibrosis,compensated    Diverticulosis of sigmoid colon 10/2017    Mild diverticulosis    Erythrasma May 2014    beba feet, lower left leg    GERD (gastroesophageal reflux disease)     Glaucoma     H/O mammogram 03/01/2016    normal, f/u in 1 year    Heartburn 2018    Hepatitis C 2002    genotype 1a, previous IV drug user, declined interferons Completed HepC 2019    Hypertension 2002    Hypomagnesemia 11/18/2015    Migraine 37/02/0938    Nonalcoholic fatty liver disease     Obesity 08/2018    Positive FIT (fecal immunochemical test) 07/26/2017    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 concern for Necrolytic acral erythema    Tobacco use     working with [facility] smoking cessation        Family History   Problem Relation Age of Onset    Hypertension Mother     COPD Mother     Other Mother         Poly? nervous system    Cancer Father         rare form bone     Colon Polyps Sister     Colon Polyps Maternal Aunt        Current Outpatient Medications   Medication Sig Dispense Refill    acetaminophen (Tylenol Extra Strength) 500 mg tablet Take 2 Tabs by mouth every six (6) hours as needed for Pain. 20 Tab 0    lidocaine (Lidoderm) 5 % Apply patch to the affected area for 12 hours a day and remove for 12 hours a day. 30 Each 0    brinzolamide-brimonidine (Simbrinza) 1-0.2 % drps Administer 1 Drop to both eyes daily.  cetirizine (ZYRTEC) 10 mg tablet Take 10 mg by mouth nightly.  Lumigan 0.01 % ophthalmic drops Administer 1 Drop to both eyes nightly.  meclizine (ANTIVERT) 25 mg tablet Take 1 tablet by mouth every 6 hours for the next 3 days. Then stop taking the meclizine. Restart the meclizine for 3 day intervals if vertigo/ dizziness returns.  20 Tab 0    olmesartan-hydroCHLOROthiazide (Kaleta Sizer HCT) 40-25 mg per tablet Take 1 Tab by mouth daily. Indications: high blood pressure 30 Tab 6    verapamil (CALAN) 120 mg tablet TAKE ONE TABLET BY MOUTH TWICE A DAY 60 Tab 6    albuterol (PROAIR HFA) 90 mcg/actuation inhaler Take 1-2 Puffs by inhalation every four (4) hours as needed for Wheezing. 1 Inhaler 6    fluticasone propion-salmeterol (ADVAIR) 100-50 mcg/dose diskus inhaler Take 1 Puff by inhalation every twelve (12) hours. 1 Inhaler 6    ergocalciferol (VITAMIN D2) 50,000 unit capsule TAKE ONE CAPSULE BY MOUTH EVERY 7 DAYS 4 Cap 11    cyclobenzaprine (FLEXERIL) 5 mg tablet Take 1 Tab by mouth two (2) times a day. (Patient not taking: Reported on 6/10/2021) 8 Tab 0       Allergies   Allergen Reactions    Compazine [Prochlorperazine] Hives       Past Surgical History:   Procedure Laterality Date    COLONOSCOPY N/A 10/11/2017    COLONOSCOPY performed by Jamie Cao MD at [address] HX CHOLECYSTECTOMY  2006    laparoscopic    HX COLONOSCOPY  2012    hx of polyps, [place]    HX COLONOSCOPY  10/2017     Dr. Candace Diamond recommends your next colonoscopy in 5 years.     HX OTHER SURGICAL  2002    liver biopsy    HX PREMALIG/BENIGN SKIN LESION EXCISION  07/24/2019    right ear cyst removed    HX TUBAL LIGATION         Social History     Socioeconomic History    Marital status:      Spouse name: Not on file    Number of children: 2    Years of education: Not on file    Highest education level: Not on file   Occupational History     Employer: [employer]    Tobacco Use    Smoking status: Current Every Day Smoker     Packs/day: 0.50     Types: Cigarettes     Start date: 1/12/1974    Smokeless tobacco: Never Used   Vaping Use    Vaping Use: Never used   Substance and Sexual Activity    Alcohol use: No    Drug use: Yes     Comment: IV crack cocaine, in remission since 2002    Sexual activity: Yes     Partners: Male   Other Topics Concern     Service No  Blood Transfusions No    Caffeine Concern No    Occupational Exposure No    Hobby Hazards No    Sleep Concern No    Stress Concern No    Weight Concern Yes     Comment: want bypass    Special Diet No    Back Care No    Exercise No    Bike Helmet No    Seat Belt No    Self-Exams No   Social History Narrative    Not on file     Social Determinants of Health     Financial Resource Strain:     Difficulty of Paying Living Expenses:    Food Insecurity:     Worried About Running Out of Food in the Last Year:     [address] in the Last Year:    Transportation Needs:     Lack of Transportation (Medical):  Lack of Transportation (Non-Medical):    Physical Activity:     Days of Exercise per Week:     Minutes of Exercise per Session:    Stress:     Feeling of Stress :    Social Connections:     Frequency of Communication with Friends and Family:     Frequency of Social Gatherings with Friends and Family:     Attends Sabianist Services:     Active Member of Clubs or Organizations:     Attends Club or Organization Meetings:     Marital Status:    Intimate Partner Violence:     Fear of Current or Ex-Partner:     Emotionally Abused:     Physically Abused:     Sexually Abused:        Visit Vitals  Pulse 84   Temp 96.9 °F (36.1 °C) (Temporal)   Ht 5' 7\" (1.702 m)   Wt 110.2 kg (243 lb)   SpO2 98%   BMI 38.06 kg/m²         PHYSICAL EXAMINATION:  GENERAL: Alert and oriented x3, in no acute distress, well-developed, well-nourished, afebrile. HEART: No JVD. EYES: No scleral icterus   NECK: No significant lymphadenopathy   LUNGS: No respiratory compromise or indrawing  ABDOMEN: Soft, non-tender, non-distended. Electronically signed by:  Alcira Harper MD

## 2021-06-10 NOTE — LETTER
6/10/2021 8:50 AM 
 
Ms. Tad Shrestha Heirstraat [address] [place] [id number] To Whom It May Concern: 
 
Tad Shrestha is currently under the care of [address] [place] [address]. Due to Ms. Jorge Alberto Stokes' medical condition, ground floor living accommodations are advised. If there are questions or concerns please have the patient contact our office. Sincerely, Joseph Del Cid MD

## 2021-06-17 DIAGNOSIS — M17.12 PRIMARY OSTEOARTHRITIS OF LEFT KNEE: ICD-10-CM

## 2021-06-17 DIAGNOSIS — M25.562 CHRONIC PAIN OF LEFT KNEE: ICD-10-CM

## 2021-06-17 DIAGNOSIS — G89.29 CHRONIC PAIN OF LEFT KNEE: ICD-10-CM

## 2021-07-09 ENCOUNTER — HOSPITAL ENCOUNTER (OUTPATIENT)
Dept: MRI IMAGING | Age: 66
Discharge: HOME | End: 2021-07-09
Attending: ORTHOPAEDIC SURGERY
Payer: MEDICAID

## 2021-07-09 PROCEDURE — 73721 MRI JNT OF LWR EXTRE W/O DYE: CPT

## 2021-07-23 ENCOUNTER — HOSPITAL ENCOUNTER (EMERGENCY)
Age: 66
Discharge: HOME | End: 2021-07-23
Attending: EMERGENCY MEDICINE
Payer: MEDICAID

## 2021-07-23 VITALS
HEART RATE: 82 BPM | DIASTOLIC BLOOD PRESSURE: 97 MMHG | SYSTOLIC BLOOD PRESSURE: 156 MMHG | OXYGEN SATURATION: 96 % | TEMPERATURE: 98.3 F | RESPIRATION RATE: 16 BRPM

## 2021-07-23 DIAGNOSIS — E86.0 DEHYDRATION: ICD-10-CM

## 2021-07-23 DIAGNOSIS — T50.B95A ADVERSE EFFECT OF COVID-19 VACCINE: Primary | ICD-10-CM

## 2021-07-23 LAB
ANION GAP SERPL CALC-SCNC: 6 MMOL/L (ref 3–18)
APPEARANCE UR: CLEAR
BASOPHILS # BLD: 0 K/UL (ref 0–0.1)
BASOPHILS NFR BLD: 0 % (ref 0–2)
BILIRUB UR QL: NEGATIVE
BUN SERPL-MCNC: 16 MG/DL (ref 7–18)
BUN/CREAT SERPL: 18 (ref 12–20)
CALCIUM SERPL-MCNC: 9.2 MG/DL (ref 8.5–10.1)
CHLORIDE SERPL-SCNC: 111 MMOL/L (ref 100–111)
CO2 SERPL-SCNC: 24 MMOL/L (ref 21–32)
COLOR UR: YELLOW
CREAT SERPL-MCNC: 0.88 MG/DL (ref 0.6–1.3)
DIFFERENTIAL METHOD BLD: NORMAL
EOSINOPHIL # BLD: 0.3 K/UL (ref 0–0.4)
EOSINOPHIL NFR BLD: 4 % (ref 0–5)
ERYTHROCYTE [DISTWIDTH] IN BLOOD BY AUTOMATED COUNT: 12.7 % (ref 11.6–14.5)
GLUCOSE SERPL-MCNC: 94 MG/DL (ref 74–99)
GLUCOSE UR STRIP.AUTO-MCNC: NEGATIVE MG/DL
HCT VFR BLD AUTO: 40.2 % (ref 35–45)
HGB BLD-MCNC: 13.8 G/DL (ref 12–16)
HGB UR QL STRIP: NEGATIVE
KETONES UR QL STRIP.AUTO: NEGATIVE MG/DL
LEUKOCYTE ESTERASE UR QL STRIP.AUTO: NEGATIVE
LYMPHOCYTES # BLD: 1.9 K/UL (ref 0.9–3.6)
LYMPHOCYTES NFR BLD: 29 % (ref 21–52)
MCH RBC QN AUTO: 30.7 PG (ref 24–34)
MCHC RBC AUTO-ENTMCNC: 34.3 G/DL (ref 31–37)
MCV RBC AUTO: 89.3 FL (ref 74–97)
MONOCYTES # BLD: 0.4 K/UL (ref 0.05–1.2)
MONOCYTES NFR BLD: 7 % (ref 3–10)
NEUTS SEG # BLD: 4 K/UL (ref 1.8–8)
NEUTS SEG NFR BLD: 60 % (ref 40–73)
NITRITE UR QL STRIP.AUTO: NEGATIVE
PH UR STRIP: 6.5 [PH] (ref 5–8)
PLATELET # BLD AUTO: 200 K/UL (ref 135–420)
PMV BLD AUTO: 10.1 FL (ref 9.2–11.8)
POTASSIUM SERPL-SCNC: 3.9 MMOL/L (ref 3.5–5.5)
PROT UR STRIP-MCNC: NEGATIVE MG/DL
RBC # BLD AUTO: 4.5 M/UL (ref 4.2–5.3)
SODIUM SERPL-SCNC: 141 MMOL/L (ref 136–145)
SP GR UR REFRACTOMETRY: 1.01 (ref 1–1.03)
UROBILINOGEN UR QL STRIP.AUTO: 1 EU/DL (ref 0.2–1)
WBC # BLD AUTO: 6.6 K/UL (ref 4.6–13.2)

## 2021-07-23 PROCEDURE — 80048 BASIC METABOLIC PNL TOTAL CA: CPT

## 2021-07-23 PROCEDURE — 99282 EMERGENCY DEPT VISIT SF MDM: CPT

## 2021-07-23 PROCEDURE — 85025 COMPLETE CBC W/AUTO DIFF WBC: CPT

## 2021-07-23 PROCEDURE — 81003 URINALYSIS AUTO W/O SCOPE: CPT

## 2021-07-23 PROCEDURE — 74011250636 HC RX REV CODE- 250/636: Performed by: PHYSICIAN ASSISTANT

## 2021-07-23 RX ADMIN — SODIUM CHLORIDE 1000 ML: 900 INJECTION, SOLUTION INTRAVENOUS at 11:45

## 2021-07-23 NOTE — ED PROVIDER NOTES
EMERGENCY DEPARTMENT HISTORY AND PHYSICAL EXAM    Date: 7/23/2021  Patient Name: Luzma Steel    History of Presenting Illness     Chief Complaint:   Chief Complaint   Patient presents with    Flu Like Symptoms     History Provided By: Patient    Additional History (Context): Luzma Steel is a 63 y.o. female c/o generalized malaise, chills, subjective fever, feeling dehydrated and tired x2 days. Patient states her symptoms developed gradually, she had her second Covid shot Moderna 1 week ago. Patient denies any sick contacts or exposures. Denies cough, shortness of breath, loss of smell or taste, nasal congestion, rhinorrhea, nausea, vomiting, diarrhea, joint pain, joint swelling, rashes, swollen lymph nodes, LOC, balance problems, change in bowel or bladder patterns. PCP: Jessa Farley MD    Current Facility-Administered Medications   Medication Dose Route Frequency Provider Last Rate Last Admin    sodium chloride 0.9 % bolus infusion 1,000 mL  1,000 mL IntraVENous ONCE Cholo Anderson PA         Current Outpatient Medications   Medication Sig Dispense Refill    meloxicam (MOBIC) 15 mg tablet Take 1 Tablet by mouth daily. 30 Tablet 1    cyclobenzaprine (FLEXERIL) 5 mg tablet Take 1 Tab by mouth two (2) times a day. (Patient not taking: Reported on 6/10/2021) 8 Tab 0    acetaminophen (Tylenol Extra Strength) 500 mg tablet Take 2 Tabs by mouth every six (6) hours as needed for Pain. 20 Tab 0    lidocaine (Lidoderm) 5 % Apply patch to the affected area for 12 hours a day and remove for 12 hours a day. 30 Each 0    brinzolamide-brimonidine (Simbrinza) 1-0.2 % drps Administer 1 Drop to both eyes daily.  cetirizine (ZYRTEC) 10 mg tablet Take 10 mg by mouth nightly.  Lumigan 0.01 % ophthalmic drops Administer 1 Drop to both eyes nightly.  meclizine (ANTIVERT) 25 mg tablet Take 1 tablet by mouth every 6 hours for the next 3 days. Then stop taking the meclizine.   Restart the meclizine for 3 day intervals if vertigo/ dizziness returns. 20 Tab 0    olmesartan-hydroCHLOROthiazide (BENICAR HCT) 40-25 mg per tablet Take 1 Tab by mouth daily. Indications: high blood pressure 30 Tab 6    verapamil (CALAN) 120 mg tablet TAKE ONE TABLET BY MOUTH TWICE A DAY 60 Tab 6    albuterol (PROAIR HFA) 90 mcg/actuation inhaler Take 1-2 Puffs by inhalation every four (4) hours as needed for Wheezing. 1 Inhaler 6    fluticasone propion-salmeterol (ADVAIR) 100-50 mcg/dose diskus inhaler Take 1 Puff by inhalation every twelve (12) hours. 1 Inhaler 6    ergocalciferol (VITAMIN D2) 50,000 unit capsule TAKE ONE CAPSULE BY MOUTH EVERY 7 DAYS 4 Cap 11       Past History     Past Medical History:  Past Medical History:   Diagnosis Date    Abnormal liver enzymes 06/2018    Arthritis of knee, left 2007    Chronic obstructive pulmonary disease (HCC)     mild    Cir[facility]osis of liver ([facility] [address] 01/24/2018    Stage F2 fibrosis,compensated    Diverticulosis of sigmoid colon 10/2017    Mild diverticulosis    Erythrasma May 2014    beba feet, lower left leg    GERD (gastroesophageal reflux disease)     Glaucoma     H/O mammogram 03/01/2016    normal, f/u in 1 year    Heartburn 2018    Hepatitis C 2002    genotype 1a, previous IV drug user, declined interferons Completed HepC 2019    Hypertension 2002    Hypomagnesemia 11/18/2015    Migraine 77/33/9390    Nonalcoholic fatty liver disease     Obesity 08/2018    Positive FIT (fecal immunochemical test) 07/26/2017    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  concern for Necrolytic acral erythema    Tobacco use     working with [facility] smoking cessation        Past Surgical History:  Past Surgical History:   Procedure Laterality Date    COLONOSCOPY N/A 10/11/2017    COLONOSCOPY performed by Filomena Martinez MD at [address] HX CHOLECYSTECTOMY  2006    laparoscopic    HX COLONOSCOPY  2012    hx of polyps, [place] [facility] Bledsoe HX COLONOSCOPY  10/2017     Dr. Kelly Coles recommends your next colonoscopy in 5 years.  HX OTHER SURGICAL  2002    liver biopsy    HX PREMALIG/BENIGN SKIN LESION EXCISION  07/24/2019    right ear cyst removed    HX TUBAL LIGATION         Family History:  Family History   Problem Relation Age of Onset    Hypertension Mother     COPD Mother     Other Mother         Poly? nervous system    Cancer Father         rare form bone     Colon Polyps Sister     Colon Polyps Maternal Aunt        Social History:  Social History     Tobacco Use    Smoking status: Current Every Day Smoker     Packs/day: 0.50     Types: Cigarettes     Start date: 1/12/1974    Smokeless tobacco: Never Used   Vaping Use    Vaping Use: Never used   Substance Use Topics    Alcohol use: No    Drug use: Yes     Comment: IV crack cocaine, in remission since 2002       Allergies: Allergies   Allergen Reactions    Compazine [Prochlorperazine] Hives         Review of Systems   Review of Systems  All Other Systems Negative  10 point review of systems otherwise negative unless noted in HPI. Physical Exam     Vitals:    07/23/21 1123   BP: (!) 156/97   Pulse: 82   Resp: 16   Temp: 98.3 °F (36.8 °C)   SpO2: 96%     Physical Exam  Vitals and nursing note reviewed. Constitutional:       General: She is not in acute distress. Appearance: She is well-developed. She is not toxic-appearing or diaphoretic. HENT:      Head: Normocephalic and atraumatic. Nose: Nose normal.      Mouth/Throat:      Pharynx: Uvula midline. Comments: Matt BYNUM  Cardiovascular:      Rate and Rhythm: Normal rate and regular rhythm. Heart sounds: Normal heart sounds. Pulmonary:      Effort: Pulmonary effort is normal.      Breath sounds: Normal breath sounds. Abdominal:      General: Bowel sounds are normal.      Palpations: Abdomen is soft. Musculoskeletal:         General: Normal range of motion.       Cervical back: Normal range of motion and neck supple. Lymphadenopathy:      Cervical: No cervical adenopathy. Skin:     General: Skin is warm and dry. Neurological:      Mental Status: She is alert and oriented to person, place, and time. Diagnostic Study Results     Labs -   No results found for this or any previous visit (from the past 12 hour(s)). Radiologic Studies -   No orders to display     CT Results  (Last 48 hours)    None            Medical Decision Making   I am the first provider for this patient. I reviewed the vital signs, available nursing notes, past medical history, past surgical history, family history and social history. Vital Signs-Reviewed the patient's vital signs. Records Reviewed: Nursing Notes    Procedures:  Procedures    Provider Notes (Medical Decision Making):     Well-appearing patient presents with complaints of malaise that is believed to be a side effect of Covid vaccination. Patient reports feeling dehydrated, there are no signs of dehydration other than tacky mucous membranes. Patient has stable vital VS, BP is elevated but she did not take her BP meds this morning yet. She does not have HA, CP, palp's, S OB. Basic labs, EKG are reassuring. Patient received IVF in ED and is feeling better. She is stable for discharge. Will follow up with PCP for recheck. Will return to ED for any new or worse symptoms. Encouraged p.o. hydration. Discussed results, care in ED and further care, f/u and s/s warranting return to ED. Pt understood and agreed to plan. MED RECONCILIATION:  Current Facility-Administered Medications   Medication Dose Route Frequency    sodium chloride 0.9 % bolus infusion 1,000 mL  1,000 mL IntraVENous ONCE     Current Outpatient Medications   Medication Sig    meloxicam (MOBIC) 15 mg tablet Take 1 Tablet by mouth daily.  cyclobenzaprine (FLEXERIL) 5 mg tablet Take 1 Tab by mouth two (2) times a day.  (Patient not taking: Reported on 6/10/2021)    acetaminophen (Tylenol Extra Strength) 500 mg tablet Take 2 Tabs by mouth every six (6) hours as needed for Pain.  lidocaine (Lidoderm) 5 % Apply patch to the affected area for 12 hours a day and remove for 12 hours a day.  brinzolamide-brimonidine (Simbrinza) 1-0.2 % drps Administer 1 Drop to both eyes daily.  cetirizine (ZYRTEC) 10 mg tablet Take 10 mg by mouth nightly.  Lumigan 0.01 % ophthalmic drops Administer 1 Drop to both eyes nightly.  meclizine (ANTIVERT) 25 mg tablet Take 1 tablet by mouth every 6 hours for the next 3 days. Then stop taking the meclizine. Restart the meclizine for 3 day intervals if vertigo/ dizziness returns.  olmesartan-hydroCHLOROthiazide (BENICAR HCT) 40-25 mg per tablet Take 1 Tab by mouth daily. Indications: high blood pressure    verapamil (CALAN) 120 mg tablet TAKE ONE TABLET BY MOUTH TWICE A DAY    albuterol (PROAIR HFA) 90 mcg/actuation inhaler Take 1-2 Puffs by inhalation every four (4) hours as needed for Wheezing.  fluticasone propion-salmeterol (ADVAIR) 100-50 mcg/dose diskus inhaler Take 1 Puff by inhalation every twelve (12) hours.  ergocalciferol (VITAMIN D2) 50,000 unit capsule TAKE ONE CAPSULE BY MOUTH EVERY 7 DAYS       Disposition:  home    DISCHARGE NOTE:     Pt has been reexamined. Improved. Patient has no new complaints, changes, or physical findings. Care plan outlined and precautions discussed. Results of labs, EKG were reviewed with the patient. All medications were reviewed with the patient; will d/c home. All of pt's questions and concerns were addressed. Patient was instructed and agrees to follow up with PCP, as well as to return to the ED upon further deterioration. Patient is ready to go home.     Follow-up Information     Follow up With Specialties Details Why Contact Info    Adriane Pabon MD Family Medicine In 2 days for recheck of current symptoms, for blood pressure recheck and control [address] [facility] Enoch Chong  [facility] [address]      [facility] EMERGENCY DEPT Emergency Medicine  As needed, If symptoms worsen [address] [place] [id number]  [phone]          Current Discharge Medication List              Diagnosis     Clinical Impression:   1. Adverse effect of COVID-19 vaccine    2. Dehydration          Dictation disclaimer:  Please note that this dictation was completed with [employer], the computer voice recognition software. Quite often unanticipated grammatical, syntax, homophones, and other interpretive errors are inadvertently transcribed by the computer software. Please disregard these errors. Please excuse any errors that have escaped final proofreading.

## 2021-07-23 NOTE — ED NOTES
I have reviewed discharge instructions with the patient. The patient verbalized understanding. Discharged home, ambulatory, in stable condition.

## 2021-07-23 NOTE — ED TRIAGE NOTES
\"I took the Covid shot last Friday. I started feeling bad on Wednesday. I feel cold on the inside. I feel tired. \"

## 2021-10-14 ENCOUNTER — TRANSCRIBE ORDER (OUTPATIENT)
Dept: SCHEDULING | Age: 66
End: 2021-10-14

## 2021-10-14 DIAGNOSIS — K74.60 CIRRHOSIS (HCC): Primary | ICD-10-CM

## 2021-10-15 ENCOUNTER — TRANSCRIBE ORDER (OUTPATIENT)
Dept: SCHEDULING | Age: 66
End: 2021-10-15

## 2021-10-15 DIAGNOSIS — K74.60 CIRRHOSIS (HCC): Primary | ICD-10-CM

## 2021-10-18 ENCOUNTER — HOSPITAL ENCOUNTER (OUTPATIENT)
Dept: ULTRASOUND IMAGING | Age: 66
Discharge: HOME | End: 2021-10-18
Attending: NURSE PRACTITIONER
Payer: MEDICAID

## 2021-10-18 ENCOUNTER — HOSPITAL ENCOUNTER (OUTPATIENT)
Dept: LAB | Age: 66
Discharge: HOME | End: 2021-10-18

## 2021-10-18 DIAGNOSIS — K74.60 CIRRHOSIS (HCC): ICD-10-CM

## 2021-10-18 LAB — XX-LABCORP SPECIMEN COL,LCBCF: NORMAL

## 2021-10-18 PROCEDURE — 99001 SPECIMEN HANDLING PT-LAB: CPT

## 2021-10-18 PROCEDURE — 76705 ECHO EXAM OF ABDOMEN: CPT

## 2021-12-14 ENCOUNTER — TRANSCRIBE ORDER (OUTPATIENT)
Dept: SCHEDULING | Age: 66
End: 2021-12-14

## 2021-12-14 DIAGNOSIS — K74.60 CIRRHOSIS (HCC): ICD-10-CM

## 2021-12-14 DIAGNOSIS — R74.8 ABNORMAL LIVER ENZYMES: Primary | ICD-10-CM

## 2021-12-14 DIAGNOSIS — B18.2 HEPATITIS C, CHRONIC (HCC): ICD-10-CM

## 2021-12-14 DIAGNOSIS — R10.30 LOWER ABDOMINAL PAIN, UNSPECIFIED: ICD-10-CM

## 2021-12-14 DIAGNOSIS — E66.3 OVERWEIGHT (BMI 25.0-29.9): ICD-10-CM

## 2021-12-14 DIAGNOSIS — K76.0 NAFL (NONALCOHOLIC FATTY LIVER): ICD-10-CM

## 2021-12-28 ENCOUNTER — HOSPITAL ENCOUNTER (OUTPATIENT)
Dept: CT IMAGING | Age: 66
Discharge: HOME | End: 2021-12-28
Attending: INTERNAL MEDICINE
Payer: MEDICAID

## 2021-12-28 DIAGNOSIS — K74.60 CIRRHOSIS (HCC): ICD-10-CM

## 2021-12-28 DIAGNOSIS — K76.0 NAFL (NONALCOHOLIC FATTY LIVER): ICD-10-CM

## 2021-12-28 DIAGNOSIS — E66.3 OVERWEIGHT (BMI 25.0-29.9): ICD-10-CM

## 2021-12-28 DIAGNOSIS — R10.30 LOWER ABDOMINAL PAIN, UNSPECIFIED: ICD-10-CM

## 2021-12-28 DIAGNOSIS — B18.2 HEPATITIS C, CHRONIC (HCC): ICD-10-CM

## 2021-12-28 DIAGNOSIS — R74.8 ABNORMAL LIVER ENZYMES: ICD-10-CM

## 2021-12-28 PROCEDURE — 74176 CT ABD & PELVIS W/O CONTRAST: CPT

## 2022-01-12 NOTE — MR AVS SNAPSHOT
Have You Had Fillers Before?: has had fillers Visit Information Date & Time Provider Department Dept. Phone Encounter #  
 8/24/2017 10:30 AM TSS [facility] NURSE VISIT [facility] [phone] [id number] Your Appointments 12/19/2017 11:00 AM  
Follow Up with Samuel Lew NP [address] [facility] ([address]) Appt Note: 6 mt follow up  
 [address] [facility] [id number]  
[address] [facility] [address] Upcoming Health Maintenance Date Due DTaP/Tdap/Td series (1 - Tdap) 12/14/1978 INFLUENZA AGE 9 TO ADULT 8/1/2017 Pneumococcal 19-64 Medium Risk (1 of 1 - PPSV23) 1/24/2018* PAP AKA CERVICAL CYTOLOGY 1/6/2018 FOBT Q 1 YEAR AGE 50-75 7/23/2018 BREAST CANCER SCRN MAMMOGRAM 3/7/2019 *Topic was postponed. The date shown is not the original due date. Allergies as of 8/24/2017  Review Complete On: 8/24/2017 By: Airam Forman. Early, LPN Severity Noted Reaction Type Reactions Compazine [Prochlorperazine]  06/07/2013    Hives Current Immunizations  Reviewed on 11/18/2015 Name Date Influenza Vaccine (Quad) PF 10/17/2016, 11/18/2015 Influenza Vaccine PF 10/31/2013 Not reviewed this visit You Were Diagnosed With   
  
 Codes Comments Colon cancer screening    -  Primary ICD-10-CM: Z12.11 ICD-9-CM: V76.51 Vitals Pulse Temp Resp Height(growth percentile) Weight(growth percentile) SpO2  
 82 98 °F (36.7 °C) (Oral) 16 5' 7\" (1.702 m) 232 lb (105.2 kg) 96% BMI OB Status Smoking Status 36.34 kg/m2 Postmenopausal Current Every Day Smoker Vitals History BMI and BSA Data Body Mass Index Body Surface Area  
 36.34 kg/m 2 2.23 m 2 Preferred Pharmacy Pharmacy Name Phone [facility] [address] [facility] [phone] Your Updated Medication List  
  
   
 This list is accurate as of: 8/24/17 10:44 AM.  Always use your most recent med list.  
  
  
  
  
 albuterol 90 mcg/actuation inhaler Commonly known as:  PROAIR HFA Take 1-2 Puffs by inhalation every four (4) hours as needed for Wheezing. clobetasol 0.05 % ointment Commonly known as:  Nikki Gunning Apply  to affected area two (2) times a day. dexlansoprazole 30 mg capsule Commonly known as:  DEXILANT Take 1 Cap by mouth daily as needed. Indications: GASTROESOPHAGEAL REFLUX  
  
 fluticasone-salmeterol 100-50 mcg/dose diskus inhaler Commonly known as:  ADVAIR Take 1 Puff by inhalation every twelve (12) hours. ibuprofen 800 mg tablet Commonly known as:  MOTRIN Take 1 Tab by mouth every eight (8) hours as needed for Pain. Indications: Pain  
  
 ledipasvir-sofosbuvir  mg Tab Commonly known as:  Hayden Custard Take 1 Tab by mouth daily. Indications: CHRONIC HEPATITIS C - GENOTYPE 1  
  
 magnesium oxide 400 mg tablet Commonly known as:  MAG-OX Take 1 Tab by mouth daily. mometasone 50 mcg/actuation nasal spray Commonly known as:  NASONEX  
2 Sprays by Both Nostrils route daily. olmesartan-hydroCHLOROthiazide 40-25 mg per tablet Commonly known as:  BENICAR HCT Take 1 Tab by mouth daily. Indications: Hypertension  
  
 travoprost 0.004 % ophthalmic solution Commonly known as:  TRAVATAN Z Administer 1 Drop to both eyes every evening. Indications: OPEN ANGLE GLAUCOMA  
  
 verapamil 120 mg tablet Commonly known as:  CALAN Take 1 Tab by mouth two (2) times a day. Indications: Hypertension VITAMIN D2 50,000 unit capsule Generic drug:  ergocalciferol TAKE ONE CAPSULE BY MOUTH EVERY 7 DAYS To-Do List   
 10/24/2017 GI:  COLONOSCOPY Introducing [facility]! Dear Yoav Rojas: Thank you for requesting a [employer] account. Our records indicate that you already have an active [employer] account.   You can access your account When Was Your Last Filler Injection?: 2016 anytime at [email or web address] Did you know that you can access your hospital and ER discharge instructions at any time in [employer]? You can also review all of your test results from your hospital stay or ER visit. Additional Information If you have questions, please visit the Frequently Asked Questions section of the [employer] website at [email or web address] Remember, [employer] is NOT to be used for urgent needs. For medical emergencies, dial 911. Now available from your iPhone and Android! Please provide this summary of care documentation to your next provider. Your primary care clinician is listed as Dorys Mems. If you have any questions after today's visit, please call [phone].

## 2022-02-25 NOTE — TELEPHONE ENCOUNTER
Spoke to the patient about bringing in 2 of her most recent pay stubs to send to [employer] for approval of Josi Cruz. Tdap, Flu  Vaccine Information Statement(s) or the Emergency Use Authorization was given today. This has been reviewed, questions answered, and verbal consent given by Patient for injection(s) and administration of Diphtheria/Tetanus/Pertussis (Dtap) and Influenza (Inactivated).      Patient tolerated without incident. See immunization grid for documentation.

## 2022-03-24 ENCOUNTER — TRANSCRIBE ORDER (OUTPATIENT)
Dept: SCHEDULING | Age: 67
End: 2022-03-24

## 2022-03-24 DIAGNOSIS — K74.60 CIRRHOSIS (HCC): Primary | ICD-10-CM

## 2022-03-31 ENCOUNTER — HOSPITAL ENCOUNTER (OUTPATIENT)
Dept: ULTRASOUND IMAGING | Age: 67
Discharge: HOME | End: 2022-03-31
Attending: NURSE PRACTITIONER
Payer: MEDICAID

## 2022-03-31 DIAGNOSIS — K74.60 CIRRHOSIS (HCC): ICD-10-CM

## 2022-03-31 PROCEDURE — 76705 ECHO EXAM OF ABDOMEN: CPT

## 2022-05-04 ENCOUNTER — TRANSCRIBE ORDER (OUTPATIENT)
Dept: SCHEDULING | Age: 67
End: 2022-05-04

## 2022-05-04 DIAGNOSIS — Z12.31 VISIT FOR SCREENING MAMMOGRAM: Primary | ICD-10-CM

## 2022-05-17 ENCOUNTER — HOSPITAL ENCOUNTER (OUTPATIENT)
Dept: MAMMOGRAPHY | Age: 67
Discharge: HOME | End: 2022-05-17
Attending: STUDENT IN AN ORGANIZED HEALTH CARE EDUCATION/TRAINING PROGRAM
Payer: MEDICAID

## 2022-05-17 DIAGNOSIS — Z12.31 VISIT FOR SCREENING MAMMOGRAM: ICD-10-CM

## 2022-05-17 PROCEDURE — 77063 BREAST TOMOSYNTHESIS BI: CPT

## 2022-06-28 ENCOUNTER — HOSPITAL ENCOUNTER (OUTPATIENT)
Dept: LAB | Age: 67
Discharge: HOME | End: 2022-06-28

## 2022-06-28 LAB — XX-LABCORP SPECIMEN COL,LCBCF: NORMAL

## 2022-06-28 PROCEDURE — 99001 SPECIMEN HANDLING PT-LAB: CPT

## 2022-08-09 RX ORDER — MONTELUKAST SODIUM 10 MG/1
10 TABLET ORAL DAILY
COMMUNITY

## 2022-08-09 RX ORDER — FAMOTIDINE 20 MG/1
20 TABLET, FILM COATED ORAL 2 TIMES DAILY
COMMUNITY

## 2022-08-09 NOTE — PERIOP NOTES
PRE-SURGICAL INSTRUCTIONS        Patient's Name:  Mercy San      Today's Date:  8/9/2022                Surgery Date:  8/11/2022                Do NOT eat or drink anything, including candy, gum, or ice chips after midnight on 8/11/2022, unless you have specific instructions from your surgeon or anesthesia provider to do so. You may brush your teeth before coming to the hospital.  No smoking 24 hours prior to the day of surgery. No alcohol 24 hours prior to the day of surgery. No recreational drugs for one week prior to the day of surgery. Leave all valuables, including money/purse, at home. Remove all jewelry, nail polish, acrylic nails, and makeup (including mascara); no lotions powders, deodorant, or perfume/cologne/after shave on the skin. Follow instruction for Hibiclens washes and CHG wipes from surgeon's office. Glasses/contact lenses and dentures may be worn to the hospital.  They will be removed prior to surgery. Call your doctor if symptoms of a cold or illness develop within 24-48 hours prior to your surgery. 11.  If you are having an outpatient procedure, please make arrangements for a responsible ADULT TO [address] [place] [address] and stay with you for 24 hours after your surgery. 12. ONE VISITOR in the hospital at this time for outpatient procedures. Exceptions may be made for surgical admissions, per nursing unit guidelines      Special Instructions:      Bring list of CURRENT medications. Bring inhaler. Bring any pertinent legal medical records. Take these medications the morning of surgery with a sip of water:  Per Surgeon, Advair, Albuterol, Verapamil   Follow physician instructions about stopping anticoagulants. Complete bowel prep per MD instructions. On the day of surgery, come in the main entrance of [facility]. Let the  at the desk know you are there for surgery.   A staff member will come escort you to the surgical area on the second floor. If you have any questions or concerns, please do not hesitate to call:     (Prior to the day of surgery) PAT department:  [phone]   (Day of surgery) Pre-Op department:  [phone]    These surgical instructions were reviewed with Radha Kirk during the [facility] phone call.

## 2022-08-10 ENCOUNTER — ANESTHESIA EVENT (OUTPATIENT)
Dept: ENDOSCOPY | Age: 67
End: 2022-08-10
Payer: MEDICAID

## 2022-08-11 ENCOUNTER — ANESTHESIA (OUTPATIENT)
Dept: ENDOSCOPY | Age: 67
End: 2022-08-11
Payer: MEDICAID

## 2022-08-11 ENCOUNTER — HOSPITAL ENCOUNTER (OUTPATIENT)
Age: 67
Setting detail: OUTPATIENT SURGERY
Discharge: HOME | End: 2022-08-11
Attending: INTERNAL MEDICINE | Admitting: INTERNAL MEDICINE
Payer: MEDICAID

## 2022-08-11 VITALS
OXYGEN SATURATION: 98 % | HEIGHT: 67 IN | HEART RATE: 69 BPM | BODY MASS INDEX: 36.57 KG/M2 | TEMPERATURE: 97.7 F | WEIGHT: 233 LBS | DIASTOLIC BLOOD PRESSURE: 68 MMHG | RESPIRATION RATE: 16 BRPM | SYSTOLIC BLOOD PRESSURE: 99 MMHG

## 2022-08-11 DIAGNOSIS — M25.562 CHRONIC PAIN OF LEFT KNEE: ICD-10-CM

## 2022-08-11 DIAGNOSIS — G89.29 CHRONIC PAIN OF LEFT KNEE: ICD-10-CM

## 2022-08-11 DIAGNOSIS — M17.0 PRIMARY OSTEOARTHRITIS OF BOTH KNEES: ICD-10-CM

## 2022-08-11 DIAGNOSIS — M17.12 PRIMARY OSTEOARTHRITIS OF LEFT KNEE: ICD-10-CM

## 2022-08-11 PROCEDURE — 74011250636 HC RX REV CODE- 250/636: Performed by: NURSE ANESTHETIST, CERTIFIED REGISTERED

## 2022-08-11 PROCEDURE — 74011000250 HC RX REV CODE- 250: Performed by: NURSE ANESTHETIST, CERTIFIED REGISTERED

## 2022-08-11 PROCEDURE — 74011250637 HC RX REV CODE- 250/637: Performed by: INTERNAL MEDICINE

## 2022-08-11 PROCEDURE — 77030013992 HC SNR POLYP ENDOSC BSC -B: Performed by: INTERNAL MEDICINE

## 2022-08-11 PROCEDURE — 88305 TISSUE EXAM BY PATHOLOGIST: CPT

## 2022-08-11 PROCEDURE — 76040000007: Performed by: INTERNAL MEDICINE

## 2022-08-11 PROCEDURE — 77030008565 HC TBNG SUC IRR ERBE -B: Performed by: INTERNAL MEDICINE

## 2022-08-11 PROCEDURE — 00813 ANES UPR LWR GI NDSC PX: CPT | Performed by: ANESTHESIOLOGY

## 2022-08-11 PROCEDURE — 76060000032 HC ANESTHESIA 0.5 TO 1 HR: Performed by: INTERNAL MEDICINE

## 2022-08-11 PROCEDURE — 2709999900 HC NON-CHARGEABLE SUPPLY: Performed by: INTERNAL MEDICINE

## 2022-08-11 PROCEDURE — 00813 ANES UPR LWR GI NDSC PX: CPT | Performed by: NURSE ANESTHETIST, CERTIFIED REGISTERED

## 2022-08-11 RX ORDER — SODIUM CHLORIDE 0.9 % (FLUSH) 0.9 %
5-40 SYRINGE (ML) INJECTION EVERY 8 HOURS
Status: DISCONTINUED | OUTPATIENT
Start: 2022-08-11 | End: 2022-08-11 | Stop reason: HOSPADM

## 2022-08-11 RX ORDER — FLUMAZENIL 0.1 MG/ML
0.2 INJECTION INTRAVENOUS
Status: DISCONTINUED | OUTPATIENT
Start: 2022-08-11 | End: 2022-08-11 | Stop reason: HOSPADM

## 2022-08-11 RX ORDER — LIDOCAINE HYDROCHLORIDE 20 MG/ML
INJECTION, SOLUTION EPIDURAL; INFILTRATION; INTRACAUDAL; PERINEURAL AS NEEDED
Status: DISCONTINUED | OUTPATIENT
Start: 2022-08-11 | End: 2022-08-11 | Stop reason: HOSPADM

## 2022-08-11 RX ORDER — LIDOCAINE HYDROCHLORIDE 10 MG/ML
0.1 INJECTION, SOLUTION EPIDURAL; INFILTRATION; INTRACAUDAL; PERINEURAL AS NEEDED
Status: DISCONTINUED | OUTPATIENT
Start: 2022-08-11 | End: 2022-08-11 | Stop reason: HOSPADM

## 2022-08-11 RX ORDER — ATROPINE SULFATE 0.1 MG/ML
0.5 INJECTION INTRAVENOUS
Status: DISCONTINUED | OUTPATIENT
Start: 2022-08-11 | End: 2022-08-11 | Stop reason: HOSPADM

## 2022-08-11 RX ORDER — DEXTROMETHORPHAN/PSEUDOEPHED 2.5-7.5/.8
1.2 DROPS ORAL
Status: DISCONTINUED | OUTPATIENT
Start: 2022-08-11 | End: 2022-08-11 | Stop reason: HOSPADM

## 2022-08-11 RX ORDER — EPINEPHRINE 0.1 MG/ML
1 INJECTION INTRACARDIAC; INTRAVENOUS
Status: DISCONTINUED | OUTPATIENT
Start: 2022-08-11 | End: 2022-08-11 | Stop reason: HOSPADM

## 2022-08-11 RX ORDER — DEXTROMETHORPHAN/PSEUDOEPHED 2.5-7.5/.8
DROPS ORAL AS NEEDED
Status: DISCONTINUED | OUTPATIENT
Start: 2022-08-11 | End: 2022-08-11 | Stop reason: HOSPADM

## 2022-08-11 RX ORDER — SODIUM CHLORIDE 0.9 % (FLUSH) 0.9 %
5-40 SYRINGE (ML) INJECTION AS NEEDED
Status: DISCONTINUED | OUTPATIENT
Start: 2022-08-11 | End: 2022-08-11 | Stop reason: HOSPADM

## 2022-08-11 RX ORDER — PROPOFOL 10 MG/ML
INJECTION, EMULSION INTRAVENOUS AS NEEDED
Status: DISCONTINUED | OUTPATIENT
Start: 2022-08-11 | End: 2022-08-11 | Stop reason: HOSPADM

## 2022-08-11 RX ORDER — NALOXONE HYDROCHLORIDE 0.4 MG/ML
0.4 INJECTION, SOLUTION INTRAMUSCULAR; INTRAVENOUS; SUBCUTANEOUS
Status: DISCONTINUED | OUTPATIENT
Start: 2022-08-11 | End: 2022-08-11 | Stop reason: HOSPADM

## 2022-08-11 RX ORDER — SODIUM CHLORIDE, SODIUM LACTATE, POTASSIUM CHLORIDE, CALCIUM CHLORIDE 600; 310; 30; 20 MG/100ML; MG/100ML; MG/100ML; MG/100ML
50 INJECTION, SOLUTION INTRAVENOUS CONTINUOUS
Status: DISCONTINUED | OUTPATIENT
Start: 2022-08-11 | End: 2022-08-11 | Stop reason: HOSPADM

## 2022-08-11 RX ADMIN — FAMOTIDINE 20 MG: 10 INJECTION INTRAVENOUS at 08:40

## 2022-08-11 RX ADMIN — PROPOFOL 50 MG: 10 INJECTION, EMULSION INTRAVENOUS at 09:15

## 2022-08-11 RX ADMIN — PROPOFOL 30 MG: 10 INJECTION, EMULSION INTRAVENOUS at 09:00

## 2022-08-11 RX ADMIN — PROPOFOL 20 MG: 10 INJECTION, EMULSION INTRAVENOUS at 09:02

## 2022-08-11 RX ADMIN — SODIUM CHLORIDE, POTASSIUM CHLORIDE, SODIUM LACTATE AND CALCIUM CHLORIDE 50 ML/HR: 600; 310; 30; 20 INJECTION, SOLUTION INTRAVENOUS at 08:39

## 2022-08-11 RX ADMIN — PROPOFOL 25 MG: 10 INJECTION, EMULSION INTRAVENOUS at 09:06

## 2022-08-11 RX ADMIN — PROPOFOL 25 MG: 10 INJECTION, EMULSION INTRAVENOUS at 09:09

## 2022-08-11 RX ADMIN — PROPOFOL 30 MG: 10 INJECTION, EMULSION INTRAVENOUS at 08:58

## 2022-08-11 RX ADMIN — PROPOFOL 25 MG: 10 INJECTION, EMULSION INTRAVENOUS at 09:13

## 2022-08-11 RX ADMIN — PROPOFOL 50 MG: 10 INJECTION, EMULSION INTRAVENOUS at 09:18

## 2022-08-11 RX ADMIN — PROPOFOL 25 MG: 10 INJECTION, EMULSION INTRAVENOUS at 09:11

## 2022-08-11 RX ADMIN — PROPOFOL 70 MG: 10 INJECTION, EMULSION INTRAVENOUS at 08:56

## 2022-08-11 RX ADMIN — PROPOFOL 50 MG: 10 INJECTION, EMULSION INTRAVENOUS at 09:21

## 2022-08-11 RX ADMIN — LIDOCAINE HYDROCHLORIDE 40 MG: 20 INJECTION, SOLUTION EPIDURAL; INFILTRATION; INTRACAUDAL; PERINEURAL at 08:56

## 2022-08-11 NOTE — DISCHARGE INSTRUCTIONS
Upper GI Endoscopy: What to Expect at [address] had an upper GI endoscopy. Your doctor used a thin, lighted tube that bends to look at the inside of your esophagus, your stomach, and the first part of the small intestine, called the duodenum. After you have an endoscopy, you will stay at the hospital or clinic for 1 to 2 hours. This will allow the medicine to wear off. You will be able to go home after your doctor or nurse checks to make sure that you're not having any problems. You may have to stay overnight if you had treatment during the test. You may have a sore throat for a day or two after the test.  This care sheet gives you a general idea about what to expect after the test.  How can you care for yourself at home? Activity   Rest as much as you need to after you go home. You should be able to go back to your usual activities the day after the test.  Diet   Follow your doctor's directions for eating after the test.  Drink plenty of fluids (unless your doctor has told you not to). Medications   If you have a sore throat the day after the test, use an over-the-counter spray to numb your throat. Follow-up care is a key part of your treatment and safety. Be sure to make and go to all appointments, and call your doctor if you are having problems. It's also a good idea to know your test results and keep a list of the medicines you take. When should you call for help? Call 911 anytime you think you may need emergency care. For example, call if:    You passed out (lost consciousness). You have trouble breathing. You pass maroon or bloody stools. Call your doctor now or seek immediate medical care if:    You have pain that does not get better after your take pain medicine. You have new or worse belly pain. You have blood in your stools. You are sick to your stomach and cannot keep fluids down. You have a fever. You cannot pass stools or gas.    Watch closely for changes in your health, and be sure to contact your doctor if:    Your throat still hurts after a day or two. You do not get better as expected. Where can you learn more? Go to http://www.connolly.com/  Enter J454 in the search box to learn more about \"Upper GI Endoscopy: What to Expect at Home. \"  Current as of: September 8, 2021               Content Version: 13.2  © 2006-2022 [employer]. Care instructions adapted under license by [employer] (which disclaims liability or warranty for this information). If you have questions about a medical condition or this instruction, always ask your healthcare professional. [place] [address] any warranty or liability for your use of this information. Colonoscopy: What to Expect at [address] [place] [address]  After a colonoscopy, you'll stay at the clinic until you wake up. Then you can go home. But you'll need to arrange for a ride. Your doctor will tell you when you can eat and do your other usual activities. Your doctor will talk to you about when you'll need your next colonoscopy. Your doctor can help you decide how often you need to be checked. This will depend on the results of your test and your risk for colorectal cancer. After the test, you may be bloated or have gas pains. You may need to pass gas. If a biopsy was done or a polyp was removed, you may have streaks of blood in your stool (feces) for a few days. Problems such as heavy rectal bleeding may not occur until several weeks after the test. This isn't common. But it can happen after polyps are removed. This care sheet gives you a general idea about how long it will take for you to recover. But each person recovers at a different pace. Follow the steps below to get better as quickly as possible. How can you care for yourself at home? Activity    Rest when you feel tired. You can do your normal activities when it feels okay to do so. Diet    Follow your doctor's directions for eating. Unless your doctor has told you not to, drink plenty of fluids. This helps to replace the fluids that were lost during the colon prep. Do not drink alcohol. Medicines    Your doctor will tell you if and when you can restart your medicines. You will also be given instructions about taking any new medicines. If you take aspirin or some other blood thinner, ask your doctor if and when to start taking it again. Make sure that you understand exactly what your doctor wants you to do. If polyps were removed or a biopsy was done during the test, your doctor may tell you not to take aspirin or other anti-inflammatory medicines for a few days. These include ibuprofen (Advil, Motrin) and naproxen (Aleve). Other instructions    For your safety, do not drive or operate machinery until the medicine wears off and you can think clearly. Your doctor may tell you not to drive or operate machinery until the day after your test.     Do not sign legal documents or make major decisions until the medicine wears off and you can think clearly. The anesthesia can make it hard for you to fully understand what you are agreeing to. Follow-up care is a key part of your treatment and safety. Be sure to make and go to all appointments, and call your doctor if you are having problems. It's also a good idea to know your test results and keep a list of the medicines you take. When should you call for help? Call 911 anytime you think you may need emergency care. For example, call if:    You passed out (lost consciousness). You pass maroon or bloody stools. You have trouble breathing. Call your doctor now or seek immediate medical care if:    You have pain that does not get better after you take pain medicine. You are sick to your stomach or cannot drink fluids. You have new or worse belly pain. You have blood in your stools. You have a fever. You cannot pass stools or gas. Watch closely for changes in your health, and be sure to contact your doctor if you have any problems. Where can you learn more? Go to http://www.gray.com/  Enter E264 in the search box to learn more about \"Colonoscopy: What to Expect at Home. \"  Current as of: September 8, 2021               Content Version: 13.2  © 2006-2022 [employer]. Care instructions adapted under license by [employer] (which disclaims liability or warranty for this information). If you have questions about a medical condition or this instruction, always ask your healthcare professional. [place] [address] any warranty or liability for your use of this information. DISCHARGE SUMMARY from Nurse     POST-PROCEDURE INSTRUCTIONS:    Call your Physician if you:  Observe any excess bleeding. Develop a temperature over 100.5o F. Experience abdominal, shoulder or chest pain. Notice any signs of decreased circulation or nerve impairment to an extremity such as a change in color, persistent numbness, tingling, coldness or increase in pain. Vomit blood or you have nausea and vomiting lasting longer than 4 hours. Are unable to take medications. Are unable to urinate within 8 hours after discharge following general anesthesia or intravenous sedation. For the next 24 hours after receiving general anesthesia or intravenous sedation, or while taking prescription Narcotics, limit your activities:  Do NOT drive a motor vehicle, operate hazard machinery or power tools, or perform tasks that require coordination. The medication you received during your procedure may have some effect on your mental awareness. Do NOT make important personal or business decisions. The medication you received during your procedure may have some effect on your mental awareness. Do NOT drink alcoholic beverages.   These drinks do not mix well with the medications that have been given to you. Upon discharge from the hospital, you must be accompanied by a responsible adult. Resume your diet as directed by your physician. Resume medications as your physician has prescribed. Please give a list of your current medications to your Primary Care Provider. Please update this list whenever your medications are discontinued, doses are changed, or new medications (including over-the-counter products) are added. Please carry medication information at all times in case of emergency situations. These are general instructions for a healthy lifestyle:    No smoking/ No tobacco products/ Avoid exposure to second hand smoke. Surgeon General's Warning:  Quitting smoking now greatly reduces serious risk to your health. Obesity, smoking, and a sedentary lifestyle greatly increase your risk for illness. A healthy diet, regular physical exercise & weight monitoring are important for maintaining a healthy lifestyle  You may be retaining fluid if you have a history of heart failure or if you experience any of the following symptoms:  Weight gain of 3 pounds or more overnight or 5 pounds in a week, increased swelling in our hands or feet or shortness of breath while lying flat in bed. Please call your doctor as soon as you notice any of these symptoms; do not wait until your next office visit. Recognize signs and symptoms of STROKE:  F  -  Face looks uneven  A  -  Arms unable to move or move unevenly  S  -  Speech slurred or non-existent  T  -  Time to call 911 - as soon as signs and symptoms begin - DO NOT go back to bed or wait to see If you get better - TIME IS BRAIN. Colorectal Screening  Colorectal cancer almost always develops from precancerous polyps (abnormal growths) in the colon or rectum. Screening tests can find precancerous polyps, so that they can be removed before they turn into cancer.  Screening tests can also find colorectal cancer early, when treatment works best.  Speak with your physician about when you should begin screening and how often you should be tested. [employer] Activation    Thank you for requesting access to [employer]. Please follow the instructions below to securely access and download your online medical record. [employer] allows you to send messages to your doctor, view your test results, renew your prescriptions, schedule appointments, and more. How Do I Sign Up? In your internet browser, go to [email or web address]. Click on the First Time User? Click Here link in the Sign In box. You will see the New Member Sign Up page. Enter your [employer] Access Code exactly as it appears below. You will not need to use this code after youve completed the sign-up process. If you do not sign up before the expiration date, you must request a new code. [employer] Access Code: Activation code not generated  Current [employer] Status: Active (This is the date your [employer] access code will )    Enter the last four digits of your Social Security Number (xxxx) and Date of Birth (mm/dd/yyyy) as indicated and click Submit. You will be taken to the next sign-up page. Create a [employer] ID. This will be your [employer] login ID and cannot be changed, so think of one that is secure and easy to remember. Create a [employer] password. You can change your password at any time. Enter your Password Reset Question and Answer. This can be used at a later time if you forget your password. Enter your e-mail address. You will receive e-mail notification when new information is available in [address] Click Sign Up. You can now view and download portions of your medical record. Click the B[employer]change link to download a portable copy of your medical information. Additional Information    If you have questions, please call [phone]. Remember, [employer] is NOT to be used for urgent needs. For medical emergencies, dial 911.     Educational references and/or instructions provided during this visit included:    See Attached      APPOINTMENTS:    Per MD Instruction    Discharge information has been reviewed with the patient. The patient verbalized understanding. Hemorrhoids: Care Instructions  Overview     Hemorrhoids are swollen veins that develop in the anal canal. Bleeding during bowel movements, itching, and rectal pain are the most common symptoms. Hemorrhoids can be uncomfortable at times, but rarely are they a serious problem. Most of the time, you can treat them with simple changes to your diet and bowel habits. These changes include eating more fiber and not straining to pass stools. Most hemorrhoids don't need surgery or other treatment unless they are very large and painful or bleed a lot. Follow-up care is a key part of your treatment and safety. Be sure to make and go to all appointments, and call your doctor if you are having problems. It's also a good idea to know your test results and keep a list of the medicines you take. How can you care for yourself at home? Sit in a few inches of warm water (sitz bath) 3 times a day and after bowel movements. The warm water helps with pain and itching. Put ice on your anal area several times a day for 10 minutes at a time. Put a thin cloth between the ice and your skin. Follow this by placing a warm, wet towel on the area for another 10 to 20 minutes. Take pain medicines exactly as directed. If the doctor gave you a prescription medicine for pain, take it as prescribed. If you are not taking a prescription pain medicine, ask your doctor if you can take an over-the-counter medicine. Keep the anal area clean, but be gentle. Use water and a fragrance-free soap, or use baby wipes or medicated pads such as Tucks. Wear cotton underwear and loose clothing to decrease moisture in the anal area. Eat more fiber.  Include foods such as whole-grain breads and cereals, raw vegetables, raw and dried fruits, and beans. Drink plenty of fluids. If you have kidney, heart, or liver disease and have to limit fluids, talk with your doctor before you increase the amount of fluids you drink. Use a stool softener that contains bran or psyllium. You can save money by buying bran or psyllium (available in bulk at most health food stores) and sprinkling it on foods or stirring it into fruit juice. Or you can use a product such as Metamucil or Hydrocil. Practice healthy bowel habits. Go to the bathroom as soon as you have the urge. Avoid straining to pass stools. Relax and give yourself time to let things happen naturally. Do not hold your breath while passing stools. Do not read while sitting on the toilet. Get off the toilet as soon as you have finished. Take your medicines exactly as prescribed. Call your doctor if you think you are having a problem with your medicine. When should you call for help? Call 911 anytime you think you may need emergency care. For example, call if:    You pass maroon or very bloody stools. Call your doctor now or seek immediate medical care if:    You have increased pain. You have increased bleeding. Watch closely for changes in your health, and be sure to contact your doctor if:    Your symptoms have not improved after 3 or 4 days. Where can you learn more? Go to http://www.connolly.com/  Enter F228 in the search box to learn more about \"Hemorrhoids: Care Instructions. \"  Current as of: September 8, 2021               Content Version: 13.2  © 2006-2022 [employer]. Care instructions adapted under license by [employer] (which disclaims liability or warranty for this information). If you have questions about a medical condition or this instruction, always ask your healthcare professional. [place] [address] any warranty or liability for your use of this information.          Colon Polyps: Care Instructions  Your Care Instructions     Colon polyps are growths in the colon or the rectum. The cause of most colon polyps is not known, and most people who get them do not have any problems. But a certain kind can turn into cancer. For this reason, regular testing for colon polyps is important for people as they get older. It is also important for anyone who has an increased risk for colon cancer. Polyps are usually found through routine colon cancer screening tests. Although most colon polyps are not cancerous, they are usually removed and then tested for cancer. Screening for colon cancer saves lives because the cancer can usually be cured if it is caught early. If you have a polyp that is the type that can turn into cancer, you may need more tests to examine your entire colon. The doctor will remove any other polyps that he or she finds, and you will be tested more often. Follow-up care is a key part of your treatment and safety. Be sure to make and go to all appointments, and call your doctor if you are having problems. It's also a good idea to know your test results and keep a list of the medicines you take. How can you care for yourself at home? Regular exams to look for colon polyps are the best way to prevent polyps from turning into colon cancer. These can include stool tests, sigmoidoscopy, colonoscopy, and CT colonography. Talk with your doctor about a testing schedule that is right for you. To prevent polyps  There is no home treatment that can prevent colon polyps. But these steps may help lower your risk for cancer. Stay active. Being active can help you get to and stay at a healthy weight. Try to exercise on most days of the week. Walking is a good choice. Eat well. Choose a variety of vegetables, fruits, legumes (such as peas and beans), fish, poultry, and whole grains. Do not smoke. If you need help quitting, talk to your doctor about stop-smoking programs and medicines.  These can increase your chances of quitting for good. If you drink alcohol, limit how much you drink. Limit alcohol to 2 drinks a day for men and 1 drink a day for women. When should you call for help? Call your doctor now or seek immediate medical care if:    You have severe belly pain. Your stools are maroon or very bloody. Watch closely for changes in your health, and be sure to contact your doctor if:    You have a fever. You have nausea or vomiting. You have a change in bowel habits (new constipation or diarrhea). Your symptoms get worse or are not improving as expected. Where can you learn more? Go to http://www.connolly.com/  Enter C571 in the search box to learn more about \"Colon Polyps: Care Instructions. \"  Current as of: September 8, 2021               Content Version: 13.2  © 2006-2022 [employer]. Care instructions adapted under license by [employer] (which disclaims liability or warranty for this information). If you have questions about a medical condition or this instruction, always ask your healthcare professional. [place] [address] any warranty or liability for your use of this information. Diverticulosis: Care Instructions  Your Care Instructions  In diverticulosis, pouches called diverticula form in the wall of the large intestine (colon). The pouches do not cause any pain or other symptoms. Most people who have diverticulosis do not know they have it. But the pouches sometimes bleed, and if they become infected, they can cause pain and other symptoms. When this happens, it is called diverticulitis. Diverticula form when pressure pushes the wall of the colon outward at certain weak points. A diet that is too low in fiber can cause diverticula. Follow-up care is a key part of your treatment and safety. Be sure to make and go to all appointments, and call your doctor if you are having problems.  It's also a good idea to know your test results and keep a list of the medicines you take. How can you care for yourself at home? Include fruits, leafy green vegetables, beans, and whole grains in your diet each day. These foods are high in fiber. Take a fiber supplement, such as Citrucel or Metamucil, every day if needed. Read and follow all instructions on the label. Drink plenty of fluids. If you have kidney, heart, or liver disease and have to limit fluids, talk with your doctor before you increase the amount of fluids you drink. Get at least 30 minutes of exercise on most days of the week. Walking is a good choice. You also may want to do other activities, such as running, swimming, cycling, or playing tennis or team sports. Cut out foods that cause gas, pain, or other symptoms. When should you call for help? Call your doctor now or seek immediate medical care if:    You have belly pain. You pass maroon or very bloody stools. You have a fever. You have nausea and vomiting. You have unusual changes in your bowel movements or abdominal swelling. You have burning pain when you urinate. You have abnormal vaginal discharge. You have shoulder pain. You have cramping pain that does not get better when you have a bowel movement or pass gas. You pass gas or stool from your urethra while urinating. Watch closely for changes in your health, and be sure to contact your doctor if you have any problems. Where can you learn more? Go to http://www.gray.com/  Enter [id number] in the search box to learn more about \"Diverticulosis: Care Instructions. \"  Current as of: September 8, 2021               Content Version: 13.2  © 9685-5197 [employer]. Care instructions adapted under license by [employer] (which disclaims liability or warranty for this information).  If you have questions about a medical condition or this instruction, always ask your healthcare professional. [address] any warranty or liability for your use of this information.

## 2022-08-11 NOTE — ANESTHESIA PREPROCEDURE EVALUATION
Relevant Problems   NEUROLOGY   (+) Migraine      CARDIOVASCULAR   (+) HTN (hypertension)      GASTROINTESTINAL   (+) GERD (gastroesophageal reflux disease)   (+) Hep C w/o coma, chronic (HCC)   (+) Hepatitis C infection      ENDOCRINE   (+) Arthritis of left knee   (+) Severe obesity (BMI 35.0-39. 9)       Anesthetic History   No history of anesthetic complications            Review of Systems / Medical History  Patient summary reviewed and pertinent labs reviewed    Pulmonary    COPD: moderate      Shortness of breath and smoker         Neuro/Psych         Headaches and psychiatric history     Cardiovascular    Hypertension              Exercise tolerance: <4 METS  Comments: EF 60%  2015   GI/Hepatic/Renal     GERD  Hepatitis: type C    Liver disease     Endo/Other        Obesity and arthritis     Other Findings              Physical Exam    Airway  Mallampati: II  TM Distance: > 6 cm  Neck ROM: normal range of motion   Mouth opening: Normal     Cardiovascular  Regular rate and rhythm,  S1 and S2 normal,  no murmur, click, rub, or gallop             Dental    Dentition: Poor dentition     Pulmonary      Decreased breath sounds: bilateral           Abdominal  GI exam deferred       Other Findings            Anesthetic Plan    ASA: 4  Anesthesia type: MAC          Induction: Intravenous  Anesthetic plan and risks discussed with: Patient

## 2022-08-11 NOTE — H&P
[email or web address]  [phone]    GASTROENTEROLOGY Pre-Procedure H and P      Impression/Plan:   1. This patient is consented for an EGD and colonoscopy for h/o cirrhosis and h/o colon polyps. Chief Complaint: h/o cirrhosis and h/o colon polyps. HPI:  Gee Lowry is a 61 y.o. female who presents for an EGD and colonoscopy for evaluation of h/o cirrhosis and h/o colon polyps. PMH:   Past Medical History:   Diagnosis Date    Abnormal liver enzymes 06/2018    Arthritis of knee, left 2007    Chronic obstructive pulmonary disease ([address]     mild    Cirrhosis of liver ([address] 01/24/2018    Stage F2 fibrosis,compensated    Diverticulosis of sigmoid colon 10/2017    Mild diverticulosis    Erythrasma May 2014    beba feet, lower left leg    GERD (gastroesophageal reflux disease)     Glaucoma     H/O mammogram 03/01/2016    normal, f/u in 1 year    Heartburn 2018    Hepatitis C 2002    genotype 1a, previous IV drug user, declined interferons Completed HepC 2019    Hypertension 2002    Hypomagnesemia 11/18/2015    Migraine 81/76/8049    Nonalcoholic fatty liver disease     Obesity 08/2018    Positive FIT (fecal immunochemical test) 07/26/2017    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 concern for Necrolytic acral erythema    Tobacco use     working with  [facility] Pulmonology smoking cessation        PSH:   Past Surgical History:   Procedure Laterality Date    COLONOSCOPY N/A 10/11/2017    COL[facility]COPY performed by [facility] at [facility]  2006    laparoscopic    HX COLONOSCOPY  2012    hx of polyps, [place]    HX COLONOSCOPY  10/2017     Dr. Christiane Fierro recommends your next colonoscopy in 5 years.     HX OTHER SURGICAL  2002    liver biopsy    HX PREMALIG/BENIGN SKIN LESION EXCISION  07/24/2019    right ear cyst removed    HX TUBAL LIGATION         Social HX:   Social History     Socioeconomic History    Marital status:      Spouse name: Not on file    Number of children: 2    Years of education: Not on file    Highest education level: Not on file   Occupational History     Employer: [employer]    Tobacco Use    Smoking status: Every Day     Packs/day: 0.50     Types: Cigarettes     Start date: 1/12/1974    Smokeless tobacco: Never   Vaping Use    Vaping Use: Never used   Substance and Sexual Activity    Alcohol use: No    Drug use: Not Currently     Types: Cocaine     Comment: IV crack cocaine, in remission since 2002    Sexual activity: Yes     Partners: Male   Other Topics Concern     Service No    Blood Transfusions No    Caffeine Concern No    Occupational Exposure No    Hobby Hazards No    Sleep Concern No    Stress Concern No    Weight Concern Yes     Comment: want bypass    Special Diet No    Back Care No    Exercise No    Bike Helmet No    Seat Belt No    Self-Exams No   Social History Narrative    Not on file     Social Determinants of Health     Financial Resource Strain: Not on file   Food Insecurity: Not on file   Transportation Needs: Not on file   Physical Activity: Not on file   Stress: Not on file   Social Connections: Not on file   Intimate Partner Violence: Not on file   Housing Stability: Not on file       FHX:   Family History   Problem Relation Age of Onset    Hypertension Mother     COPD Mother     Other Mother         Poly? nervous system    Cancer Father         rare form bone     Colon Polyps Sister     Colon Polyps Maternal Aunt        Allergy:   Allergies   Allergen Reactions    Compazine [Prochlorperazine] Hives       Home Medications:     Medications Prior to Admission   Medication Sig    montelukast (Singulair) 10 mg tablet Take 10 mg by mouth in the morning. famotidine (Pepcid) 20 mg tablet Take 20 mg by mouth two (2) times a day. latanoprostene bunod (Vyzulta) 0.024 % drop Apply 1 Drop to eye nightly. Both eyes    meloxicam (MOBIC) 15 mg tablet Take 1 Tablet by mouth daily.  (Patient taking differently: Take 15 mg by mouth as needed.)    acetaminophen (Tylenol Extra Strength) 500 mg tablet Take 2 Tabs by mouth every six (6) hours as needed for Pain. brinzolamide-brimonidine (Simbrinza) 1-0.2 % drps Administer 1 Drop to both eyes daily. cetirizine (ZYRTEC) 10 mg tablet Take 10 mg by mouth nightly. meclizine (ANTIVERT) 25 mg tablet Take 1 tablet by mouth every 6 hours for the next 3 days. Then stop taking the meclizine. Restart the meclizine for 3 day intervals if vertigo/ dizziness returns. olmesartan-hydroCHLOROthiazide (BENICAR HCT) 40-25 mg per tablet Take 1 Tab by mouth daily. Indications: high blood pressure    verapamil (CALAN) 120 mg tablet TAKE ONE TABLET BY MOUTH TWICE A DAY    albuterol (PROAIR HFA) 90 mcg/actuation inhaler Take 1-2 Puffs by inhalation every four (4) hours as needed for Wheezing. fluticasone propion-salmeterol (ADVAIR) 100-50 mcg/dose diskus inhaler Take 1 Puff by inhalation every twelve (12) hours. ergocalciferol (VITAMIN D2) 50,000 unit capsule TAKE ONE CAPSULE BY MOUTH EVERY 7 DAYS       Review of Systems:     A complete 10 point review of systems was performed and pertinents are as per the HPI. Remainder of the review of systems was negative. Visit Vitals  /72 (BP 1 Location: Right arm, BP Patient Position: At rest)   Pulse 83   Temp 98 °F (36.7 °C)   Resp 20   Ht 5' 7\" (1.702 m)   Wt 105.7 kg (233 lb)   SpO2 98%   Breastfeeding No   BMI 36.49 kg/m²       Physical Assessment:     General: alert, cooperative, no acute distress, appears stated age. HEENT: normocephalic, no scleral icterus, moist mucous membranes, EOMs intact, no neck masses noted. Respiratory: lungs clear to auscultation bilaterally. Cardiovascular: regular rate and rhythm, no murmurs, rubs or gallops. Abdomen: normal bowel sounds, soft, non-tender to palpation. Extremities: no lower extremity edema, no cyanosis or clubbing.   Neuro: alert and oriented x 3; non-focal exam.  Skin: no rashes. Psych: normal mood and affect. Antonieta Solano MD  [facility]  [email or web address]  Phone: [phone]  Pager: [phone]  [email or web address]

## 2022-08-11 NOTE — PROCEDURES
[email or web address]  [place], [address]      Brief Procedure Note    Trey Willingham  [birth year]  [id number]    Date of Procedure: 8/11/2022    Preoperative diagnosis: BMI 38.0 - 38.9, adult:  Z68.38  Smoker:  305.1 - F17.200  Diarrhea:  787.91 - R19.7  Abdominal pain:  R10.9  Cirrhosis:  571.5 - K74.60  Elevated blood pressure:  796.2 - R03.0  Hepatic fibrosis:  571.5 - K74.0  Hepatitis C, chronic:  070.54 - C05.0  Non-alcoholic fatty liver:  244.5 - K76.0  Personal history of colonic polyps:  V12.72 - Z86.010    Postoperative diagnosis: EGD: normal  [place]: Diverticulosis, descending polyp x1, hermorrhoids     Type of Anesthesia: MAC (Monitored anesthesia care)    Description of findings: same as post op dx    Procedure: Procedure(s):  UPPER ENDOSCOPY  COLONOSCOPY/ Polypectomy    :  Dr. Homar Quinones MD    Assistant(s): Endoscopy Technician-1: Gopal Daley Staff: Jackie Tam RN    EBL:None    Specimens:   ID Type Source Tests Collected by Time Destination   1 : descending polyp Preservative Colon, Descending  Betzy Hudson MD 8/11/2022 0919 Pathology       Findings: See printed and scanned procedure note    Complications: None    Dr. Homar Quinones MD  8/11/2022  9:34 AM

## 2022-08-11 NOTE — ANESTHESIA POSTPROCEDURE EVALUATION
Procedure(s):  UPPER ENDOSCOPY  COLONOSCOPY/ Polypectomy.     MAC    Anesthesia Post Evaluation      Multimodal analgesia: multimodal analgesia used between 6 hours prior to anesthesia start to PACU discharge  Patient location during evaluation: PACU  Patient participation: complete - patient participated  Level of consciousness: awake  Pain score: 1  Pain management: adequate  Airway patency: patent  Anesthetic complications: no  Cardiovascular status: acceptable  Respiratory status: acceptable  Hydration status: acceptable  Post anesthesia nausea and vomiting:  none  Final Post Anesthesia Temperature Assessment:  Normothermia (36.0-37.5 degrees C)      INITIAL Post-op Vital signs:   Vitals Value Taken Time   BP 99/68 08/11/22 1008   Temp 36.5 °C (97.7 °F) 08/11/22 1008   Pulse 69 08/11/22 1008   Resp 16 08/11/22 1008   SpO2 98 % 08/11/22 1008

## 2022-09-09 ENCOUNTER — OFFICE VISIT (OUTPATIENT)
Dept: ORTHOPEDIC SURGERY | Age: 67
End: 2022-09-09
Payer: MEDICAID

## 2022-09-09 VITALS — HEIGHT: 67 IN | BODY MASS INDEX: 36.88 KG/M2 | WEIGHT: 235 LBS

## 2022-09-09 DIAGNOSIS — M25.561 CHRONIC PAIN OF RIGHT KNEE: Primary | ICD-10-CM

## 2022-09-09 DIAGNOSIS — G89.29 CHRONIC PAIN OF RIGHT KNEE: Primary | ICD-10-CM

## 2022-09-09 DIAGNOSIS — M17.11 PRIMARY OSTEOARTHRITIS OF RIGHT KNEE: ICD-10-CM

## 2022-09-09 PROCEDURE — 99214 OFFICE O/P EST MOD 30 MIN: CPT | Performed by: ORTHOPAEDIC SURGERY

## 2022-09-09 PROCEDURE — 73564 X-RAY EXAM KNEE 4 OR MORE: CPT | Performed by: ORTHOPAEDIC SURGERY

## 2022-09-09 NOTE — PROGRESS NOTES
Patient: Jaja Camarena                MRN: [id number]       SSN: [id number]  YOB: 1955        AGE: 61 y.o. SEX: female  Body mass index is 36.81 kg/m². PCP: Wes Hanson MD  09/09/22    's is Tamy Brady returns for reevaluation of bilateral knee pain I gave her some injections last year they are actually very helpful the right is a little worse than the left she is had some issues with liver problems and hepatitis in the past and wanted to get her knees checked out today should also like to live on the main floor in her apartment complex life certainly agree with that she is can have difficulties with stairs and kneeling getting up from a chair reports a little bit of night pain as well denies fevers or chills and otherwise has been feeling well. The examination today body mass index is about 37 slight peripheral edema distally slight venous stasis hips rotate adequately both knees are in varus calf nontender Homans' sign negative just mildly antalgic gait owing to the affected extremity but not severely so and Homans' sign is negative.     Review of x-rays today 9/9/2022 we have 4 views of the right knee confirming advanced to severe arthritis looks like some osteoporosis as well I recommend a DEXA scan she does not want injections today were also giving her a cane and a DMV form as well and I will see her back to review the DEXA scan with her in about a month thank you    REVIEW OF SYSTEMS:      CON: negative  EYE: negative   ENT: negative  RESP: negative  GI:    negative   :  negative  MSK: Positive  A twelve point review of systems was completed, positives noted and all other systems were reviewed and are negative          Past Medical History:   Diagnosis Date    Abnormal liver enzymes 06/2018    Arthritis of knee, left 2007    Chronic obstructive pulmonary disease ([address]     mild    Cirrhosis of liver [address] 01/24/2018    Stage F2 fibrosis,compensated    Diverticulosis of sigmoid colon 10/2017    Mild diverticulosis    Erythrasma May 2014    beba feet, lower left leg    GERD (gastroesophageal reflux disease)     Glaucoma     H/O mammogram 03/01/2016    normal, f/u in 1 year    Heartburn 2018    Hepatitis C 2002    genotype 1a, previous IV drug user, declined interferons Completed HepC 2019    Hypertension 2002    Hypomagnesemia 11/18/2015    Migraine 84/48/1508    Nonalcoholic fatty liver disease     Obesity 08/2018    Positive FIT (fecal immunochemical test) 07/26/2017    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 concern for Necrolytic acral erythema    Tobacco use     working with [facility] Pul[facility] smoking cessation        Family History   Problem Relation Age of Onset    Hypertension Mother     COPD Mother     Other Mother         Poly? nervous system    Cancer Father         rare form bone     Colon Polyps Sister     Colon Polyps Maternal Aunt        Current Outpatient Medications   Medication Sig Dispense Refill    montelukast (SINGULAIR) 10 mg tablet Take 10 mg by mouth in the morning. famotidine (PEPCID) 20 mg tablet Take 20 mg by mouth two (2) times a day. latanoprostene bunod (Vyzulta) 0.024 % drop Apply 1 Drop to eye nightly. Both eyes      acetaminophen (Tylenol Extra Strength) 500 mg tablet Take 2 Tabs by mouth every six (6) hours as needed for Pain. 20 Tab 0    brinzolamide-brimonidine (Simbrinza) 1-0.2 % drps Administer 1 Drop to both eyes daily. cetirizine (ZYRTEC) 10 mg tablet Take 10 mg by mouth nightly. meclizine (ANTIVERT) 25 mg tablet Take 1 tablet by mouth every 6 hours for the next 3 days. Then stop taking the meclizine. Restart the meclizine for 3 day intervals if vertigo/ dizziness returns. 20 Tab 0    olmesartan-hydroCHLOROthiazide (BENICAR HCT) 40-25 mg per tablet Take 1 Tab by mouth daily.  Indications: high blood pressure 30 Tab 6    verapamil (CALAN) 120 mg tablet TAKE ONE TABLET BY MOUTH TWICE A DAY 60 Tab 6    albuterol (PROAIR HFA) 90 mcg/actuation inhaler Take 1-2 Puffs by inhalation every four (4) hours as needed for Wheezing. 1 Inhaler 6    fluticasone propion-salmeterol (ADVAIR) 100-50 mcg/dose diskus inhaler Take 1 Puff by inhalation every twelve (12) hours. 1 Inhaler 6    ergocalciferol (VITAMIN D2) 50,000 unit capsule TAKE ONE CAPSULE BY MOUTH EVERY 7 DAYS 4 Cap 11       Allergies   Allergen Reactions    Compazine [Prochlorperazine] Hives       Past Surgical History:   Procedure Laterality Date    COLONOSCOPY N/A 10/11/2017    COLONOSCOPY performed by Cady Abdalla MD at [address] [facility] N/A 8/11/2022    COLONOSCOPY/ Polypectomy performed by Pancho Marr MD at [facility]  2006    laparoscopic    HX COLONOSCOPY  2012    hx of polyps, [place]    HX COLONOSCOPY  10/2017     Dr. Molina Patel recommends your next colonoscopy in 5 years.     HX OTHER SURGICAL  2002    liver biopsy    HX PREMALIG/BENIGN SKIN LESION EXCISION  07/24/2019    right ear cyst removed    HX TUBAL LIGATION         Social History     Socioeconomic History    Marital status:      Spouse name: Not on file    Number of children: 2    Years of education: Not on file    Highest education level: Not on file   Occupational History     Employer: [employer]    Tobacco Use    Smoking status: Every Day     Packs/day: 0.50     Types: Cigarettes     Start date: 1/12/1974    Smokeless tobacco: Never   Vaping Use    Vaping Use: Never used   Substance and Sexual Activity    Alcohol use: No    Drug use: Not Currently     Types: Cocaine     Comment: IV crack cocaine, in remission since 2002    Sexual activity: Yes     Partners: Male   Other Topics Concern     Service No    Blood Transfusions No    Caffeine Concern No    Occupational Exposure No    Hobby Hazards No    Sleep Concern No    Stress Concern No    Weight Concern Yes     Comment: want bypass    Special Diet No Back Care No    Exercise No    Bike Helmet No    Seat Belt No    Self-Exams No   Social History Narrative    Not on file     Social Determinants of Health     Financial Resource Strain: Not on file   Food Insecurity: Not on file   Transportation Needs: Not on file   Physical Activity: Not on file   Stress: Not on file   Social Connections: Not on file   Intimate Partner Violence: Not on file   Housing Stability: Not on file       Visit Vitals  Ht 5' 7\" (1.702 m)   Wt 106.6 kg (235 lb)   BMI 36.81 kg/m²         PHYSICAL EXAMINATION:  GENERAL: Alert and oriented x3, in no acute distress, well-developed, well-nourished, afebrile. HEART: No JVD. EYES: No scleral icterus   NECK: No significant lymphadenopathy   LUNGS: No respiratory compromise or indrawing  ABDOMEN: Soft, non-tender, non-distended. Note: This note was completed using voice recognition software. Any typographical/name errors or mistakes are unintentional.    Electronically signed by:  Ronda Perez MD

## 2022-09-09 NOTE — LETTER
9/9/2022 9:00 AM    Ms. Keanu Rivas  [address] [place] [id number]      To whom it may concern,     Ms. Verenice Narayanan is currently under our care.  Due to her medical condition we recommend her living on the lower level to provide some relief on her lower extremities         Sincerely,      Martin Ho MD

## 2022-09-29 ENCOUNTER — TRANSCRIBE ORDER (OUTPATIENT)
Dept: SCHEDULING | Age: 67
End: 2022-09-29

## 2022-09-29 DIAGNOSIS — K74.60 CIRRHOSIS (HCC): Primary | ICD-10-CM

## 2022-10-06 ENCOUNTER — HOSPITAL ENCOUNTER (OUTPATIENT)
Dept: BONE DENSITY | Age: 67
Discharge: HOME | End: 2022-10-06
Attending: ORTHOPAEDIC SURGERY
Payer: MEDICAID

## 2022-10-06 ENCOUNTER — HOSPITAL ENCOUNTER (OUTPATIENT)
Dept: ULTRASOUND IMAGING | Age: 67
Discharge: HOME | End: 2022-10-06
Attending: NURSE PRACTITIONER
Payer: MEDICAID

## 2022-10-06 DIAGNOSIS — G89.29 CHRONIC PAIN OF RIGHT KNEE: ICD-10-CM

## 2022-10-06 DIAGNOSIS — K74.60 CIRRHOSIS (HCC): ICD-10-CM

## 2022-10-06 DIAGNOSIS — M25.561 CHRONIC PAIN OF RIGHT KNEE: ICD-10-CM

## 2022-10-06 DIAGNOSIS — M17.11 PRIMARY OSTEOARTHRITIS OF RIGHT KNEE: ICD-10-CM

## 2022-10-06 PROCEDURE — 77080 DXA BONE DENSITY AXIAL: CPT

## 2022-10-06 PROCEDURE — 76705 ECHO EXAM OF ABDOMEN: CPT

## 2022-11-14 ENCOUNTER — TRANSCRIBE ORDER (OUTPATIENT)
Dept: SCHEDULING | Age: 67
End: 2022-11-14

## 2022-11-14 DIAGNOSIS — Z86.010 PERSONAL HISTORY OF COLONIC POLYPS: ICD-10-CM

## 2022-11-14 DIAGNOSIS — R12 HEARTBURN: ICD-10-CM

## 2022-11-14 DIAGNOSIS — K74.60 CIRRHOSIS (HCC): ICD-10-CM

## 2022-11-14 DIAGNOSIS — B18.2 HEPATITIS C CARRIER (HCC): ICD-10-CM

## 2022-11-25 ENCOUNTER — HOSPITAL ENCOUNTER (OUTPATIENT)
Dept: LAB | Age: 67
Discharge: HOME | End: 2022-11-25
Attending: NURSE PRACTITIONER

## 2022-11-25 ENCOUNTER — HOSPITAL ENCOUNTER (OUTPATIENT)
Dept: ULTRASOUND IMAGING | Age: 67
Discharge: HOME | End: 2022-11-25
Attending: NURSE PRACTITIONER

## 2022-11-25 DIAGNOSIS — K74.60 CIRRHOSIS (HCC): ICD-10-CM

## 2022-11-25 DIAGNOSIS — B18.2 HEPATITIS C CARRIER (HCC): ICD-10-CM

## 2022-11-25 DIAGNOSIS — R12 HEARTBURN: ICD-10-CM

## 2022-11-25 DIAGNOSIS — Z86.010 PERSONAL HISTORY OF COLONIC POLYPS: ICD-10-CM

## 2022-11-25 LAB — XX-LABCORP SPECIMEN COL,LCBCF: NORMAL

## 2022-11-25 PROCEDURE — 76705 ECHO EXAM OF ABDOMEN: CPT

## 2022-11-25 PROCEDURE — 99001 SPECIMEN HANDLING PT-LAB: CPT

## 2022-12-07 ENCOUNTER — OFFICE VISIT (OUTPATIENT)
Dept: ORTHOPEDIC SURGERY | Age: 67
End: 2022-12-07
Payer: MEDICARE

## 2022-12-07 VITALS — BODY MASS INDEX: 36.88 KG/M2 | WEIGHT: 235 LBS | TEMPERATURE: 97.1 F | HEIGHT: 67 IN

## 2022-12-07 DIAGNOSIS — M17.11 PRIMARY OSTEOARTHRITIS OF RIGHT KNEE: Primary | ICD-10-CM

## 2022-12-07 DIAGNOSIS — G89.29 CHRONIC PAIN OF RIGHT KNEE: ICD-10-CM

## 2022-12-07 DIAGNOSIS — M77.51 RIGHT ANKLE TENDONITIS: ICD-10-CM

## 2022-12-07 DIAGNOSIS — M25.571 RIGHT ANKLE PAIN, UNSPECIFIED CHRONICITY: ICD-10-CM

## 2022-12-07 DIAGNOSIS — M25.561 CHRONIC PAIN OF RIGHT KNEE: ICD-10-CM

## 2022-12-07 PROCEDURE — 73610 X-RAY EXAM OF ANKLE: CPT | Performed by: PHYSICIAN ASSISTANT

## 2022-12-07 PROCEDURE — G8756 NO BP MEASURE DOC: HCPCS | Performed by: PHYSICIAN ASSISTANT

## 2022-12-07 PROCEDURE — G9899 SCRN MAM PERF RSLTS DOC: HCPCS | Performed by: PHYSICIAN ASSISTANT

## 2022-12-07 PROCEDURE — G8427 DOCREV CUR MEDS BY ELIG CLIN: HCPCS | Performed by: PHYSICIAN ASSISTANT

## 2022-12-07 PROCEDURE — G8417 CALC BMI ABV UP PARAM F/U: HCPCS | Performed by: PHYSICIAN ASSISTANT

## 2022-12-07 PROCEDURE — 3017F COLORECTAL CA SCREEN DOC REV: CPT | Performed by: PHYSICIAN ASSISTANT

## 2022-12-07 PROCEDURE — 99214 OFFICE O/P EST MOD 30 MIN: CPT | Performed by: PHYSICIAN ASSISTANT

## 2022-12-07 PROCEDURE — G8432 DEP SCR NOT DOC, RNG: HCPCS | Performed by: PHYSICIAN ASSISTANT

## 2022-12-07 RX ORDER — DICLOFENAC SODIUM 10 MG/G
4 GEL TOPICAL 4 TIMES DAILY
Qty: 500 G | Refills: 2 | Status: SHIPPED | OUTPATIENT
Start: 2022-12-07

## 2022-12-07 NOTE — PROGRESS NOTES
[address] [place] [address]  [phone]           Patient: Taj Ralph                MRN: [id number]       SSN: [id number]  YOB: 1955        AGE: 61 y.o. SEX: female  Body mass index is 36.81 kg/m². PCP: Mario Catalan MD  12/07/22      This office note has been dictated. REVIEW OF SYSTEMS:  Constitutional: Negative for fever, chills, weight loss and malaise/fatigue. HENT: Negative. Eyes: Negative. Respiratory: Negative. Cardiovascular: Negative. Gastrointestinal: No bowel incontinence or constipation. Genitourinary: No bladder incontinence or saddle anesthesia. Skin: Negative. Neurological: Negative. Endo/Heme/Allergies: Negative. Psychiatric/Behavioral: Negative. Musculoskeletal: As per HPI above. Past Medical History:   Diagnosis Date    Abnormal liver enzymes 06/2018    Arthritis of knee, left 2007    Chronic obstructive pulmonary disease ([address]     mild    Cirrhosis of liver ([address] 01/24/2018    Stage F2 fibrosis,compensated    Diverticulosis of sigmoid colon 10/2017    Mild diverticulosis    Erythrasma May 2014    beba feet, lower left leg    GERD (gastroesophageal reflux disease)     Glaucoma     H/O mammogram 03/01/2016    normal, f/u in 1 year    Heartburn 2018    Hepatitis C 2002    genotype 1a, previous IV drug user, declined interferons Completed HepC 2019    Hypertension 2002    Hypomagnesemia 11/18/2015    Migraine 54/63/2495    Nonalcoholic fatty liver disease     Obesity 08/2018    Positive FIT (fecal immunochemical test) 07/26/2017    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  concern for Necrolytic acral erythema    Tobacco use     [facility] smoking cessation          Current Outpatient Medications:     montelukast (SINGULAIR) 10 mg tablet, Take 10 mg by mouth in the morning., Disp: , Rfl:     famotidine (PEPCID) 20 mg tablet, Take 20 mg by mouth two (2) times a day., Disp: , Rfl:     latanoprostene bunod (Vyzulta) 0.024 % drop, Apply 1 Drop to eye nightly. Both eyes, Disp: , Rfl:     acetaminophen (Tylenol Extra Strength) 500 mg tablet, Take 2 Tabs by mouth every six (6) hours as needed for Pain., Disp: 20 Tab, Rfl: 0    brinzolamide-brimonidine (Simbrinza) 1-0.2 % drps, Administer 1 Drop to both eyes daily. , Disp: , Rfl:     cetirizine (ZYRTEC) 10 mg tablet, Take 10 mg by mouth nightly., Disp: , Rfl:     meclizine (ANTIVERT) 25 mg tablet, Take 1 tablet by mouth every 6 hours for the next 3 days. Then stop taking the meclizine. Restart the meclizine for 3 day intervals if vertigo/ dizziness returns. , Disp: 20 Tab, Rfl: 0    olmesartan-hydroCHLOROthiazide (BENICAR HCT) 40-25 mg per tablet, Take 1 Tab by mouth daily. Indications: high blood pressure, Disp: 30 Tab, Rfl: 6    verapamil (CALAN) 120 mg tablet, TAKE ONE TABLET BY MOUTH TWICE A DAY, Disp: 60 Tab, Rfl: 6    albuterol (PROAIR HFA) 90 mcg/actuation inhaler, Take 1-2 Puffs by inhalation every four (4) hours as needed for Wheezing., Disp: 1 Inhaler, Rfl: 6    fluticasone propion-salmeterol (ADVAIR) 100-50 mcg/dose diskus inhaler, Take 1 Puff by inhalation every twelve (12) hours. , Disp: 1 Inhaler, Rfl: 6    ergocalciferol (VITAMIN D2) 50,000 unit capsule, TAKE ONE CAPSULE BY MOUTH EVERY 7 DAYS, Disp: 4 Cap, Rfl: 11    Allergies   Allergen Reactions    Compazine [Prochlorperazine] Hives       Social History     Socioeconomic History    Marital status:      Spouse name: Not on file    Number of children: 2    Years of education: Not on file    Highest education level: Not on file   Occupational History     Employer: [employer]    Tobacco Use    Smoking status: Every Day     Packs/day: 0.50     Types: Cigarettes     Start date: 1/12/1974    Smokeless tobacco: Never   Vaping Use    Vaping Use: Never used   Substance and Sexual Activity    Alcohol use: No    Drug use: Not Currently     Types: Cocaine     Comment: IV crack cocaine, in remission since 2002    Sexual activity: Yes     Partners: Male   Other Topics Concern     Service No    Blood Transfusions No    Caffeine Concern No    Occupational Exposure No    Hobby Hazards No    Sleep Concern No    Stress Concern No    Weight Concern Yes     Comment: want bypass    Special Diet No    Back Care No    Exercise No    Bike Helmet No    Seat Belt No    Self-Exams No   Social History Narrative    Not on file     Social Determinants of Health     Financial Resource Strain: Not on file   Food Insecurity: Not on file   Transportation Needs: Not on file   Physical Activity: Not on file   Stress: Not on file   Social Connections: Not on file   Intimate Partner Violence: Not on file   Housing Stability: Not on file       Past Surgical History:   Procedure Laterality Date    COLONOSCOPY N/A 10/11/2017    COLONOSCOPY performed by Jewel Loya MD at [facility] [address] N/A 8/11/2022    COLONOSCOPY/ Polypectomy performed by Hero Dykes MD at [address] [place] [address]    laparoscopic    HX COLONOSCOPY  2012    hx of polyps, [place]    HX COLONOSCOPY  10/2017     Dr. Jc Ruiz recommends your next colonoscopy in 5 years. HX OTHER SURGICAL  2002    liver biopsy    HX PREMALIG/BENIGN SKIN LESION EXCISION  07/24/2019    right ear cyst removed    HX TUBAL LIGATION           Patient seen and evaluated today for bone density test as well as review of complaints of right ankle pain. She denies any injuries or falls. She reports some discomfort at times with ambulation. She reports some darkening of the skin. She denies diabetes. Patient denies recent fevers, chills, chest pain, SOB, or injuries. No recent systemic changes noted. A 12-point review of systems is performed today. Pertinent positives are noted.   All other systems reviewed and otherwise are negative. Physical exam: General: Alert and oriented x3, nad.  well-developed, well nourished. normal affect, AF. NC/AT, EOMI, neck supple, trachea midline, no JVD present. Breathing is non-labored. Examination of lower extremities reveals pain-free range of motion the hips are there is no pain to palpation the trochanter bursa. Neck straight leg raise. Negative calf tenderness. Negative Homans. No signs of DVT present. The right ankle reveals skin intact. There is no erythema or ecchymosis noted. She does have evidence for some venous insufficiency. She does have discomfort to palpation of the posterior tibial tendon. Negative anterior drawer. Negative talar tilt. Negative malleoli compression. Distal pulses 2+ to dorsalis pedis and posterior tibial.    Radiographs obtained in office today 12/7/2022 at the [facility] location including 3 views of the right ankle shows no acute bony abnormalities. Review of bone density test is consistent with osteopenia    Assessment: #1 right lower extremity venous insufficiency, #2 right lower extremity posterior tibial tendinitis, #3 osteopenia    Plan: At this point, we discussed treatment options. The patient is currently on vitamin D however advised her to also take calcium at least 1500 mg a day. We will get her into physical therapy for her posterior tibial tendinitis. A prescription for Voltaren gel be sent to her pharmacy. She is instructed on use as well as use precautions. A referral for vascular reviewed place given her venous insufficiency. We will see her back in about 6 weeks time for evaluation. We may consider an MRI of the ankle at that point.               JR Gelacio LAKHANI, KEERTHI, ATC

## 2022-12-13 ENCOUNTER — TELEPHONE (OUTPATIENT)
Dept: SURGERY | Age: 67
End: 2022-12-13

## 2022-12-13 NOTE — TELEPHONE ENCOUNTER
Spoke with patient re: [facility] referral, patient interested in both weight loss programs sending patient orientation link for Non Surgical as well as online seminar video for surgical.

## 2023-01-18 ENCOUNTER — HOSPITAL ENCOUNTER (OUTPATIENT)
Dept: PHYSICAL THERAPY | Age: 68
Discharge: HOME | End: 2023-01-18
Payer: MEDICARE

## 2023-01-18 PROCEDURE — 97162 PT EVAL MOD COMPLEX 30 MIN: CPT

## 2023-01-18 NOTE — PROGRESS NOTES
[facility] [place] [address]  [address], [address] [phone] fax    Plan of Care/ Statement of Necessity for Physical Therapy Services           Patient name: Randolph Allen Start of Care:  2023   Referral source: Chirag Bautista : 1955    Medical Diagnosis: Pain in right ankle [M25.571]  Payor: BLUE CROSS MEDICARE / Plan: Consuelo Hastings 8141 HMO / Product Type: Managed Care Medicare /  Onset Date:  ~10/15/2022    Treatment Diagnosis: same; right tibialis tendonitis   Prior Hospitalization: see medical history Provider#: [id number]   Medications: Verified on Patient summary List    Comorbidities:  Arthritis; HTN; Hepatitis C; Tobacco use; Glaucoma; GERD; Osteopenia   Prior Level of Function:  Independent ADLs, work, household chores, community mobility, , and sleep without restriction due to right ankle pain. The Plan of Care and following information is based on the information from the initial evaluation. Assessment/ key information:  Patient is a 74year old female referred to PT by her orthopedist after evaluation 22; xrays negative; patient also referred for vascular consult/testing. Today in PT, patient reports onset of right medial ankle pain about 2-3 months ago without known injury or precipitating circumstances. Pt does not think that she has any right ankle swelling, but she does report tenderness in the area of her pain with darkening of th skin. Pt takes Tylenol for knee pain and right ankle pain; she uses ice on her right ankle for pain relief. Pt works at a [employer] and is on her feet walking a lot on concrete floors; some sitting for desk work. Pt has 13 steps up to her apartment, which she can sometimes go up and/or down reciprocally and at other times one at a time due to her knee pains and/or right ankle pain.   Pt sometimes watches young grandchildren and she cannot carry the infant up or down stairs. Pt reports that her right ankle pain can sometimes awaken her from sleeping and it is stiff in the mornings. Pt breaks up household chores with periods of rest due to her knees and/or ankle pain. Examination finds difficulty and discomfort with transfers, discomfort with right SLS and unsteady balance left and right. Heel raises in SLS are weak on right more so than left and increased right ankle pain; heel walking is weak on left more so than right and discomfort right ankle. Pt only squats to about 25% depth, but she is able to bend over to retrieve small items from the floor. Gait is slowed with bilat toe out, mild lateral lurching, and report of right ankle discomfort in late stance/push-off. Right ankle AROM is decreased and causes increased ankle discomfort; right ankle INV and EV MMT are 4+/5 with discomfort. Bilat gastroc tightness and right first toe MTP joint is very limited for both DF and PF. Tender to palpation about right medial malleolus and distal half of right medial leg. Pain Scale rating = 0 up to 8/10. FOTO = 40. Patient would benefit from skilled PT to decrease pain and to address these deficits to restore PLOF and to establish HEP and Self-Care routine for management and prevention of recurrence. Evaluation Complexity History HIGH Complexity :3+ comorbidities / personal factors will impact the outcome/ POC ; Examination MEDIUM Complexity : 3 Standardized tests and measures addressing body structure, function, activity limitation and / or participation in recreation  ;Presentation MEDIUM Complexity : Evolving with changing characteristics  ; Clinical Decision Making MEDIUM Complexity : FOTO score of 26-74  Overall Complexity Rating: MEDIUM  Problem List: pain affecting function, decrease ROM, decrease strength, impaired gait/ balance, decrease ADL/ functional abilitiies, decrease activity tolerance, decrease flexibility/ joint mobility, and decrease transfer abilities   Treatment Plan may include any combination of the following: Therapeutic exercise, Neuromuscular reeducation, Manual therapy, Therapeutic activity, Self care/home management, Electric stim unattended , Gait training, Ultrasound, and Other: Iontophoresis; MHP/CP prn  Patient / Family readiness to learn indicated by: asking questions, trying to perform skills, and interest  Persons(s) to be included in education: patient (P)  Barriers to Learning/Limitations: None  Patient Goal (s): relief from pain  Patient Self Reported Health Status: Patient did not answer on intake form  Rehabilitation Potential: good  Short Term Goals: To be accomplished in 3-4 weeks:  Patient Independent and compliant with HEP and self-care routine. Eval:  No HEP. Decrease max pain scale rating by >/= 4 points. Eval:  up to 10/10. Increase right ankle AROM by >/= 10 degrees. Eval:  DF 5-6, PF 35, INV 25, and EV 5-6 deg. Increase right ankle MMT for EV and INV to >/= 5-/5; heel raises in SLS to >/= 4 to 4+/5. Eval:  INV and EV = 4+/5; right Heel Raises = 3+ to 4-/5  Long Term Goals: To be accomplished in 4-6 weeks:  Improve FOTO to >/= 59. Eval:  40  Increase right first MTP joint DF to >/= 40 deg. Eval:  about 10-15 deg. Increase bilat gastroc flexibility by >/= 10-12 deg. Eval:  right to -2 deg. No/minimal tenderness to palpation. Eval:  tender about right medial ankle/malleous and distal half of medial leg. Frequency / Duration: Patient to be seen 2 times per week for 4-6 weeks. Patient/ Caregiver education and instruction: Diagnosis, prognosis, self care, activity modification, and exercises   [x]  Plan of care has been reviewed with PTA    Certification Dates:  01/18/2023 to 02/16/2022    Zaid Carbone, PT 1/18/2023 12:13 PM    ________________________________________________________________________    I certify that the above Therapy Services are being furnished while the patient is under my care.  I agree with the treatment plan and certify that this therapy is necessary.     [de-identified] Signature:____________Date:_________TIME:________     Chika Sutton PA-C  ** Signature, Date and Time must be completed for valid certification **    Please sign and return to [facility] [address]  [address] [place], [address] [phone] fax

## 2023-01-18 NOTE — PROGRESS NOTES
PT DAILY TREATMENT NOTE     Patient Name: Dwain Maki  Date:  2023  :  1955  [x]  Patient  Verified  Payor: Cristiano Pimentel / Plan: Consuelo Hastings 8141 HMO / Product Type: Managed Care Medicare /    In time:  9:21 AM  Out time:  10:00 AM  Total Treatment Time (min):  49 min. Visit #: 1 of 10    Medicare/BCBS Only   Total Timed Codes (min):  8 1:1 Treatment Time:  49 min. Treatment Area: Pain in right ankle [M25.571]    SUBJECTIVE  Pain Level (0-10 scale):  3/10  Any medication changes, allergies to medications, adverse drug reactions, diagnosis change, or new procedure performed?: [x] No    [] Yes (see summary sheet for update)  Subjective functional status/changes:   [] No changes reported  See Eval/POC. OBJECTIVE    Modality rationale: decrease edema, decrease inflammation, decrease pain, and increase tissue extensibility to improve the patients ability to transfer, stand, walk, squat to perform ADLs, work, chores, community mobility, and sleep with less pain.    Min Type Additional Details    [] Estim:  []Unatt       []IFC  []Premod                        []Other:  []w/ice   []w/heat  Position:  Location:    [] Estim: []Att    []TENS instruct  []NMES                    []Other:  []w/US   []w/ice   []w/heat  Position:  Location:    []  Traction: [] Cervical       []Lumbar                       [] Prone          []Supine                       []Intermittent   []Continuous Lbs:  [] before manual  [] after manual    []  Ultrasound: []Continuous   [] Pulsed                           []1MHz   []3MHz W/cm2:  Location:    []  Iontophoresis with dexamethasone         Location: [] Take home patch   [] In clinic    []  Ice     []  heat  []  Ice massage  []  Laser   []  Anodyne Position:  Location:    []  Laser with stim  []  Other:  Position:  Location:    []  Vasopneumatic Device    []  Right     []  Left  Pre-treatment girth:  Post-treatment girth:  Measured at (location):  Pressure:       [] lo [] med [] hi   Temperature: [] lo [] med [] hi   [] Skin assessment post-treatment:  []intact []redness- no adverse reaction    []redness - adverse reaction:     41 min [x]Eval                  []Re-Eval       8 min Therapeutic Exercise:  [x] See flow sheet :   Rationale: increase ROM, increase strength, and increase proprioception to improve the patients ability to transfer, stand, walk, squat to perform ADLs, work, chores, community mobility, and sleep with less pain. 0 min Therapeutic Activity:  [x]  See flow sheet :   Rationale: increase ROM, increase strength, improve coordination, improve balance, and increase proprioception  to improve the patients ability to transfer, stand, walk, squat to perform ADLs, work, chores, community mobility, and sleep with less pain. 0 min Neuromuscular Re-education:  [x]  See flow sheet :   Rationale: increase strength, improve coordination, improve balance, and increase proprioception  to improve the patients ability to transfer, stand, walk, squat to perform ADLs, work, chores, community mobility, and sleep with less pain. 0 min Manual Therapy:  NA   The manual therapy interventions were performed at a separate and distinct time from the therapeutic activities interventions. Rationale: decrease pain, increase ROM, increase tissue extensibility, and increase postural awareness to transfer, stand, walk, squat to perform ADLs, work, chores, community mobility, and sleep with less pain. 0 min Gait Training:  ___ feet with ___ device on level surfaces with ___ level of assist   Rationale:    0 min Self Care/Home Management: NA   Rationale: increase ROM, increase strength, improve coordination, improve balance, and increase proprioception  to improve the patients ability to transfer, stand, walk, squat to perform ADLs, work, chores, community mobility, and sleep with less pain.           With   [x] TE   [x] TA   [] neuro   [] other: Patient Education: [x] Review HEP    [x] Progressed/Changed HEP based on:   [x] positioning   [x] body mechanics   [] transfers   [x] heat/ice application    [x] other:  Patient briefly instructed in and performed beginning HEP. Handout issued with pictures and written directions; copy placed in chart. Discussed use of heat and ice. Other Objective/Functional Measures:  See Eval/POC. Pain Level (0-10 scale) post treatment:  3/10    ASSESSMENT/Changes in Function:  See Eval/POC. Patient will continue to benefit from skilled PT services to modify and progress therapeutic interventions, address functional mobility deficits, address ROM deficits, address strength deficits, analyze and address soft tissue restrictions, analyze and cue movement patterns, analyze and modify body mechanics/ergonomics, assess and modify postural abnormalities, address imbalance/dizziness, and instruct in home and community integration to attain remaining goals. [x]  See Plan of Care  []  See progress note/recertification  []  See Discharge Summary         Progress towards goals / Updated goals:  See Eval/POC.     PLAN  [x]  Upgrade activities as tolerated     [x]  Continue plan of care  []  Update interventions per flow sheet       []  Discharge due to:_  []  Other:_      Ashley Lizama, PT 1/18/2023  12:17 PM    Future Appointments   Date Time Provider Brady Glynn   2/8/2023  1:00 PM IRASEMA Chand-CNP [facility]

## 2023-01-19 ENCOUNTER — TELEPHONE (OUTPATIENT)
Dept: PHYSICAL THERAPY | Age: 68
End: 2023-01-19

## 2023-02-07 ENCOUNTER — HOSPITAL ENCOUNTER (OUTPATIENT)
Dept: PHYSICAL THERAPY | Age: 68
End: 2023-02-07

## 2023-02-07 ENCOUNTER — TELEPHONE (OUTPATIENT)
Dept: PHYSICAL THERAPY | Age: 68
End: 2023-02-07

## 2023-02-13 ENCOUNTER — HOSPITAL ENCOUNTER (OUTPATIENT)
Facility: HOSPITAL | Age: 68
Setting detail: RECURRING SERIES
End: 2023-02-13
Payer: MEDICARE

## 2023-02-13 DIAGNOSIS — M25.571 RIGHT ANKLE PAIN, UNSPECIFIED CHRONICITY: Primary | ICD-10-CM

## 2023-02-17 ENCOUNTER — APPOINTMENT (OUTPATIENT)
Facility: HOSPITAL | Age: 68
End: 2023-02-17
Payer: MEDICARE

## 2023-02-20 ENCOUNTER — APPOINTMENT (OUTPATIENT)
Facility: HOSPITAL | Age: 68
End: 2023-02-20
Payer: MEDICARE

## 2023-02-21 ENCOUNTER — APPOINTMENT (OUTPATIENT)
Facility: HOSPITAL | Age: 68
End: 2023-02-21
Payer: MEDICARE

## 2023-02-22 ENCOUNTER — HOSPITAL ENCOUNTER (OUTPATIENT)
Facility: HOSPITAL | Age: 68
Setting detail: RECURRING SERIES
Discharge: HOME | End: 2023-02-25
Payer: MEDICARE

## 2023-02-22 PROCEDURE — 97110 THERAPEUTIC EXERCISES: CPT

## 2023-02-22 PROCEDURE — 97530 THERAPEUTIC ACTIVITIES: CPT

## 2023-02-22 NOTE — PROGRESS NOTES
PHYSICAL / OCCUPATIONAL THERAPY - DAILY TREATMENT NOTE (updated )    Patient Name: Jessica Gillespie    Date: 2023    : 1955  Insurance: Payor: Lesvia Fine / Plan: Elly Osborne / Product Type: *No Product type* /      Patient  verified Yes     Visit #   Current / Total 1 8   Time   In / Out 1100 1138   Pain   In / Out 4/10 4/10   Subjective Functional Status/Changes: Pt reports no change since initial evaluation. States she has not been able to attend therapy due to being out of town. Changes to:  Meds, Allergies, Med Hx, Sx Hx? If yes, update Summary List no       TREATMENT AREA =  Pain in right ankle [M25.571]    OBJECTIVE         Therapeutic Procedures: Tx Min Billable or 1:1 Min (if diff from Tx Min) Procedure, Rationale, Specifics   28  [id number] Therapeutic Activity (timed):  use of dynamic activities replicating functional movements to increase ROM, strength, coordination, balance, and proprioception in order to improve patient's ability to progress to PLOF and address remaining functional goals. (see flow sheet as applicable)     Details if applicable:       10  51318 Therapeutic Exercise (timed):  increase ROM, strength, coordination, balance, and proprioception to improve patient's ability to progress to PLOF and address remaining functional goals.  (see flow sheet as applicable)     Details if applicable:            Details if applicable:            Details if applicable:            Details if applicable:     45  MC BC Totals Reminder: bill using total billable min of TIMED therapeutic procedures (example: do not include dry needle or estim unattended, both untimed codes, in totals to left)  8-22 min = 1 unit; 23-37 min = 2 units; 38-52 min = 3 units; 53-67 min = 4 units; 68-82 min = 5 units   Total Total     [x]  Patient Education billed concurrently with other procedures   [x] Review HEP    [] Progressed/Changed HEP, detail:    [] Other detail:       Objective Information/Functional Measures/Assessment    Right ankle AROM: DF 5-6, PF 35, INV 25, and EV 5-6 deg  Right ankle MMT: INV and EV = 4+/5; right Heel Raises = 4/5  Right first MTP joint DF = 18 deg  Right gastroc flexibility = neutral    Functional Gains: Slight decrease in pain  Functional Deficits: Walking long distances, ascending/descending stairs  % improvement: 0  Pain   Average: 4/10       Best: 3/10     Worst: 8-9/10  Patient Goal: \"I just want my ankle to feel better. I want to be able to walk a mile and go up and down stairs without my ankle hurting\"      Patient will continue to benefit from skilled PT / OT services to modify and progress therapeutic interventions, analyze and address functional mobility deficits, analyze and address ROM deficits, analyze and address strength deficits, and analyze and address soft tissue restrictions to address functional deficits and attain remaining goals. Progress toward goals / Updated goals:  [x]  See Progress Note/Recertification    Short Term Goals: To be accomplished in 3-4 weeks:  Patient Independent and compliant with HEP and self-care routine. Eval:  No HEP. Not met: Non compliant 2/22/23  Decrease max pain scale rating by >/= 4 points. Eval:  up to 10/10. Not met: reports 8-9/10 pain, 2/22/23  Increase right ankle AROM by >/= 10 degrees. Eval:  DF 5-6, PF 35, INV 25, and EV 5-6 deg. Not met: No change DF 5-6, PF 35, INV 25, and EV 5-6 deg, 2/22/23  Increase right ankle MMT for EV and INV to >/= 5-/5; heel raises in SLS to >/= 4 to 4+/5. Eval:  INV and EV = 4+/5; right Heel Raises = 3+ to 4-/5       Not met: INV and EV = 4+/5; right Heel Raises = 4/5, 2/22/23  Long Term Goals: To be accomplished in 4-6 weeks:  Improve FOTO to >/= 59. Eval:  40       Not met: FOTO = 49, 2/22/23  Increase right first MTP joint DF to >/= 40 deg. Eval:  about 10-15 deg. Not met: 18 deg, 2/22/23  Increase bilat gastroc flexibility by >/= 10-12 deg. Eval:  right to -2 deg. Not met but progressing: right to neutral 2/22/23  No/minimal tenderness to palpation. Eval:  tender about right medial ankle/malleous and distal half of medial leg.         Not met: tender right distal leg 2/22/23    PLAN  Yes  Continue plan of care  []  Upgrade activities as tolerated  []  Discharge due to :  []  Other:    Clifford Staples, IMELDA    2/22/2023    11:11 AM    Future Appointments   Date Time Provider Ana Luisa Jones   3/15/2023 11:00 AM ROMERO Weber - CNP [facility]

## 2023-03-15 ENCOUNTER — OFFICE VISIT (OUTPATIENT)
Age: 68
End: 2023-03-15
Payer: MEDICARE

## 2023-03-15 VITALS
TEMPERATURE: 97.3 F | OXYGEN SATURATION: 98 % | DIASTOLIC BLOOD PRESSURE: 68 MMHG | HEART RATE: 89 BPM | BODY MASS INDEX: 36.26 KG/M2 | HEIGHT: 67 IN | SYSTOLIC BLOOD PRESSURE: 106 MMHG | RESPIRATION RATE: 17 BRPM | WEIGHT: 231 LBS

## 2023-03-15 DIAGNOSIS — Z71.3 WEIGHT LOSS COUNSELING, ENCOUNTER FOR: ICD-10-CM

## 2023-03-15 DIAGNOSIS — Z71.3 ENCOUNTER FOR DIETARY CONSULTATION: ICD-10-CM

## 2023-03-15 DIAGNOSIS — E66.01 CLASS 2 SEVERE OBESITY DUE TO EXCESS CALORIES WITH SERIOUS COMORBIDITY AND BODY MASS INDEX (BMI) OF 35.0 TO 35.9 IN ADULT (HCC): Primary | ICD-10-CM

## 2023-03-15 PROCEDURE — 3078F DIAST BP <80 MM HG: CPT | Performed by: NURSE PRACTITIONER

## 2023-03-15 PROCEDURE — 99204 OFFICE O/P NEW MOD 45 MIN: CPT | Performed by: NURSE PRACTITIONER

## 2023-03-15 PROCEDURE — 3074F SYST BP LT 130 MM HG: CPT | Performed by: NURSE PRACTITIONER

## 2023-03-15 PROCEDURE — 1123F ACP DISCUSS/DSCN MKR DOCD: CPT | Performed by: NURSE PRACTITIONER

## 2023-03-15 ASSESSMENT — ENCOUNTER SYMPTOMS
EYES NEGATIVE: 1
RESPIRATORY NEGATIVE: 1
GASTROINTESTINAL NEGATIVE: 1

## 2023-03-15 NOTE — PROGRESS NOTES
Weight Loss Progress Note: Initial Physician Visit      Clare Hampton is a 74 y.o. female with BMI 35.9 who is here for her Initial Evaluation for the medical weight loss LCD program. Patient has not previously done a [facility] program, but wants to lose weight because she has liver problems and the weight loss will help alleviate that. CC: Obesity    Weight History  Current weight 231lb   Goal weight 190lbs  Highest weight 250lbs   (See weight gain time line scanned into media section of chart)    Food intolerances (gas, bloating, diarrhea, vomiting)? None. Weight loss History  How many weight loss attempts have you had? Multiple attempts on her own but none successful. Which program were you most successful doing? Significant Medical History    Have you ever taken appetite suppressants? No.  If yes: Rx or OTC? If yes; Any negative side effects? Ever diagnosed with sleep apnea or put on CPAP? No.    Ever had bariatric surgery?: No.    Pregnant or planning on becoming pregnant w/in 6 months: No.      Significant Psychosocial History   Any history of drug abuse or dependence: No.  Current Major Lifestyle Changes: COPD, Hypertension. Any potential unsupportive people: No.  Why are you starting a weight loss program now? Patient states she has knee pain/swelling and limits her to walk or stand for long periods of time. Patient want's to lose weight to alleviate some joint pain and improve her overall health. Are you ready? Yes. History of binge eating disorder or anorexia:   If yes, are you currently being treated? Social History  Social History     Tobacco Use    Smoking status: Every Day     Packs/day: 0.50     Types: Cigarettes     Start date: 1/12/1974    Smokeless tobacco: Never   Substance Use Topics    Alcohol use: No     How many times a week do you eat out?  4 times weekly. Do you drink any EtOH? No.   If so, how much?   N/A      Exercise  How many days a week do you currently exercise?   0 days. Have you ever been told by a physician not to exercise? No    Objective  Vitals:    03/15/23 1053   BP: 106/68   Position: Sitting   Pulse: 89   Resp: 17   Temp: 97.3 °F (36.3 °C)   TempSrc: Temporal   SpO2: 98%   Weight: 231 lb (104.8 kg)   Height: 5' 7\" (1.702 m)       Arm Circumference: 12in  Waist Circumference: 49in  Thigh Circumference: 20in  Percent Body Fat: 43.8%    No flowsheet data found. Labs: None    Review of Systems  Review of Systems   Constitutional: Negative. HENT: Negative. Eyes: Negative. Respiratory: Negative. Cardiovascular:  Positive for leg swelling. Gastrointestinal: Negative. Genitourinary: Negative. Musculoskeletal:  Positive for myalgias. Skin: Negative. Neurological: Negative. Psychiatric/Behavioral: Negative. Physical Exam    Physical Exam  Constitutional:       Appearance: She is obese. HENT:      Head: Normocephalic and atraumatic. Mouth/Throat:      Mouth: Mucous membranes are moist.   Eyes:      Extraocular Movements: Extraocular movements intact. Pupils: Pupils are equal, round, and reactive to light. Cardiovascular:      Rate and Rhythm: Normal rate and regular rhythm. Pulses: Normal pulses. Heart sounds: Normal heart sounds. Pulmonary:      Effort: Pulmonary effort is normal.      Breath sounds: Normal breath sounds. Abdominal:      Palpations: Abdomen is soft. Musculoskeletal:         General: Normal range of motion. Cervical back: Normal range of motion and neck supple. Skin:     General: Skin is warm and dry. Neurological:      General: No focal deficit present. Mental Status: She is alert and oriented to person, place, and time.    Psychiatric:         Mood and Affect: Mood normal.         Behavior: Behavior normal.          Assessment & Plan  Based on his history and exam, Tanner Paget is a good candidate for the Non-[facility] Weight Loss LCD Program. Patient has not previously done a [facility] program and wants to lose weight now to alleviate problems with her liver and her R knee. Dietary recall reflects that a typical breakfast for the patient consists of scrambled eggs, pruitt, or sausage with a cup of tea. Lunch is typically fast food ordered by her office with items such as fried chicken, fried fish, or other fast foods. Sully Hals is typically a protein such as fried fish, or fried chicken with zenia greens, cabbage, string beans, rice, potatoes, or macaroni and cheese. Patient endorses that she consumes 2 sodas per week and 8-9 glasses of sweet tea per week, along with 32oz of water daily. She states that her food weakness is fried chicken, sweets, and potato chips which she snacks on throughout the day and into the evening as well. She does not currently have a regular exercise regimen due to her knee problems, but she works for [employer] in [place] so she is constantly walking. Discussed with patient that the LCD program would be a good fit for her as it would reduce the number of meals she is responsible for and replace them with the keto based meal replacements, while she focuses on making the one meal she is responsible high in protein and vegetable while minimizing carbohydrates, starches, and sugars. Explained to patient that the program is keto based and once in ketosis the only way to remain there is to avoid carbs/starches/sugars. Encouraged patient to increase her daily water intake to 38oz daily and reduce her consumption of both sodas and sweet tea. Advised patient that since she has difficulty with walking to begin tracking her steps while at work with a target of 6-8k steps daily. Diet Plan: LCD      Activity Plan: Track daily steps with target of 6-8k    Medication Plan: None      There are no Patient Instructions on file for this visit. There were no encounter diagnoses.         ARLENE Patterson

## 2023-03-23 ENCOUNTER — HOSPITAL ENCOUNTER (EMERGENCY)
Facility: HOSPITAL | Age: 68
Discharge: HOME | End: 2023-03-23
Attending: EMERGENCY MEDICINE
Payer: MEDICARE

## 2023-03-23 ENCOUNTER — APPOINTMENT (OUTPATIENT)
Facility: HOSPITAL | Age: 68
End: 2023-03-23
Payer: MEDICARE

## 2023-03-23 VITALS
OXYGEN SATURATION: 100 % | SYSTOLIC BLOOD PRESSURE: 132 MMHG | TEMPERATURE: 97 F | WEIGHT: 228 LBS | RESPIRATION RATE: 16 BRPM | BODY MASS INDEX: 35.79 KG/M2 | HEART RATE: 90 BPM | DIASTOLIC BLOOD PRESSURE: 78 MMHG | HEIGHT: 67 IN

## 2023-03-23 DIAGNOSIS — M17.11 ARTHRITIS OF RIGHT KNEE: Primary | ICD-10-CM

## 2023-03-23 PROCEDURE — 99284 EMERGENCY DEPT VISIT MOD MDM: CPT

## 2023-03-23 PROCEDURE — 93971 EXTREMITY STUDY: CPT

## 2023-03-23 PROCEDURE — 73562 X-RAY EXAM OF KNEE 3: CPT

## 2023-03-23 PROCEDURE — 6370000000 HC RX 637 (ALT 250 FOR IP): Performed by: EMERGENCY MEDICINE

## 2023-03-23 RX ORDER — HYDROCODONE BITARTRATE AND ACETAMINOPHEN 5; 325 MG/1; MG/1
1 TABLET ORAL EVERY 6 HOURS PRN
Qty: 12 TABLET | Refills: 0 | Status: SHIPPED | OUTPATIENT
Start: 2023-03-23 | End: 2023-03-26

## 2023-03-23 RX ORDER — HYDROCODONE BITARTRATE AND ACETAMINOPHEN 5; 325 MG/1; MG/1
1 TABLET ORAL
Status: COMPLETED | OUTPATIENT
Start: 2023-03-23 | End: 2023-03-23

## 2023-03-23 RX ORDER — OXYCODONE HYDROCHLORIDE AND ACETAMINOPHEN 5; 325 MG/1; MG/1
1 TABLET ORAL
Status: DISCONTINUED | OUTPATIENT
Start: 2023-03-23 | End: 2023-03-23

## 2023-03-23 RX ORDER — IBUPROFEN 600 MG/1
600 TABLET ORAL 3 TIMES DAILY PRN
Qty: 30 TABLET | Refills: 0 | Status: SHIPPED | OUTPATIENT
Start: 2023-03-23

## 2023-03-23 RX ADMIN — HYDROCODONE BITARTRATE AND ACETAMINOPHEN 1 TABLET: 5; 325 TABLET ORAL at 20:19

## 2023-03-23 ASSESSMENT — PAIN - FUNCTIONAL ASSESSMENT: PAIN_FUNCTIONAL_ASSESSMENT: 0-10

## 2023-03-23 ASSESSMENT — PAIN DESCRIPTION - DESCRIPTORS: DESCRIPTORS: STABBING

## 2023-03-23 ASSESSMENT — PAIN DESCRIPTION - ORIENTATION: ORIENTATION: RIGHT

## 2023-03-23 ASSESSMENT — PAIN DESCRIPTION - LOCATION
LOCATION: KNEE
LOCATION: KNEE;LEG

## 2023-03-23 ASSESSMENT — PAIN SCALES - GENERAL: PAINLEVEL_OUTOF10: 10

## 2023-03-23 NOTE — ED TRIAGE NOTES
Pt is ambulatory to triage with a limp with complaints of R knee pain. Pt states she has had R knee pain for years but it became severe yesterday. Denies injury, fall, or trauma. Pt states she is also having pain behind her knee.

## 2023-03-24 LAB — ECHO BSA: 2.21 M2

## 2023-03-24 NOTE — ED NOTES
Discharge instructions given to patient. Follow up information given as ordered. Prescriptions called to pharmacy per provider. Verbalized understanding.       Amber Richard RN  03/23/23 2960

## 2023-03-24 NOTE — ED PROVIDER NOTES
`[facility] EMERGENCY DEPT  eMERGENCY dEPARTMENT eNCOUnter      Pt Name: Naman Alvarez  MRN: [id number]  Fishgfautumn 1955 of evaluation: 3/23/2023  Provider:Kobe Patrick MD    CHIEF COMPLAINT         HPI  Naman Alvarez is a 74 y.o. female  c/o having chronic right knee pain and R/O DVT of her right lower leg. ROS  Review of Systems    Except as noted above the remainder of the review of systems was reviewed and negative. PAST MEDICAL HISTORY     Past Medical History:   Diagnosis Date    Abnormal liver enzymes 06/2018    Arthritis of knee, left 2007    Chronic obstructive pulmonary disease ([address]     mild    Cirrhosis of liver ([facility] [address] 01/24/2018    Stage F2 fibrosis,compensated    Diverticulosis of sigmoid colon 10/2017    Mild diverticulosis    Erythrasma May 2014    kirsten feet, lower left leg    GERD (gastroesophageal reflux disease)     Glaucoma     H/O mammogram 03/01/2016    normal, f/u in 1 year    Heartburn 2018    Hepatitis C 2002    genotype 1a, previous IV drug user, declined interferons Completed HepC 2019    Hypertension 2002    Hypomagnesemia 11/18/2015    Migraine 80/30/0345    Nonalcoholic fatty liver disease     Obesity 08/2018    Positive FIT (fecal immunochemical test) 07/26/2017    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 Miladys Burroughs recommends your next colonoscopy in 5 years.     COLONOSCOPY N/A 10/11/2017    COLONOSCOPY performed by Manuel Chang MD at [address] N/A 8/11/2022    COLONOSCOPY/ Polypectomy performed by Neto Diez MD at [facility] ENDOSCOPY    COLONOSCOPY  2012    hx of polyps, [address]  2002    liver biopsy    PRE-MALIGNANT /

## 2023-03-24 NOTE — PROGRESS NOTES
Patient with order for Percocet. Advises she cannot take Oxycodone; leaves her with intense itching but can take Hydrocodone.  Order revised

## 2023-04-10 ASSESSMENT — ENCOUNTER SYMPTOMS: RESPIRATORY NEGATIVE: 1

## 2023-09-20 NOTE — MR AVS SNAPSHOT
[address] [facility] [address] 
[phone] Patient: Kathleen Santana MRN: [id number] BTN:[id number] Visit Information Date & Time Provider Department Dept. Phone Encounter #  
 4/24/2018  2:30 PM Jeanie Gipson NP [address] [facility] [id number] Follow-up Instructions Return in about 6 months (around 10/24/2018), or if symptoms worsen or fail to improve. Your Appointments 5/3/2018  9:00 AM  
PAP/PELVIC with Jeanie Gipson NP [address] [facility] ([address]) Appt Note: Pap/pelvic no labs [facility] [id number] [address] [facility] [address]  
  
    
 10/23/2018 11:00 AM  
Follow Up with Jeanie Gipson NP [address] [facility] ([address]) Appt Note: 6 mth follow up  
 [address] [place], [place] [address] [facility] [address]  
[address] [facility] [address] Upcoming Health Maintenance Date Due Pneumococcal 19-64 Medium Risk (1 of 1 - PPSV23) 12/14/1976 DTaP/Tdap/Td series (1 - Tdap) 12/14/1978 ZOSTER VACCINE AGE 60> 10/14/2017 PAP AKA CERVICAL CYTOLOGY 1/6/2018 FOBT Q 1 YEAR AGE 50-75 7/23/2018 BREAST CANCER SCRN MAMMOGRAM 3/7/2019 Allergies as of 4/24/2018  Review Complete On: 4/24/2018 By: Jeanie Gipson NP Severity Noted Reaction Type Reactions Compazine [Prochlorperazine]  06/07/2013    Hives Current Immunizations  Reviewed on 11/18/2015 Name Date Influenza Vaccine (Quad) PF 10/17/2016, 11/18/2015 Influenza Vaccine PF 10/31/2013 Not reviewed this visit You Were Diagnosed With   
  
 Codes Comments Essential hypertension with goal blood pressure less than 140/90    -  Primary ICD-10-CM: I10 
ICD-9-CM: 401.9 Chronic hepatitis C without hepatic coma (HCC)     ICD-10-CM: B18.2 ICD-9-CM: 070.54 Primary osteoarthritis of both knees     ICD-10-CM: M17.0 ICD-9-CM: 715.16 Vitals BP Pulse Temp Resp Height(growth percentile) Weight(growth percentile) 143/87 (BP 1 Location: Left arm, BP Patient Position: Sitting) 94 98.1 °F (36.7 °C) (Oral) 16 5' 7\" (1.702 m) 232 lb 9.6 oz (105.5 kg) SpO2 BMI OB Status Smoking Status 100% 36.43 kg/m2 Postmenopausal Current Every Day Smoker Vitals History BMI and BSA Data Body Mass Index Body Surface Area  
 36.43 kg/m 2 2.23 m 2 Preferred Pharmacy Pharmacy Name Phone [facility] [address] [place] [address]. [phone] Your Updated Medication List  
  
   
This list is accurate as of 4/24/18  3:04 PM.  Always use your most recent med list.  
  
  
  
  
 albuterol 90 mcg/actuation inhaler Commonly known as:  PROAIR HFA Take 1-2 Puffs by inhalation every four (4) hours as needed for Wheezing. clobetasol 0.05 % ointment Commonly known as:  Lisabeth Puls Apply  to affected area two (2) times a day. dexlansoprazole 30 mg capsule Commonly known as:  DEXILANT Take 1 Cap by mouth daily as needed. Indications: GASTROESOPHAGEAL REFLUX  
  
 fluticasone-salmeterol 100-50 mcg/dose diskus inhaler Commonly known as:  ADVAIR Take 1 Puff by inhalation every twelve (12) hours. HARVONI  mg Tab Generic drug:  ledipasvir-sofosbuvir Take 1 Tab by mouth daily. Indications: CHRONIC HEPATITIS C - GENOTYPE 1  
  
 magnesium oxide 400 mg tablet Commonly known as:  MAG-OX Take 1 Tab by mouth daily. meloxicam 15 mg tablet Commonly known as:  MOBIC Take 1 Tab by mouth daily. Indications: OSTEOARTHRITIS  
  
 mometasone 50 mcg/actuation nasal spray Commonly known as:  NASONEX  
2 Sprays by Both Nostrils route daily. olmesartan-hydroCHLOROthiazide 40-25 mg per tablet Commonly known as:  BENICAR HCT Take 1 Tab by mouth daily. Indications: hypertension  
  
 travoprost 0.004 % ophthalmic solution Commonly known as:  TRAVATAN Z Administer 1 Drop to both eyes every evening. Indications: OPEN ANGLE GLAUCOMA  
  
 verapamil 120 mg tablet Commonly known as:  CALAN  
TAKE ONE TABLET BY MOUTH TWICE A DAY  
  
 VITAMIN D2 50,000 unit capsule Generic drug:  ergocalciferol TAKE ONE CAPSULE BY MOUTH EVERY 7 DAYS Prescriptions Sent to Pharmacy Refills  
 olmesartan-hydroCHLOROthiazide (BENICAR HCT) 40-25 mg per tablet 6 Sig: Take 1 Tab by mouth daily. Indications: hypertension Class: Normal  
 [facility] Ph #: [phone] Route: Oral  
 verapamil (CALAN) 120 mg tablet 6 Sig: TAKE ONE TABLET BY MOUTH TWICE A DAY Class: Normal  
 Pharmacy: [facility] [address] [place] [address] Ph #: [phone]  
 meloxicam (MOBIC) 15 mg tablet 1 Sig: Take 1 Tab by mouth daily. Indications: OSTEOARTHRITIS Class: Normal  
 Pharmacy: [facility] [address] MCKENZIE BOTELLO [address] [place] [address]. Ph #: [phone] Route: Oral  
  
We Performed the Following REFERRAL TO ORTHOPEDICS [[id number] Custom] Comments:  
 Primary arthritis of the knees. Patient requesting steroid injections. Follow-up Instructions Return in about 6 months (around 10/24/2018), or if symptoms worsen or fail to improve. Referral Information Referral ID Referred By Referred To  
  
 [id number] [address], [address] [place], [address] Phone: [phone] Fax: [phone] Visits Status Start Date End Date 1 Authorized 4/24/18 4/24/19 If your referral has a status of pending review or denied, additional information will be sent to support the outcome of this decision. Introducing [facility]! Dear Momo Ellis: Thank you for requesting a [employer] account. Our records indicate that you already have an active [employer] account. You can access your account anytime at [email or web address] Did you know that you can access your hospital and ER discharge instructions at any time in [employer]? You can also review all of your test results from your hospital stay or ER visit. Additional Information If you have questions, please visit the Frequently Asked Questions section of the [employer] website at [email or web address] Remember, [employer] is NOT to be used for urgent needs. For medical emergencies, dial 911. Now available from your iPhone and Android! Please provide this summary of care documentation to your next provider. Your primary care clinician is listed as [facility]. If you have any questions after today's visit, please call [phone]. Hemigard Intro: Due to skin fragility and wound tension, it was decided to use HEMIGARD adhesive retention suture devices to permit a linear closure. The skin was cleaned and dried for a 6cm distance away from the wound. Excessive hair, if present, was removed to allow for adhesion.

## 2024-10-22 NOTE — TELEPHONE ENCOUNTER
Called patient and verified identity with 2 pt identifiers. Notified of Negative covid results. Pt requested work note stating she was negative. Informed patient to call if any questions or concerns.
Admission

## (undated) DEVICE — MEDI-VAC NON-CONDUCTIVE SUCTION TUBING: Brand: CARDINAL HEALTH

## (undated) DEVICE — SOLUTION IRRIG 1000ML H2O STRL BLT

## (undated) DEVICE — GOWN ISOL IMPERV UNIV, DISP, OPEN BACK, BLUE --

## (undated) DEVICE — FLEX ADVANTAGE 3000CC: Brand: FLEX ADVANTAGE

## (undated) DEVICE — SYRINGE MED 25GA 3ML L5/8IN SUBQ PLAS W/ DETACH NDL SFTY

## (undated) DEVICE — ENDOSCOPY PUMP TUBING/ CAP SET: Brand: ERBE

## (undated) DEVICE — GAUZE SPONGES,16 PLY: Brand: CURITY

## (undated) DEVICE — SYR 10ML LUER LOK 1/5ML GRAD --

## (undated) DEVICE — CANNULA ORIG TL CLR W FOAM CUSHIONS AND 14FT SUPL TB 3 CHN

## (undated) DEVICE — BASIN EMSIS 16OZ GRAPHITE PLAS KID SHP MOLD GRAD FOR ORAL

## (undated) DEVICE — SYR 50ML SLIP TIP NSAF LF STRL --

## (undated) DEVICE — FLUFF AND POLYMER UNDERPAD,EXTRA HEAVY: Brand: WINGS

## (undated) DEVICE — CANNULA NSL AD TBNG L14FT STD PVC O2 CRV CONN NONFLARED NSL

## (undated) DEVICE — MEDI-VAC SUCTION HIGH CAPACITY: Brand: CARDINAL HEALTH

## (undated) DEVICE — SYR 20ML LL STRL LF --

## (undated) DEVICE — SNARE ENDOSCP L240CM SHTH DIA2.4MM LOOP W20MM MIN WRK CHN

## (undated) DEVICE — (D)SYR 10ML 1/5ML GRAD NSAF -- PKGING CHANGE USE ITEM 338027

## (undated) DEVICE — SYRINGE MED 20ML STD CLR PLAS LUERLOCK TIP N CTRL DISP

## (undated) DEVICE — CATHETER THOR 36FR DIA10.7MM POLYVI CHL TRCR TIP STR SFT

## (undated) DEVICE — CATHETER SUCT TR FL TIP 14FR W/ O CTRL

## (undated) DEVICE — YANKAUER,SMOOTH HANDLE,HIGH CAPACITY: Brand: MEDLINE INDUSTRIES, INC.

## (undated) DEVICE — TRAP SPEC COLL POLYP POLYSTYR --

## (undated) DEVICE — STERILE POLYISOPRENE POWDER-FREE SURGICAL GLOVES: Brand: PROTEXIS

## (undated) DEVICE — LINER SUCT CANSTR 3000CC PLAS SFT PRE ASSEMB W/OUT TBNG W/

## (undated) DEVICE — FCPS RAD JAW 4LC 240CM W/NDL -- BX/20 RADIAL JAW 4

## (undated) DEVICE — GAUZE,SPONGE,4"X4",16PLY,STRL,LF,10/TRAY: Brand: MEDLINE

## (undated) DEVICE — (D)GLOVE EXAM LG NITRL NS -- DISC BY MFR NO SUB

## (undated) DEVICE — BITE BLOCK ENDOSCP UNIV AD 6 TO 9.4 MM

## (undated) DEVICE — BASIN EMESIS 500CC ROSE 250/CS 60/PLT: Brand: MEDEGEN MEDICAL PRODUCTS, LLC

## (undated) DEVICE — AIRLIFE™ NASAL OXYGEN CANNULA CURVED, NONFLARED TIP WITH 14 FOOT (4.3 M) CRUSH-RESISTANT TUBING, OVER-THE-EAR STYLE: Brand: AIRLIFE™

## (undated) DEVICE — AIRLIFE™ NASAL OXYGEN CANNULA CURVED, FLARED TIP WITH 14 FOOT (4.3 M) CRUSH-RESISTANT TUBING, OVER-THE-EAR STYLE: Brand: AIRLIFE™